# Patient Record
Sex: MALE | Race: WHITE | NOT HISPANIC OR LATINO | Employment: OTHER | URBAN - METROPOLITAN AREA
[De-identification: names, ages, dates, MRNs, and addresses within clinical notes are randomized per-mention and may not be internally consistent; named-entity substitution may affect disease eponyms.]

---

## 2017-01-06 ENCOUNTER — OFFICE VISIT (OUTPATIENT)
Dept: FAMILY MEDICINE | Facility: CLINIC | Age: 52
End: 2017-01-06
Payer: MEDICARE

## 2017-01-06 VITALS
HEART RATE: 66 BPM | HEIGHT: 69 IN | BODY MASS INDEX: 31.47 KG/M2 | RESPIRATION RATE: 18 BRPM | DIASTOLIC BLOOD PRESSURE: 80 MMHG | OXYGEN SATURATION: 97 % | WEIGHT: 212.5 LBS | SYSTOLIC BLOOD PRESSURE: 122 MMHG

## 2017-01-06 DIAGNOSIS — E11.42 TYPE 2 DIABETES MELLITUS WITH DIABETIC POLYNEUROPATHY, WITHOUT LONG-TERM CURRENT USE OF INSULIN: ICD-10-CM

## 2017-01-06 DIAGNOSIS — Z12.11 COLON CANCER SCREENING: ICD-10-CM

## 2017-01-06 DIAGNOSIS — M51.36 DEGENERATION OF LUMBAR INTERVERTEBRAL DISC: Primary | ICD-10-CM

## 2017-01-06 DIAGNOSIS — F11.20 OPIOID DEPENDENCE, UNCOMPLICATED: ICD-10-CM

## 2017-01-06 DIAGNOSIS — F31.9 BIPOLAR I DISORDER, MOST RECENT EPISODE (OR CURRENT) UNSPECIFIED: ICD-10-CM

## 2017-01-06 DIAGNOSIS — Z29.9 PREVENTIVE MEASURE: ICD-10-CM

## 2017-01-06 PROCEDURE — 3079F DIAST BP 80-89 MM HG: CPT | Mod: S$GLB,,,

## 2017-01-06 PROCEDURE — 99499 UNLISTED E&M SERVICE: CPT | Mod: S$GLB,,,

## 2017-01-06 PROCEDURE — 99214 OFFICE O/P EST MOD 30 MIN: CPT | Mod: S$GLB,,,

## 2017-01-06 PROCEDURE — 1159F MED LIST DOCD IN RCRD: CPT | Mod: S$GLB,,,

## 2017-01-06 PROCEDURE — 99999 PR PBB SHADOW E&M-EST. PATIENT-LVL V: CPT | Mod: PBBFAC,,,

## 2017-01-06 PROCEDURE — 3074F SYST BP LT 130 MM HG: CPT | Mod: S$GLB,,,

## 2017-01-06 PROCEDURE — 2022F DILAT RTA XM EVC RTNOPTHY: CPT | Mod: S$GLB,,,

## 2017-01-06 PROCEDURE — 4010F ACE/ARB THERAPY RXD/TAKEN: CPT | Mod: S$GLB,,,

## 2017-01-06 PROCEDURE — 3044F HG A1C LEVEL LT 7.0%: CPT | Mod: S$GLB,,,

## 2017-01-06 RX ORDER — DIVALPROEX SODIUM 500 MG/1
TABLET, DELAYED RELEASE ORAL
COMMUNITY
Start: 2017-01-02 | End: 2017-03-26 | Stop reason: SDUPTHER

## 2017-01-06 RX ORDER — HYDROCODONE BITARTRATE AND ACETAMINOPHEN 10; 325 MG/1; MG/1
1 TABLET ORAL EVERY 6 HOURS PRN
Qty: 120 TABLET | Refills: 0 | Status: SHIPPED | OUTPATIENT
Start: 2017-01-06 | End: 2017-02-05

## 2017-01-06 RX ORDER — HYDROCODONE BITARTRATE AND ACETAMINOPHEN 10; 325 MG/1; MG/1
1 TABLET ORAL EVERY 6 HOURS PRN
Qty: 120 TABLET | Refills: 0 | Status: SHIPPED | OUTPATIENT
Start: 2017-03-06 | End: 2017-03-10

## 2017-01-06 RX ORDER — HYDROCODONE BITARTRATE AND ACETAMINOPHEN 10; 325 MG/1; MG/1
1 TABLET ORAL EVERY 6 HOURS PRN
Qty: 120 TABLET | Refills: 0 | Status: SHIPPED | OUTPATIENT
Start: 2017-02-06 | End: 2017-03-08

## 2017-01-06 NOTE — PROGRESS NOTES
Subjective:       Patient ID: Mike Barcenas Jr. is a 51 y.o. male.    Chief Complaint: 3 month check up    HPI The patient presents for medical management of chronic pain. He has chronic pain in his back. He has been treated with opioid pain medications for years because he failed to improve with more conservative treatments such as OTC medications, Non-opioid pain medications, therapy and other modalities. He is compliant with treatment. I reviewed his profile on the [facility] Prescription Monitoring Program Website and he appears to be compliant. His last refill of hydrocodone-APAP  # 120 was on December 12, 2016. He has severe daily pain. Pain medications allow him to perform activities of daily living that he would otherwise not be able to perform. He denies any ongoing substance abuse. He has signed a pain management agreement and understands the risk, benefits and alternatives of treatment with chronic opioid pain medications including the risk of tolerance and dependency.   According to the recommendations from the [employer] for Disease control it is no longer considered to be safe to take benzodiazepines with opioid pain medications. He is weaning diazepam.     Review of Systems   Constitutional: Negative.    Respiratory: Negative.  Negative for shortness of breath.    Cardiovascular: Negative for chest pain.   Psychiatric/Behavioral: Negative.    All other systems reviewed and are negative.      Objective:      Vitals:    01/06/17 1302   BP: 122/80   Pulse: 66   Resp: 18     Physical Exam   Constitutional: He is oriented to person, place, and time. He appears well-developed and well-nourished. He is cooperative. No distress.   HENT:   Head: Normocephalic and atraumatic.   Right Ear: Hearing, tympanic membrane, external ear and ear canal normal.   Left Ear: Hearing, external ear and ear canal normal.   Nose: Nose normal.   Mouth/Throat: Oropharynx is clear and moist.   Eyes: Conjunctivae are  normal. Pupils are equal, round, and reactive to light.   Neck: Normal range of motion. Neck supple. No thyromegaly present.   Cardiovascular: Normal rate, regular rhythm, normal heart sounds and intact distal pulses.    Pulmonary/Chest: Effort normal and breath sounds normal. No respiratory distress.   Musculoskeletal: Normal range of motion. He exhibits no edema or tenderness.   Lymphadenopathy:     He has no cervical adenopathy.   Neurological: He is alert and oriented to person, place, and time. He has normal strength. Coordination and gait normal.   Skin: Skin is warm, dry and intact. No cyanosis. Nails show no clubbing.   Psychiatric: He has a normal mood and affect. His speech is normal and behavior is normal. Judgment and thought content normal. Cognition and memory are normal.   Vitals reviewed.      Assessment:       1. Degeneration of lumbar intervertebral disc    2. Type 2 diabetes mellitus with diabetic polyneuropathy, without long-term current use of insulin    3. Opioid dependence, uncomplicated    4. Bipolar I disorder, most recent episode (or current) unspecified    5. Colon cancer screening    6. Preventive measure        Plan:       Degeneration of lumbar intervertebral disc  -     hydrocodone-acetaminophen 10-325mg (NORCO)  mg Tab; Take 1 tablet by mouth every 6 (six) hours as needed.  Dispense: 120 tablet; Refill: 0  -     hydrocodone-acetaminophen 10-325mg (NORCO)  mg Tab; Take 1 tablet by mouth every 6 (six) hours as needed.  Dispense: 120 tablet; Refill: 0  -     hydrocodone-acetaminophen 10-325mg (NORCO)  mg Tab; Take 1 tablet by mouth every 6 (six) hours as needed.  Dispense: 120 tablet; Refill: 0    Type 2 diabetes mellitus with diabetic polyneuropathy, without long-term current use of insulin    Opioid dependence, uncomplicated    Bipolar I disorder, most recent episode (or current) unspecified    Colon cancer screening  -     Case request GI: COLONOSCOPY    Preventive  measure  -     Pneumococcal Polysaccharide Vaccine (23 Valent) (SQ/IM)      Return in about 3 months (around 4/6/2017).

## 2017-01-06 NOTE — MR AVS SNAPSHOT
[facility]  [address] [place] [address]  Phone: [phone]  Fax: [phone]                  Mike HETAL Barcenas Jr.   2017 1:00 PM   Office Visit    Description:  Male : 1965   Provider:  Kiet Parker Jr., MD   Department:  [facility]           Reason for Visit     3 month check up           Diagnoses this Visit        Comments    Degeneration of lumbar intervertebral disc    -  Primary     Type 2 diabetes mellitus with diabetic polyneuropathy, without long-term current use of insulin         Opioid dependence, uncomplicated         Bipolar I disorder, most recent episode (or current) unspecified         Colon cancer screening         Preventive measure                To Do List           Goals (5 Years of Data)     None       These Medications        Disp Refills Start End    hydrocodone-acetaminophen 10-325mg (NORCO)  mg Tab 120 tablet 0 2017    Take 1 tablet by mouth every 6 (six) hours as needed. - Oral    Pharmacy: [facility] - [place] - [id number] Ph #: [phone]       hydrocodone-acetaminophen 10-325mg (NORCO)  mg Tab 120 tablet 0 2017 3/8/2017    Take 1 tablet by mouth every 6 (six) hours as needed. - Oral    Pharmacy: [facility] - [place] - [id number] Ph #: [phone]       hydrocodone-acetaminophen 10-325mg (NORCO)  mg Tab 120 tablet 0 3/6/2017 2017    Take 1 tablet by mouth every 6 (six) hours as needed. - Oral    Pharmacy: [facility] - [place] - [address] Ph #: [phone]         [facility]     [facility] On [facility] Nurse Care Line -  Assistance  Registered nurses in the [facility] provide clinical advisement, health education, appointment booking, and other advisory services.  Call for this free service at [phone].             Medications           Message regarding Medications     Verify the changes and/or  additions to your medication regime listed below are the same as discussed with your clinician today.  If any of these changes or additions are incorrect, please notify your healthcare provider.        START taking these NEW medications        Refills    hydrocodone-acetaminophen 10-325mg (NORCO)  mg Tab 0    Sig: Take 1 tablet by mouth every 6 (six) hours as needed.    Class: Print    Route: Oral    hydrocodone-acetaminophen 10-325mg (NORCO)  mg Tab 0    Starting on: 2/6/2017    Sig: Take 1 tablet by mouth every 6 (six) hours as needed.    Class: Print    Route: Oral    hydrocodone-acetaminophen 10-325mg (NORCO)  mg Tab 0    Starting on: 3/6/2017    Sig: Take 1 tablet by mouth every 6 (six) hours as needed.    Class: Print    Route: Oral           Verify that the below list of medications is an accurate representation of the medications you are currently taking.  If none reported, the list may be blank. If incorrect, please contact your healthcare provider. Carry this list with you in case of emergency.           Current Medications     amlodipine (NORVASC) 10 MG tablet Take 1 tablet (10 mg total) by mouth once daily.    ascorbic acid (VITAMIN C) 500 MG tablet Take 500 mg by mouth once daily.    atorvastatin (LIPITOR) 40 MG tablet Take 1 tablet (40 mg total) by mouth once daily.    calcium carbonate-vitamin D3 500mg (1,250mg) -600 unit Tab     diazePAM (VALIUM) 5 MG tablet Take 1 tablet (5 mg total) by mouth 3 (three) times daily as needed.    divalproex (DEPAKOTE) 500 MG Tb24 2 tablets by mouth every morning and 3 tablets by mouth at bedtime    divalproex (DEPAKOTE) 500 MG TbEC     fenofibrate 160 MG Tab Take 1 tablet (160 mg total) by mouth once daily.    fluoxetine (PROZAC) 20 MG capsule Take 1 capsule (20 mg total) by mouth every morning.    folic acid (FOLVITE) 800 MCG Tab     isosorbide mononitrate (IMDUR) 30 MG 24 hr tablet Take 2 tablets (60 mg total) by mouth once daily.    lisinopril 10  "MG tablet TAKE 1/2 TABLET BY MOUTH DAILY FOR BLOOD PRESSURE.    metformin (GLUCOPHAGE) 850 MG tablet Take 1 tablet (850 mg total) by mouth 2 (two) times daily with meals.    methocarbamol (ROBAXIN) 750 MG Tab Take 1 tablet (750 mg total) by mouth once daily.    multivitamin (THERAGRAN) per tablet Take 1 tablet by mouth once daily.    multivitamin with minerals tablet     quetiapine (SEROQUEL) 400 MG tablet Take 1 tablet (400 mg total) by mouth every evening.    VITAMIN B-12 50 mcg Lozg     vitamin E 400 UNIT capsule     hydrocodone-acetaminophen 10-325mg (NORCO)  mg Tab Take 1 tablet by mouth every 6 (six) hours as needed.    hydrocodone-acetaminophen 10-325mg (NORCO)  mg Tab Starting on Feb 06, 2017. Take 1 tablet by mouth every 6 (six) hours as needed.    hydrocodone-acetaminophen 10-325mg (NORCO)  mg Tab Starting on Mar 06, 2017. Take 1 tablet by mouth every 6 (six) hours as needed.           Clinical Reference Information           Vital Signs - Last Recorded  Most recent update: 1/6/2017  1:04 PM by Star Sarah LPN    BP Pulse Resp Ht Wt SpO2    122/80 (BP Location: Right arm, Patient Position: Sitting, BP Method: Manual) 66 18 5' 9" (1.753 m) 96.4 kg (212 lb 8.4 oz) 97%    BMI                31.38 kg/m2          Blood Pressure          Most Recent Value    BP  122/80      Allergies as of 1/6/2017     Ciprofloxacin      Immunizations Administered on Date of Encounter - 1/6/2017     Name Date Dose VIS Date Route    Pneumococcal Polysaccharide - 23 Valent  Incomplete 0.5 mL 4/24/2015 Intramuscular      Orders Placed During Today's Visit      Normal Orders This Visit    Case request GI: COLONOSCOPY     Pneumococcal Polysaccharide Vaccine (23 Valent) (SQ/IM)       Instructions    You are being prescribed opioid pain medications for chronic pain.     Opioid pain medications can cause drug dependence. This means that withdrawal symptoms may occur if you stop using the medicine too quickly. " "    Opioid pain medications can only be prescribed in compliance with chronic opioid pain medication regulations of the [facility] Board of Medical Examiners, the [facility] board and the Federal Drug Enforcement Agency (VIK[employer]).     It is important that you are compliant in taking Opioid pain medications strictly as prescribed. You have signed an agreement and are aware of the risk of physical dependency, tolerance or addiction to Opioid pain medications. You have been informed of the side effects associated with this medication including constipation. You are aware that withdrawal symptoms including yawning, sweating, watery eyes, runny nose, anxiety, tremors, aching muscles, hot and cold flashes, goose bumps, abdominal pain, diarrhea and depression can occur if you suddenly discontinue or reduce the dose you are prescribed.     You have been informed that this medication should not be taken during pregnancy and you will inform me if you do plan to become or become pregnant during treatment.     You have agreed not to obtain this or other controlled medications from other physicians while taking Opioid pain medications. You understand that I will review a history of all controlled medications prescribed to you on the [facility] Prescription Monitoring Program Database and that you must report any other controlled medication use to your physician. If you have an emergency or acute pain crisis you will inform me as soon as possible.     You will come for your regularly scheduled appointments which must be at least every 90 days. You may schedule an appointment early and I can prescribe your medication in advance with a "do not fill until" date. If you have not been evaluated in the past 90 days your prescription will not be renewed. You should schedule an appointment for your return visit before you leave the office today because if I am out of the office for vacation or other reasons there " is no other physician that can prescribe this medication in my absence. If you schedule an appointment and I need to cancel that visit my office will contact you to make alternative arrangements.     You should not call for refills of this medication at night, weekends or Holidays without exception.     You have agreed not to use illegal drugs while taking Opioid pain medications including marijuana. You understand that I can request a random drug test at any time and that you have agreed to provide this sample without advanced notice. Failure to comply with this request could result in termination of your treatment with Opioid pain medications.     You understand that lost, stolen, misplaced, spilled medications will not be replaced. It is your responsibility to safeguard your medication. You understand that your medication cannot be filled early for any reason including work schedule conflicts, family emergencies, travel and vacations. You also have agreed not to give or sell Opioid pain medications to anyone.            Smoking Cessation     If you would like to quit smoking:   You may be eligible for free services if you are a [place] resident and started smoking cigarettes before September 1, 1988.  Call the Smoking Cessation Trust (SCT) toll free at (666) 105-4038 or (476) [phone].   Call 3-048-QUIT-NOW if you do not meet the above criteria.

## 2017-01-06 NOTE — PATIENT INSTRUCTIONS
"You are being prescribed opioid pain medications for chronic pain.     Opioid pain medications can cause drug dependence. This means that withdrawal symptoms may occur if you stop using the medicine too quickly.     Opioid pain medications can only be prescribed in compliance with chronic opioid pain medication regulations of the [facility] Board of Medical Examiners, the [facility] board and the Federal Drug Enforcement Agency (VIK[employer]).     It is important that you are compliant in taking Opioid pain medications strictly as prescribed. You have signed an agreement and are aware of the risk of physical dependency, tolerance or addiction to Opioid pain medications. You have been informed of the side effects associated with this medication including constipation. You are aware that withdrawal symptoms including yawning, sweating, watery eyes, runny nose, anxiety, tremors, aching muscles, hot and cold flashes, goose bumps, abdominal pain, diarrhea and depression can occur if you suddenly discontinue or reduce the dose you are prescribed.     You have been informed that this medication should not be taken during pregnancy and you will inform me if you do plan to become or become pregnant during treatment.     You have agreed not to obtain this or other controlled medications from other physicians while taking Opioid pain medications. You understand that I will review a history of all controlled medications prescribed to you on the [facility] Prescription Monitoring Program Database and that you must report any other controlled medication use to your physician. If you have an emergency or acute pain crisis you will inform me as soon as possible.     You will come for your regularly scheduled appointments which must be at least every 90 days. You may schedule an appointment early and I can prescribe your medication in advance with a "do not fill until" date. If you have not been evaluated in the " past 90 days your prescription will not be renewed. You should schedule an appointment for your return visit before you leave the office today because if I am out of the office for vacation or other reasons there is no other physician that can prescribe this medication in my absence. If you schedule an appointment and I need to cancel that visit my office will contact you to make alternative arrangements.     You should not call for refills of this medication at night, weekends or Holidays without exception.     You have agreed not to use illegal drugs while taking Opioid pain medications including marijuana. You understand that I can request a random drug test at any time and that you have agreed to provide this sample without advanced notice. Failure to comply with this request could result in termination of your treatment with Opioid pain medications.     You understand that lost, stolen, misplaced, spilled medications will not be replaced. It is your responsibility to safeguard your medication. You understand that your medication cannot be filled early for any reason including work schedule conflicts, family emergencies, travel and vacations. You also have agreed not to give or sell Opioid pain medications to anyone.

## 2017-01-13 ENCOUNTER — OFFICE VISIT (OUTPATIENT)
Dept: PSYCHIATRY | Facility: CLINIC | Age: 52
End: 2017-01-13
Payer: MEDICARE

## 2017-01-13 DIAGNOSIS — F31.31 BIPOLAR 1 DISORDER, DEPRESSED, MILD: Primary | ICD-10-CM

## 2017-01-13 PROCEDURE — 90834 PSYTX W PT 45 MINUTES: CPT | Mod: S$GLB,,, | Performed by: SOCIAL WORKER

## 2017-01-13 NOTE — PROGRESS NOTES
Individual Psychotherapy (PhD/LCSW)    1/13/2017    Site:  [facility]         Therapeutic Intervention: Met with patient.  Outpatient  - Insight oriented psychotherapy 45-50  min - [id number]    Chief complaint/reason for encounter: bipolar     Interval history and content of current session:   Salient event he lost his cat of 19 years two days ago.  He was properly tearful as he discussed the events.  He is doing well.  Has multiple projects.  Barely drinks alcohol.  He likes it living at father's house.       Treatment plan:  · Target symptoms: mood stability  · Why chosen therapy is appropriate versus another modality: relevant to diagnosis  · Outcome monitoring methods: self-report, observation  · Therapeutic intervention type: behavior modifying psychotherapy, supportive psychotherapy    Risk parameters:  Patient reports no suicidal ideation  Patient reports no homicidal ideation  Patient reports no self-injurious behavior  Patient reports no violent behavior    Verbal deficits: None    Patient's response to intervention:  The patient's response to intervention is accepting.    Progress toward goals and other mental status changes:  The patient's progress toward goals is fair .    Diagnosis: bipolar disorder    Plan:  individual psychotherapy    Return to clinic: 3 months.  Pt request 3 mo.

## 2017-01-31 RX ORDER — DIAZEPAM 5 MG/1
5 TABLET ORAL 3 TIMES DAILY PRN
Qty: 90 TABLET | Refills: 3 | Status: SHIPPED | OUTPATIENT
Start: 2017-01-31 | End: 2017-05-01 | Stop reason: SDUPTHER

## 2017-02-03 DIAGNOSIS — E11.9 TYPE 2 DIABETES MELLITUS WITHOUT COMPLICATION: ICD-10-CM

## 2017-02-07 ENCOUNTER — TELEPHONE (OUTPATIENT)
Dept: SURGERY | Facility: HOSPITAL | Age: 52
End: 2017-02-07

## 2017-02-07 NOTE — TELEPHONE ENCOUNTER
Mr Barcenas would like Ling to give him a call he has some questions about procedure Thanks Krysta

## 2017-02-20 ENCOUNTER — ANESTHESIA EVENT (OUTPATIENT)
Dept: ENDOSCOPY | Facility: HOSPITAL | Age: 52
End: 2017-02-20
Payer: MEDICARE

## 2017-02-21 ENCOUNTER — HOSPITAL ENCOUNTER (OUTPATIENT)
Facility: HOSPITAL | Age: 52
Discharge: HOME | End: 2017-02-21
Attending: INTERNAL MEDICINE | Admitting: INTERNAL MEDICINE
Payer: MEDICARE

## 2017-02-21 ENCOUNTER — SURGERY (OUTPATIENT)
Age: 52
End: 2017-02-21

## 2017-02-21 ENCOUNTER — ANESTHESIA (OUTPATIENT)
Dept: ENDOSCOPY | Facility: HOSPITAL | Age: 52
End: 2017-02-21
Payer: MEDICARE

## 2017-02-21 VITALS
HEIGHT: 69 IN | WEIGHT: 198 LBS | HEART RATE: 62 BPM | BODY MASS INDEX: 29.33 KG/M2 | DIASTOLIC BLOOD PRESSURE: 78 MMHG | TEMPERATURE: 98 F | OXYGEN SATURATION: 98 % | SYSTOLIC BLOOD PRESSURE: 120 MMHG | RESPIRATION RATE: 18 BRPM

## 2017-02-21 VITALS — RESPIRATION RATE: 22 BRPM

## 2017-02-21 DIAGNOSIS — Z12.11 COLON CANCER SCREENING: ICD-10-CM

## 2017-02-21 LAB — GLUCOSE SERPL-MCNC: 118 MG/DL (ref 70–110)

## 2017-02-21 PROCEDURE — 25000003 PHARM REV CODE 250: Mod: PO | Performed by: NURSE ANESTHETIST, CERTIFIED REGISTERED

## 2017-02-21 PROCEDURE — G0121 COLON CA SCRN NOT HI RSK IND: HCPCS | Mod: PO | Performed by: INTERNAL MEDICINE

## 2017-02-21 PROCEDURE — D9220A PRA ANESTHESIA: Mod: 33,ANES,, | Performed by: ANESTHESIOLOGY

## 2017-02-21 PROCEDURE — 82962 GLUCOSE BLOOD TEST: CPT | Mod: PO | Performed by: INTERNAL MEDICINE

## 2017-02-21 PROCEDURE — 37000009 HC ANESTHESIA EA ADD 15 MINS: Mod: PO | Performed by: INTERNAL MEDICINE

## 2017-02-21 PROCEDURE — 25000003 PHARM REV CODE 250: Mod: PO | Performed by: INTERNAL MEDICINE

## 2017-02-21 PROCEDURE — G0105 COLORECTAL SCRN; HI RISK IND: HCPCS | Mod: ,,, | Performed by: INTERNAL MEDICINE

## 2017-02-21 PROCEDURE — D9220A PRA ANESTHESIA: Mod: 33,CRNA,, | Performed by: NURSE ANESTHETIST, CERTIFIED REGISTERED

## 2017-02-21 PROCEDURE — 37000008 HC ANESTHESIA 1ST 15 MINUTES: Mod: PO | Performed by: INTERNAL MEDICINE

## 2017-02-21 PROCEDURE — 63600175 PHARM REV CODE 636 W HCPCS: Mod: PO | Performed by: NURSE ANESTHETIST, CERTIFIED REGISTERED

## 2017-02-21 RX ORDER — LIDOCAINE HCL/PF 100 MG/5ML
SYRINGE (ML) INTRAVENOUS
Status: DISCONTINUED | OUTPATIENT
Start: 2017-02-21 | End: 2017-02-21

## 2017-02-21 RX ORDER — SODIUM CHLORIDE, SODIUM LACTATE, POTASSIUM CHLORIDE, CALCIUM CHLORIDE 600; 310; 30; 20 MG/100ML; MG/100ML; MG/100ML; MG/100ML
INJECTION, SOLUTION INTRAVENOUS CONTINUOUS
Status: DISCONTINUED | OUTPATIENT
Start: 2017-02-21 | End: 2017-02-21 | Stop reason: HOSPADM

## 2017-02-21 RX ORDER — PROPOFOL 10 MG/ML
VIAL (ML) INTRAVENOUS
Status: DISCONTINUED | OUTPATIENT
Start: 2017-02-21 | End: 2017-02-21

## 2017-02-21 RX ADMIN — PROPOFOL 30 MG: 10 INJECTION, EMULSION INTRAVENOUS at 10:02

## 2017-02-21 RX ADMIN — LIDOCAINE HYDROCHLORIDE 100 MG: 20 INJECTION, SOLUTION INTRAVENOUS at 10:02

## 2017-02-21 RX ADMIN — SODIUM CHLORIDE, SODIUM LACTATE, POTASSIUM CHLORIDE, AND CALCIUM CHLORIDE: .6; .31; .03; .02 INJECTION, SOLUTION INTRAVENOUS at 10:02

## 2017-02-21 NOTE — ANESTHESIA POSTPROCEDURE EVALUATION
"Anesthesia Post Evaluation    Patient: Mike Barcenas Jr.    Procedure(s) Performed: Procedure(s) (LRB):  COLONOSCOPY (N/A)    Final Anesthesia Type: general  Patient location during evaluation: PACU  Patient participation: Yes- Able to Participate  Level of consciousness: awake and alert and oriented  Post-procedure vital signs: reviewed and stable  Pain management: adequate  Airway patency: patent  PONV status at discharge: No PONV  Anesthetic complications: no      Cardiovascular status: hemodynamically stable  Respiratory status: unassisted, spontaneous ventilation and room air  Hydration status: euvolemic  Follow-up not needed.        Visit Vitals    BP (!) 99/55 (BP Location: Left arm, Patient Position: Lying, BP Method: Automatic)    Pulse 64    Temp 36.6 °C (97.8 °F) (Skin)    Resp 18    Ht 5' 9" (1.753 m)    Wt 89.8 kg (198 lb)    SpO2 98%    BMI 29.24 kg/m2       Pain/Jose L Score: Pain Assessment Performed: Yes (2/21/2017 10:59 AM)  Presence of Pain: non-verbal indicators absent (2/21/2017 10:59 AM)  Jose L Score: 6 (2/21/2017 10:59 AM)      "

## 2017-02-21 NOTE — BRIEF OP NOTE
Discharge Note  Short Stay      SUMMARY     Admit Date: 2/21/2017    Attending Physician: All Blankenship Jr., MD     Discharge Physician: All Blankenship Jr., MD    Discharge Date: 2/21/2017 11:13 AM    Final Diagnosis: Colon cancer screening [Z12.11]  Impression:          - Internal hemorrhoids.                       - Mild colonic spasm consistent with irritable bowel                        syndrome.                       - The examination was otherwise normal.                       - The examined portion of the ileum was normal.                       - No specimens collected.  Recommendation:      - Discharge patient to home.                       - High fiber diet.                       - Repeat colonoscopy in 8 years for screening                        purposes.                       - Continue present medications.  Disposition: HOME OR SELF CARE    Patient Instructions:   Current Discharge Medication List      CONTINUE these medications which have NOT CHANGED    Details   amlodipine (NORVASC) 10 MG tablet Take 1 tablet (10 mg total) by mouth once daily.  Qty: 90 tablet, Refills: 3    Associated Diagnoses: Essential hypertension      ascorbic acid (VITAMIN C) 500 MG tablet Take 500 mg by mouth once daily.      atorvastatin (LIPITOR) 40 MG tablet Take 1 tablet (40 mg total) by mouth once daily.  Qty: 90 tablet, Refills: 3      calcium carbonate-vitamin D3 500mg (1,250mg) -600 unit Tab       diazePAM (VALIUM) 5 MG tablet Take 1 tablet (5 mg total) by mouth 3 (three) times daily as needed.  Qty: 90 tablet, Refills: 3      divalproex (DEPAKOTE) 500 MG Tb24 2 tablets by mouth every morning and 3 tablets by mouth at bedtime  Qty: 450 tablet, Refills: 0    Associated Diagnoses: Moderate depressed bipolar I disorder; Anxiety state      fenofibrate 160 MG Tab Take 1 tablet (160 mg total) by mouth once daily.  Qty: 90 tablet, Refills: 3      fluoxetine (PROZAC) 20 MG capsule Take 1 capsule (20 mg total) by mouth  every morning.  Qty: 90 capsule, Refills: 0      folic acid (FOLVITE) 800 MCG Tab       !! hydrocodone-acetaminophen 10-325mg (NORCO)  mg Tab Take 1 tablet by mouth every 6 (six) hours as needed.  Qty: 120 tablet, Refills: 0    Associated Diagnoses: Degeneration of lumbar intervertebral disc      isosorbide mononitrate (IMDUR) 30 MG 24 hr tablet Take 2 tablets (60 mg total) by mouth once daily.  Qty: 180 tablet, Refills: 3      lisinopril 10 MG tablet TAKE 1/2 TABLET BY MOUTH DAILY FOR BLOOD PRESSURE.  Qty: 45 tablet, Refills: 3      methocarbamol (ROBAXIN) 750 MG Tab Take 1 tablet (750 mg total) by mouth once daily.  Qty: 90 tablet, Refills: 3      multivitamin (THERAGRAN) per tablet Take 1 tablet by mouth once daily.      VITAMIN B-12 50 mcg Lozg       divalproex (DEPAKOTE) 500 MG TbEC       !! hydrocodone-acetaminophen 10-325mg (NORCO)  mg Tab Take 1 tablet by mouth every 6 (six) hours as needed.  Qty: 120 tablet, Refills: 0    Associated Diagnoses: Degeneration of lumbar intervertebral disc      metformin (GLUCOPHAGE) 850 MG tablet Take 1 tablet (850 mg total) by mouth 2 (two) times daily with meals.  Qty: 180 tablet, Refills: 3      multivitamin with minerals tablet       quetiapine (SEROQUEL) 400 MG tablet Take 1 tablet (400 mg total) by mouth every evening.  Qty: 90 tablet, Refills: 0      vitamin E 400 UNIT capsule        !! - Potential duplicate medications found. Please discuss with provider.          Discharge Procedure Orders (must include Diet, Follow-up, Activity)    Follow Up:  Follow up with PCP as per your routine.  Please follow a high fiber diet.  Activity as tolerated.    No driving day of procedure.

## 2017-02-21 NOTE — DISCHARGE INSTRUCTIONS
Procedural Sedation (Adult)  You have been given medicine by vein to make you sleep during your surgery. This may have included both a pain medicine and sleeping medicine. Most of the effects have worn off. But you may still have some drowsiness for the next 6 to 8 hours.  Home care  Follow these guidelines when you get home:  · For the next 8 hours, you should be watched by a responsible adult. This person should make sure your condition is not getting worse.  · Don't take any medicine by mouth for pain or for sleep during the next 4 hours. These might react with the medicines you were given in the hospital. This could cause a much stronger response than usual.  · Don't drink any alcohol for the next 24 hours.  · Don't drive, operate dangerous machinery, or make important business or personal decisions during the next 24 hours.  Follow-up care  Follow up with your healthcare provider if you are not alert and back to your usual level of activity within 12 hours.  When to seek medical advice  Call your healthcare provider right away if any of these occur:  · Drowsiness gets worse  · Weakness or dizziness gets worse  · Repeated vomiting  · You cannot be awakened   Date Last Reviewed: 10/18/2016  © 9342-9643 [employer]. [address] [place] [address]. All rights reserved. This information is not intended as a substitute for professional medical care. Always follow your healthcare professional's instructions.        High-Fiber Diet  Fiber is in fruits, vegetables, cereals, and grains. Fiber passes through your body undigested. A high-fiber diet helps food move through your intestinal tract. The added bulk is helpful in preventing constipation. In people with diverticulosis, fiber helps clean out the pouches along the colon wall. It also prevents new pouches from forming. A high-fiber diet reduces the risk of colon cancer. It also lowers blood cholesterol and prevents high blood sugar in  people with diabetes.    The fiber-rich foods listed below should be part of your diet. If you are not used to high-fiber foods, start with 1 or 2 foods from this list. Every 3 to 4 days add a new one to your diet. Do this until you are eating 4 high-fiber foods per day. This should give you 20 to 35 grams of fiber a day. It is also important to drink a lot of water when you are on this diet. You should have 6 to 8 glasses of water a day. Water makes the fiber swell and increases the benefit.  Foods high in dietary fiber  The following foods are high in dietary fiber:  · Breads. Breads made with 100% whole-wheat flour; carolyn, wheat, or rye crackers; whole-grain tortillas, bran muffins.  · Cereals. Whole-grain and bran cereals with bran (shredded wheat, wheat flakes, raisin bran, corn bran); oatmeal, rolled oats, granola, and brown rice.  · Fruits. Fresh fruits and their edible skins (pears, prunes, raisins, berries, apples, and apricots); bananas, citrus fruit, mangoes, pineapple; and prune juice.  · Nuts. Any nuts and seeds.  · Vegetables. Best served raw or lightly cooked. All types, especially: green peas, celery, eggplant, potatoes, spinach, broccoli, Russell sprouts, winter squash, carrots, cauliflower, soybeans, lentils, and fresh and dried beans of all kinds.  · Other. Popcorn, any spices.  Date Last Reviewed: 8/1/2016  © 9439-9863 [employer]. [address] [place] [address], [place] [address]. All rights reserved. This information is not intended as a substitute for professional medical care. Always follow your healthcare professional's instructions.

## 2017-02-21 NOTE — H&P
History & Physical - Short Stay  Gastroenterology      SUBJECTIVE:     Procedure: Colonoscopy    Chief Complaint/Indication for Procedure: Screening    History of Present Illness:  Asymptomatic  Office Visit     1/6/2017  [facility]       Kiet Parker Jr., MD   Family Medicine    Degeneration of lumbar intervertebral disc +5 more   Dx    3 month check up   Reason for visit        Progress Notes   Kiet Parker Jr., MD (Physician)   Family Medicine      Subjective:        Patient ID: Mike Barcenas Jr. is a 51 y.o. male.     Chief Complaint: 3 month check up     HPI The patient presents for medical management of chronic pain. He has chronic pain in his back. He has been treated with opioid pain medications for years because he failed to improve with more conservative treatments such as OTC medications, Non-opioid pain medications, therapy and other modalities. He is compliant with treatment. I reviewed his profile on the [facility] Prescription Monitoring Program Website and he appears to be compliant. His last refill of hydrocodone-APAP  # 120 was on December 12, 2016. He has severe daily pain. Pain medications allow him to perform activities of daily living that he would otherwise not be able to perform. He denies any ongoing substance abuse. He has signed a pain management agreement and understands the risk, benefits and alternatives of treatment with chronic opioid pain medications including the risk of tolerance and dependency.   According to the recommendations from the Center for Disease control it is no longer considered to be safe to take benzodiazepines with opioid pain medications. He is weaning diazepam.      Assessment:       1. Degeneration of lumbar intervertebral disc    2. Type 2 diabetes mellitus with diabetic polyneuropathy, without long-term current use of insulin    3. Opioid dependence, uncomplicated    4. Bipolar I disorder, most recent episode (or  current) unspecified    5. Colon cancer screening    6. Preventive measure        Plan:       Degeneration of lumbar intervertebral disc  - hydrocodone-acetaminophen 10-325mg (NORCO)  mg Tab; Take 1 tablet by mouth every 6 (six) hours as needed. Dispense: 120 tablet; Refill: 0  - hydrocodone-acetaminophen 10-325mg (NORCO)  mg Tab; Take 1 tablet by mouth every 6 (six) hours as needed. Dispense: 120 tablet; Refill: 0  - hydrocodone-acetaminophen 10-325mg (NORCO)  mg Tab; Take 1 tablet by mouth every 6 (six) hours as needed. Dispense: 120 tablet; Refill: 0     Type 2 diabetes mellitus with diabetic polyneuropathy, without long-term current use of insulin     Opioid dependence, uncomplicated     Bipolar I disorder, most recent episode (or current) unspecified     Colon cancer screening  - Case request GI: COLONOSCOPY     Preventive measure  - Pneumococcal Polysaccharide Vaccine (23 Valent) (SQ/IM)        Return in about 3 months (around 4/6/2017).                PTA Medications   Medication Sig    amlodipine (NORVASC) 10 MG tablet Take 1 tablet (10 mg total) by mouth once daily.    ascorbic acid (VITAMIN C) 500 MG tablet Take 500 mg by mouth once daily.    atorvastatin (LIPITOR) 40 MG tablet Take 1 tablet (40 mg total) by mouth once daily.    calcium carbonate-vitamin D3 500mg (1,250mg) -600 unit Tab     diazePAM (VALIUM) 5 MG tablet Take 1 tablet (5 mg total) by mouth 3 (three) times daily as needed.    divalproex (DEPAKOTE) 500 MG Tb24 2 tablets by mouth every morning and 3 tablets by mouth at bedtime    fenofibrate 160 MG Tab Take 1 tablet (160 mg total) by mouth once daily.    fluoxetine (PROZAC) 20 MG capsule Take 1 capsule (20 mg total) by mouth every morning.    folic acid (FOLVITE) 800 MCG Tab     hydrocodone-acetaminophen 10-325mg (NORCO)  mg Tab Take 1 tablet by mouth every 6 (six) hours as needed.    isosorbide mononitrate (IMDUR) 30 MG 24 hr tablet Take 2 tablets (60 mg  total) by mouth once daily.    lisinopril 10 MG tablet TAKE 1/2 TABLET BY MOUTH DAILY FOR BLOOD PRESSURE.    methocarbamol (ROBAXIN) 750 MG Tab Take 1 tablet (750 mg total) by mouth once daily.    multivitamin (THERAGRAN) per tablet Take 1 tablet by mouth once daily.    VITAMIN B-12 50 mcg Lozg     divalproex (DEPAKOTE) 500 MG TbEC     [START ON 3/6/2017] hydrocodone-acetaminophen 10-325mg (NORCO)  mg Tab Take 1 tablet by mouth every 6 (six) hours as needed.    metformin (GLUCOPHAGE) 850 MG tablet Take 1 tablet (850 mg total) by mouth 2 (two) times daily with meals.    multivitamin with minerals tablet     quetiapine (SEROQUEL) 400 MG tablet Take 1 tablet (400 mg total) by mouth every evening.    vitamin E 400 UNIT capsule        Review of patient's allergies indicates:   Allergen Reactions    Ciprofloxacin      Other reaction(s): Unable to obtain        Past Medical History   Diagnosis Date    Bipolar 1 disorder     Brain aneurysm 2015    CAD (coronary artery disease)      Patient denies    Diabetes mellitus     History of psychiatric hospitalization      [facility]    Hyperlipidemia     Hypertension     Lumbar disc disease     Sleep apnea      doesn't use CPAP     Past Surgical History   Procedure Laterality Date    Knee surgery Right     Analplasty      Tonsillectomy      Hemorrhoid surgery       Family History   Problem Relation Age of Onset    Cancer Sister      lymphoma     Bipolar disorder Maternal Uncle     Anxiety disorder Maternal Grandmother     Heart disease Neg Hx      Social History   Substance Use Topics    Smoking status: Current Every Day Smoker     Packs/day: 1.00     Types: Cigarettes    Smokeless tobacco: Never Used    Alcohol use Yes      Comment: 3-4 beers every other week         OBJECTIVE:     Vital Signs (Most Recent)  Temp: 98.1 °F (36.7 °C) (02/21/17 1009)  Pulse: (!) 58 (02/21/17 1009)  Resp: 14 (02/21/17 1009)  BP: (!) 148/78  "(02/21/17 1009)  SpO2: 100 % (02/21/17 1009)    Physical Exam:  : Ht 5' 9" (1.753 m)    Wt 96.4 kg (212 lb 8.4 oz)    BMI 31.38 kg/m2                                                        GENERAL:  Comfortable, in no acute distress.                                 HEENT EXAM:  Nonicteric.  No adenopathy.  Oropharynx is clear.               NECK:  Supple.                                                               LUNGS:  Clear.                                                               CARDIAC:  Regular rate and rhythm.  S1, S2.  No murmur.                      ABDOMEN:  Soft, positive bowel sounds, nontender.  No hepatosplenomegaly or masses.  No rebound or guarding.      EXTREMITIES:  No edema.     MENTAL STATUS:  Alert and oriented.    ASSESSMENT/PLAN:     Assessment: Colorectal cancer screening    Plan: Colonoscopy    Anesthesia Plan:   MAC / General Anaesthesia    ASA Grade: ASA 2 - Patient with mild systemic disease with no functional limitations    MALLAMPATI SCORE: II (hard and soft palate, upper portion of tonsils anduvula visible)    "

## 2017-02-21 NOTE — IP AVS SNAPSHOT
[facility]  [address] [facility]  [place] [address]  Phone: [phone]           Patient Discharge Instructions     Our goal is to set you up for success. This packet includes information on your condition, medications, and your home care. It will help you to care for yourself so you don't get sicker and need to go back to the hospital.     Please ask your nurse if you have any questions.        There are many details to remember when preparing to leave the hospital. Here is what you will need to do:    1. Take your medicine. If you are prescribed medications, review your Medication List in the following pages. You may have new medications to  at the pharmacy and others that you'll need to stop taking. Review the instructions for how and when to take your medications. Talk with your doctor or nurses if you are unsure of what to do.     2. Go to your follow-up appointments. Specific follow-up information is listed in the following pages. Your may be contacted by a transition nurse or clinical provider about future appointments. Be sure we have all of the phone numbers to reach you, if needed. Please contact your provider's office if you are unable to make an appointment.     3. Watch for warning signs. Your doctor or nurse will give you detailed warning signs to watch for and when to call for assistance. These instructions may also include educational information about your condition. If you experience any of warning signs to your health, call your doctor.               [facility]  Unless otherwise directed by your provider, please contact [facility], our nurse care line that is available for 24/7 assistance.     [phone] (toll-free)    Registered nurses in the [facility] provide clinical advisement, health education, appointment booking, and other advisory services.                    ** Verify the list of medication(s) below is accurate and up  to date. Carry this with you in case of emergency. If your medications have changed, please notify your healthcare provider.             Medication List      CONTINUE taking these medications        Additional Info                      amlodipine 10 MG tablet   Commonly known as:  NORVASC   Quantity:  90 tablet   Refills:  3   Dose:  10 mg    Instructions:  Take 1 tablet (10 mg total) by mouth once daily.     Begin Date    AM    Noon    PM    Bedtime       ascorbic acid (vitamin C) 500 MG tablet   Commonly known as:  VITAMIN C   Refills:  0   Dose:  500 mg    Instructions:  Take 500 mg by mouth once daily.     Begin Date    AM    Noon    PM    Bedtime       atorvastatin 40 MG tablet   Commonly known as:  LIPITOR   Quantity:  90 tablet   Refills:  3   Dose:  40 mg    Instructions:  Take 1 tablet (40 mg total) by mouth once daily.     Begin Date    AM    Noon    PM    Bedtime       calcium carbonate-vitamin D3 500mg (1,250mg) -600 unit Tab   Refills:  0      Begin Date    AM    Noon    PM    Bedtime       diazePAM 5 MG tablet   Commonly known as:  VALIUM   Quantity:  90 tablet   Refills:  3   Dose:  5 mg    Instructions:  Take 1 tablet (5 mg total) by mouth 3 (three) times daily as needed.     Begin Date    AM    Noon    PM    Bedtime       * divalproex 500 MG Tb24   Commonly known as:  DEPAKOTE   Quantity:  450 tablet   Refills:  0    Instructions:  2 tablets by mouth every morning and 3 tablets by mouth at bedtime     Begin Date    AM    Noon    PM    Bedtime       * divalproex 500 MG Tbec   Commonly known as:  DEPAKOTE   Refills:  0      Begin Date    AM    Noon    PM    Bedtime       fenofibrate 160 MG Tab   Quantity:  90 tablet   Refills:  3   Dose:  160 mg    Instructions:  Take 1 tablet (160 mg total) by mouth once daily.     Begin Date    AM    Noon    PM    Bedtime       fluoxetine 20 MG capsule   Commonly known as:  PROZAC   Quantity:  90 capsule   Refills:  0   Dose:  20 mg    Instructions:  Take 1 capsule  (20 mg total) by mouth every morning.     Begin Date    AM    Noon    PM    Bedtime       folic acid 800 MCG Tab   Commonly known as:  FOLVITE   Refills:  0      Begin Date    AM    Noon    PM    Bedtime       * hydrocodone-acetaminophen 10-325mg  mg Tab   Commonly known as:  NORCO   Quantity:  120 tablet   Refills:  0   Dose:  1 tablet    Instructions:  Take 1 tablet by mouth every 6 (six) hours as needed.     Begin Date    AM    Noon    PM    Bedtime       * hydrocodone-acetaminophen 10-325mg  mg Tab   Commonly known as:  NORCO   Quantity:  120 tablet   Refills:  0   Dose:  1 tablet    Start Date:  3/6/2017   Instructions:  Take 1 tablet by mouth every 6 (six) hours as needed.     Begin Date    AM    Noon    PM    Bedtime       isosorbide mononitrate 30 MG 24 hr tablet   Commonly known as:  IMDUR   Quantity:  180 tablet   Refills:  3   Dose:  60 mg    Instructions:  Take 2 tablets (60 mg total) by mouth once daily.     Begin Date    AM    Noon    PM    Bedtime       lisinopril 10 MG tablet   Quantity:  45 tablet   Refills:  3    Instructions:  TAKE 1/2 TABLET BY MOUTH DAILY FOR BLOOD PRESSURE.     Begin Date    AM    Noon    PM    Bedtime       metformin 850 MG tablet   Commonly known as:  GLUCOPHAGE   Quantity:  180 tablet   Refills:  3   Dose:  850 mg    Instructions:  Take 1 tablet (850 mg total) by mouth 2 (two) times daily with meals.     Begin Date    AM    Noon    PM    Bedtime       methocarbamol 750 MG Tab   Commonly known as:  ROBAXIN   Quantity:  90 tablet   Refills:  3   Dose:  750 mg    Instructions:  Take 1 tablet (750 mg total) by mouth once daily.     Begin Date    AM    Noon    PM    Bedtime       multivitamin per tablet   Commonly known as:  THERAGRAN   Refills:  0   Dose:  1 tablet    Instructions:  Take 1 tablet by mouth once daily.     Begin Date    AM    Noon    PM    Bedtime       multivitamin with minerals tablet   Refills:  0      Begin Date    AM    Noon    PM    Bedtime        quetiapine 400 MG tablet   Commonly known as:  SEROQUEL   Quantity:  90 tablet   Refills:  0   Dose:  400 mg    Instructions:  Take 1 tablet (400 mg total) by mouth every evening.     Begin Date    AM    Noon    PM    Bedtime       VITAMIN B-12 50 mcg Lozg   Refills:  0   Generic drug:  cyanocobalamin (vitamin B-12)      Begin Date    AM    Noon    PM    Bedtime       vitamin E 400 UNIT capsule   Refills:  0      Begin Date    AM    Noon    PM    Bedtime       * Notice:  This list has 4 medication(s) that are the same as other medications prescribed for you. Read the directions carefully, and ask your doctor or other care provider to review them with you.               Please bring to all follow up appointments:    1. A copy of your discharge instructions.  2. All medicines you are currently taking in their original bottles.  3. Identification and insurance card.    Please arrive 15 minutes ahead of scheduled appointment time.    Please call 24 hours in advance if you must reschedule your appointment and/or time.        Your Scheduled Appointments     Mar 10, 2017  1:00 PM CST   Annual Checkup/Physical with MD Fior Marie Jr. [facility]    [address] [place] [address] [place] [address] [phone]                Discharge Instructions     Future Orders    Activity as tolerated     Comments:    No driving for 24 hours.        Discharge Instructions         Procedural Sedation (Adult)  You have been given medicine by vein to make you sleep during your surgery. This may have included both a pain medicine and sleeping medicine. Most of the effects have worn off. But you may still have some drowsiness for the next 6 to 8 hours.  Home care  Follow these guidelines when you get home:  · For the next 8 hours, you should be watched by a responsible adult. This person should make sure your condition is not getting worse.  · Don't take any medicine by mouth for pain or for sleep during the next  4 hours. These might react with the medicines you were given in the hospital. This could cause a much stronger response than usual.  · Don't drink any alcohol for the next 24 hours.  · Don't drive, operate dangerous machinery, or make important business or personal decisions during the next 24 hours.  Follow-up care  Follow up with your healthcare provider if you are not alert and back to your usual level of activity within 12 hours.  When to seek medical advice  Call your healthcare provider right away if any of these occur:  · Drowsiness gets worse  · Weakness or dizziness gets worse  · Repeated vomiting  · You cannot be awakened   Date Last Reviewed: 10/18/2016  © 1463-6128 [employer]. [address] [place] [address], [place] [address]. All rights reserved. This information is not intended as a substitute for professional medical care. Always follow your healthcare professional's instructions.        High-Fiber Diet  Fiber is in fruits, vegetables, cereals, and grains. Fiber passes through your body undigested. A high-fiber diet helps food move through your intestinal tract. The added bulk is helpful in preventing constipation. In people with diverticulosis, fiber helps clean out the pouches along the colon wall. It also prevents new pouches from forming. A high-fiber diet reduces the risk of colon cancer. It also lowers blood cholesterol and prevents high blood sugar in people with diabetes.    The fiber-rich foods listed below should be part of your diet. If you are not used to high-fiber foods, start with 1 or 2 foods from this list. Every 3 to 4 days add a new one to your diet. Do this until you are eating 4 high-fiber foods per day. This should give you 20 to 35 grams of fiber a day. It is also important to drink a lot of water when you are on this diet. You should have 6 to 8 glasses of water a day. Water makes the fiber swell and increases the benefit.  Foods high in dietary fiber  The following  "foods are high in dietary fiber:  · Breads. Breads made with 100% whole-wheat flour; carolyn, wheat, or rye crackers; whole-grain tortillas, bran muffins.  · Cereals. Whole-grain and bran cereals with bran (shredded wheat, wheat flakes, raisin bran, corn bran); oatmeal, rolled oats, granola, and brown rice.  · Fruits. Fresh fruits and their edible skins (pears, prunes, raisins, berries, apples, and apricots); bananas, citrus fruit, mangoes, pineapple; and prune juice.  · Nuts. Any nuts and seeds.  · Vegetables. Best served raw or lightly cooked. All types, especially: green peas, celery, eggplant, potatoes, spinach, broccoli, [place] sprouts, winter squash, carrots, cauliflower, soybeans, lentils, and fresh and dried beans of all kinds.  · Other. Popcorn, any spices.  Date Last Reviewed: 8/1/2016  © 6373-8062 [employer]. [address] [place] [address]. All rights reserved. This information is not intended as a substitute for professional medical care. Always follow your healthcare professional's instructions.            Primary Diagnosis     Your primary diagnosis was:  Screen For Colon Cancer      Admission Information     Date & Time Provider Department CSN    2/21/2017  8:54 AM All Blankenship Jr., MD [facility] [id number]      Care Providers     Provider Role Specialty Primary office phone    All Blankenship Jr., MD Attending Provider Gastroenterology [phone]    All Blankenship Jr., MD Surgeon  Gastroenterology [phone]      Your Vitals Were     BP Pulse Temp Resp Height Weight    99/55 (BP Location: Left arm, Patient Position: Lying, BP Method: Automatic) 64 97.8 °F (36.6 °C) (Skin) 18 5' 9" (1.753 m) 89.8 kg (198 lb)    SpO2 BMI             98% 29.24 kg/m2         Recent Lab Values        5/28/2014 7/18/2016                        5:18 AM 11:44 AM          A1C 6.0 5.2          Comment for A1C at 11:44 AM on 7/18/2016:  According to ADA guidelines, " hemoglobin A1C <7.0% represents  optimal control in non-pregnant diabetic patients.  Different  metrics may apply to specific populations.   Standards of Medical Care in Diabetes - 2016.  For the purpose of screening for the presence of diabetes:  <5.7%     Consistent with the absence of diabetes  5.7-6.4%  Consistent with increasing risk for diabetes   (prediabetes)  >or=6.5%  Consistent with diabetes  Currently no consensus exists for use of hemoglobin A1C  for diagnosis of diabetes for children.        Allergies as of 2/21/2017        Reactions    Ciprofloxacin     Unknown, happened when a child      Advance Directives     An advance directive is a document which, in the event you are no longer able to make decisions for yourself, tells your healthcare team what kind of treatment you do or do not want to receive, or who you would like to make those decisions for you.  If you do not currently have an advance directive, [facility] encourages you to create one.  For more information call:  [phone] ([phone]), [phone] ([phone]),  or log on to [email or web address].        Smoking Cessation     If you would like to quit smoking:   You may be eligible for free services if you are a [place] resident and started smoking cigarettes before September 1, 1988.  Call the Smoking Cessation Trust (SCT) toll free at (288) 921-4364 or (441) 602-9232.   Call 2-948-QUIT-NOW if you do not meet the above criteria.            Language Assistance Services     ATTENTION: Language assistance services are available, free of charge. Please call [phone].      ATENCIÓN: Si habla español, tiene a roque disposición servicios gratuitos de asistencia lingüística. Llame al [phone].     [facility] Ý: N?u b?n nói Ti?ng Vi?t, có các d?ch v? h? tr? ngôn ng? mi?n phí dành cho b?n. G?i s? [phone].         [facility] complies with applicable Federal civil rights laws and does not discriminate on the  basis of race, color, national origin, age, disability, or sex.

## 2017-02-21 NOTE — TRANSFER OF CARE
"Anesthesia Transfer of Care Note    Patient: Mike Barcenas Jr.    Procedure(s) Performed: Procedure(s) (LRB):  COLONOSCOPY (N/A)    Patient location: PACU    Anesthesia Type: general    Transport from OR: Transported from OR on room air with adequate spontaneous ventilation    Post pain: adequate analgesia    Post assessment: no apparent anesthetic complications and tolerated procedure well    Post vital signs: stable    Level of consciousness: awake and alert    Nausea/Vomiting: no nausea/vomiting    Complications: none          Last vitals:   Visit Vitals    BP (!) 148/78 (BP Location: Right arm, Patient Position: Lying, BP Method: Automatic)    Pulse (!) 58    Temp 36.7 °C (98.1 °F) (Oral)    Resp 14    Ht 5' 9" (1.753 m)    Wt 89.8 kg (198 lb)    SpO2 100%    BMI 29.24 kg/m2     "

## 2017-02-21 NOTE — ANESTHESIA PREPROCEDURE EVALUATION
02/21/2017  Mike Barcenas Jr. is a 51 y.o., male.    [facility] Anesthesia Evaluation    I have reviewed the Patient Summary Reports.    I have reviewed the Nursing Notes.   I have reviewed the Medications.     Review of Systems  Anesthesia Hx:  No problems with previous Anesthesia    Social:  Smoker, Alcohol Use    Cardiovascular:   Hypertension CAD   Denies CABG/stent.  hyperlipidemia    Pulmonary:   Sleep Apnea    Musculoskeletal:   Arthritis   Spine Disorders: lumbar Degenerative disease    Neurological:   Chronic Pain Syndrome  Peripheral Neuropathy    Endocrine:   Diabetes    Psych:   anxiety depression Bipolar d/o         Physical Exam  General:  Obesity    Airway/Jaw/Neck:  Airway Findings: Mouth Opening: Normal Mallampati: II       Chest/Lungs:  Chest/Lungs Findings: Clear to auscultation, Normal Respiratory Rate     Heart/Vascular:  Heart Findings: Rate: Normal  Rhythm: Regular Rhythm  Sounds: Normal        Mental Status:  Mental Status Findings:  Cooperative, Alert and Oriented         Anesthesia Plan  Type of Anesthesia, risks & benefits discussed:  Anesthesia Type:  general  Patient's Preference:   Intra-op Monitoring Plan:   Intra-op Monitoring Plan Comments:   Post Op Pain Control Plan:   Post Op Pain Control Plan Comments:   Induction:   IV  Beta Blocker:  Patient is not currently on a Beta-Blocker (No further documentation required).       Informed Consent: Patient understands risks and agrees with Anesthesia plan.  Questions answered. Anesthesia consent signed with patient.  ASA Score: 3     Day of Surgery Review of History & Physical: I have interviewed and examined the patient. I have reviewed the patient's H&P dated:  There are no significant changes.          Ready For Surgery From Anesthesia Perspective.

## 2017-02-22 ENCOUNTER — PATIENT MESSAGE (OUTPATIENT)
Dept: FAMILY MEDICINE | Facility: CLINIC | Age: 52
End: 2017-02-22

## 2017-03-10 ENCOUNTER — OFFICE VISIT (OUTPATIENT)
Dept: FAMILY MEDICINE | Facility: CLINIC | Age: 52
End: 2017-03-10
Payer: MEDICARE

## 2017-03-10 VITALS
SYSTOLIC BLOOD PRESSURE: 120 MMHG | HEART RATE: 70 BPM | WEIGHT: 205.5 LBS | DIASTOLIC BLOOD PRESSURE: 60 MMHG | OXYGEN SATURATION: 98 % | BODY MASS INDEX: 30.44 KG/M2 | HEIGHT: 69 IN

## 2017-03-10 DIAGNOSIS — I10 ESSENTIAL HYPERTENSION: ICD-10-CM

## 2017-03-10 DIAGNOSIS — E11.42 TYPE 2 DIABETES MELLITUS WITH DIABETIC POLYNEUROPATHY, WITHOUT LONG-TERM CURRENT USE OF INSULIN: ICD-10-CM

## 2017-03-10 DIAGNOSIS — M51.36 DEGENERATION OF LUMBAR INTERVERTEBRAL DISC: Primary | ICD-10-CM

## 2017-03-10 PROCEDURE — 99499 UNLISTED E&M SERVICE: CPT | Mod: S$GLB,,,

## 2017-03-10 PROCEDURE — 99214 OFFICE O/P EST MOD 30 MIN: CPT | Mod: S$GLB,,,

## 2017-03-10 PROCEDURE — 3044F HG A1C LEVEL LT 7.0%: CPT | Mod: S$GLB,,,

## 2017-03-10 PROCEDURE — 3074F SYST BP LT 130 MM HG: CPT | Mod: S$GLB,,,

## 2017-03-10 PROCEDURE — 4010F ACE/ARB THERAPY RXD/TAKEN: CPT | Mod: S$GLB,,,

## 2017-03-10 PROCEDURE — 1160F RVW MEDS BY RX/DR IN RCRD: CPT | Mod: S$GLB,,,

## 2017-03-10 PROCEDURE — 99999 PR PBB SHADOW E&M-EST. PATIENT-LVL III: CPT | Mod: PBBFAC,,,

## 2017-03-10 PROCEDURE — 3078F DIAST BP <80 MM HG: CPT | Mod: S$GLB,,,

## 2017-03-10 PROCEDURE — 2022F DILAT RTA XM EVC RTNOPTHY: CPT | Mod: S$GLB,,,

## 2017-03-10 RX ORDER — HYDROCODONE BITARTRATE AND ACETAMINOPHEN 10; 325 MG/1; MG/1
1 TABLET ORAL EVERY 6 HOURS PRN
Qty: 120 TABLET | Refills: 0 | Status: SHIPPED | OUTPATIENT
Start: 2017-05-10 | End: 2017-06-08

## 2017-03-10 RX ORDER — HYDROCODONE BITARTRATE AND ACETAMINOPHEN 10; 325 MG/1; MG/1
1 TABLET ORAL EVERY 6 HOURS PRN
Qty: 120 TABLET | Refills: 0 | Status: SHIPPED | OUTPATIENT
Start: 2017-04-10 | End: 2017-05-10

## 2017-03-10 RX ORDER — HYDROCODONE BITARTRATE AND ACETAMINOPHEN 10; 325 MG/1; MG/1
1 TABLET ORAL EVERY 6 HOURS PRN
Qty: 120 TABLET | Refills: 0 | Status: SHIPPED | OUTPATIENT
Start: 2017-03-10 | End: 2017-04-09

## 2017-03-10 NOTE — PATIENT INSTRUCTIONS
"You are being prescribed opioid pain medications for chronic pain.     Opioid pain medications can cause drug dependence. This means that withdrawal symptoms may occur if you stop using the medicine too quickly.     Opioid pain medications can only be prescribed in compliance with chronic opioid pain medication regulations of the [facility] Board of Medical Examiners, the [facility] board and the Federal Drug Enforcement Agency (VIKAS).     It is important that you are compliant in taking Opioid pain medications strictly as prescribed. You have signed an agreement and are aware of the risk of physical dependency, tolerance or addiction to Opioid pain medications. You have been informed of the side effects associated with this medication including constipation. You are aware that withdrawal symptoms including yawning, sweating, watery eyes, runny nose, anxiety, tremors, aching muscles, hot and cold flashes, goose bumps, abdominal pain, diarrhea and depression can occur if you suddenly discontinue or reduce the dose you are prescribed.     You have been informed that this medication should not be taken during pregnancy and you will inform me if you do plan to become or become pregnant during treatment.     You have agreed not to obtain this or other controlled medications from other physicians while taking Opioid pain medications. You understand that I will review a history of all controlled medications prescribed to you on the [facility] Prescription Monitoring Program Database and that you must report any other controlled medication use to your physician. If you have an emergency or acute pain crisis you will inform me as soon as possible.     You will come for your regularly scheduled appointments which must be at least every 90 days. You may schedule an appointment early and I can prescribe your medication in advance with a "do not fill until" date. If you have not been evaluated in the " past 90 days your prescription will not be renewed. You should schedule an appointment for your return visit before you leave the office today because if I am out of the office for vacation or other reasons there is no other physician that can prescribe this medication in my absence. If you schedule an appointment and I need to cancel that visit my office will contact you to make alternative arrangements.     You should not call for refills of this medication at night, weekends or Holidays without exception.     You have agreed not to use illegal drugs while taking Opioid pain medications including marijuana. You understand that I can request a random drug test at any time and that you have agreed to provide this sample without advanced notice. Failure to comply with this request could result in termination of your treatment with Opioid pain medications.     You understand that lost, stolen, misplaced, spilled medications will not be replaced. It is your responsibility to safeguard your medication. You understand that your medication cannot be filled early for any reason including work schedule conflicts, family emergencies, travel and vacations. You also have agreed not to give or sell Opioid pain medications to anyone.

## 2017-03-10 NOTE — PROGRESS NOTES
Subjective:       Patient ID: Mike Barcenas Jr. is a 51 y.o. male.    Chief Complaint: Back Pain    HPI The patient presents for medical management of chronic pain. He has chronic pain in his back. He has been treated with opioid pain medications for years because he failed to improve with more conservative treatments such as OTC medications, Non-opioid pain medications, therapy and other modalities. He is compliant with treatment. I reviewed his profile on the [facility] Prescription Monitoring Program Website and he appears to be compliant. His last refill of hydrocodone-APAP  # 120 was on February 13, 2017. He has severe daily pain. Pain medications allow him to perform activities of daily living that he would otherwise not be able to perform. He denies any ongoing substance abuse. He has signed a pain management agreement and understands the risk, benefits and alternatives of treatment with chronic opioid pain medications including the risk of tolerance and dependency. According to the recommendations from the Center for Disease control it is no longer considered to be safe to take benzodiazepines with opioid pain medications. He is being prescribed diazepam 5 mg tid under the supervision of his psychiatrist. He is reducing diazepam.       Review of Systems   Constitutional: Negative.    Respiratory: Negative.  Negative for shortness of breath.    Cardiovascular: Negative for chest pain.   Psychiatric/Behavioral: Negative.    All other systems reviewed and are negative.      Objective:      Vitals:    03/10/17 1302   BP: 120/60   Pulse: 70     Physical Exam   Constitutional: He is oriented to person, place, and time. He appears well-developed and well-nourished. He is cooperative. No distress.   HENT:   Head: Normocephalic and atraumatic.   Right Ear: Hearing, tympanic membrane, external ear and ear canal normal.   Left Ear: Hearing, external ear and ear canal normal.   Nose: Nose normal.    Mouth/Throat: Oropharynx is clear and moist.   Eyes: Conjunctivae are normal. Pupils are equal, round, and reactive to light.   Neck: Normal range of motion. Neck supple. No thyromegaly present.   Cardiovascular: Normal rate, regular rhythm, normal heart sounds and intact distal pulses.    Pulmonary/Chest: Effort normal and breath sounds normal. No respiratory distress.   Musculoskeletal: Normal range of motion. He exhibits no edema or tenderness.   Lymphadenopathy:     He has no cervical adenopathy.   Neurological: He is alert and oriented to person, place, and time. He has normal strength. Coordination and gait normal.   Skin: Skin is warm, dry and intact. No cyanosis. Nails show no clubbing.   Psychiatric: He has a normal mood and affect. His speech is normal and behavior is normal. Judgment and thought content normal. Cognition and memory are normal.   Vitals reviewed.      Assessment:       1. Degeneration of lumbar intervertebral disc    2. Type 2 diabetes mellitus with diabetic polyneuropathy, without long-term current use of insulin    3. Essential hypertension        Plan:       Degeneration of lumbar intervertebral disc  -     hydrocodone-acetaminophen 10-325mg (NORCO)  mg Tab; Take 1 tablet by mouth every 6 (six) hours as needed.  Dispense: 120 tablet; Refill: 0  -     hydrocodone-acetaminophen 10-325mg (NORCO)  mg Tab; Take 1 tablet by mouth every 6 (six) hours as needed.  Dispense: 120 tablet; Refill: 0  -     hydrocodone-acetaminophen 10-325mg (NORCO)  mg Tab; Take 1 tablet by mouth every 6 (six) hours as needed.  Dispense: 120 tablet; Refill: 0    Type 2 diabetes mellitus with diabetic polyneuropathy, without long-term current use of insulin  -     Hemoglobin A1c; Future; Expected date: 3/10/17    Essential hypertension      Return in about 3 months (around 6/10/2017).

## 2017-03-15 RX ORDER — FLUOXETINE HYDROCHLORIDE 20 MG/1
CAPSULE ORAL
Qty: 90 CAPSULE | Refills: 0 | Status: SHIPPED | OUTPATIENT
Start: 2017-03-15 | End: 2017-05-08 | Stop reason: SDUPTHER

## 2017-03-27 RX ORDER — METHOCARBAMOL 750 MG/1
TABLET, FILM COATED ORAL
Qty: 90 TABLET | Refills: 3 | Status: SHIPPED | OUTPATIENT
Start: 2017-03-27 | End: 2018-05-31

## 2017-03-27 RX ORDER — QUETIAPINE FUMARATE 400 MG/1
TABLET, FILM COATED ORAL
Qty: 90 TABLET | Refills: 0 | Status: SHIPPED | OUTPATIENT
Start: 2017-03-27 | End: 2017-05-08 | Stop reason: SDUPTHER

## 2017-03-27 RX ORDER — DIVALPROEX SODIUM 500 MG/1
TABLET, DELAYED RELEASE ORAL
Qty: 450 TABLET | Refills: 0 | Status: SHIPPED | OUTPATIENT
Start: 2017-03-27 | End: 2017-05-08 | Stop reason: SDUPTHER

## 2017-05-01 RX ORDER — DIAZEPAM 5 MG/1
TABLET ORAL
Qty: 90 TABLET | Refills: 0 | Status: SHIPPED | OUTPATIENT
Start: 2017-05-01 | End: 2017-05-08 | Stop reason: SDUPTHER

## 2017-05-08 ENCOUNTER — OFFICE VISIT (OUTPATIENT)
Dept: PSYCHIATRY | Facility: CLINIC | Age: 52
End: 2017-05-08
Payer: MEDICARE

## 2017-05-08 VITALS
DIASTOLIC BLOOD PRESSURE: 66 MMHG | BODY MASS INDEX: 29.92 KG/M2 | HEIGHT: 69 IN | SYSTOLIC BLOOD PRESSURE: 144 MMHG | HEART RATE: 77 BPM | WEIGHT: 202 LBS

## 2017-05-08 DIAGNOSIS — F41.1 ANXIETY STATE: ICD-10-CM

## 2017-05-08 DIAGNOSIS — F31.31 BIPOLAR 1 DISORDER, DEPRESSED, MILD: Primary | ICD-10-CM

## 2017-05-08 DIAGNOSIS — Z79.899 ENCOUNTER FOR LONG-TERM (CURRENT) USE OF MEDICATIONS: ICD-10-CM

## 2017-05-08 PROCEDURE — 3078F DIAST BP <80 MM HG: CPT | Mod: S$GLB,,, | Performed by: PSYCHIATRY & NEUROLOGY

## 2017-05-08 PROCEDURE — 90833 PSYTX W PT W E/M 30 MIN: CPT | Mod: S$GLB,,, | Performed by: PSYCHIATRY & NEUROLOGY

## 2017-05-08 PROCEDURE — 99999 PR PBB SHADOW E&M-EST. PATIENT-LVL II: CPT | Mod: PBBFAC,,, | Performed by: PSYCHIATRY & NEUROLOGY

## 2017-05-08 PROCEDURE — 99499 UNLISTED E&M SERVICE: CPT | Mod: S$GLB,,, | Performed by: PSYCHIATRY & NEUROLOGY

## 2017-05-08 PROCEDURE — 99214 OFFICE O/P EST MOD 30 MIN: CPT | Mod: S$GLB,,, | Performed by: PSYCHIATRY & NEUROLOGY

## 2017-05-08 PROCEDURE — 3077F SYST BP >= 140 MM HG: CPT | Mod: S$GLB,,, | Performed by: PSYCHIATRY & NEUROLOGY

## 2017-05-08 RX ORDER — FLUOXETINE HYDROCHLORIDE 20 MG/1
20 CAPSULE ORAL EVERY MORNING
Qty: 90 CAPSULE | Refills: 0 | Status: SHIPPED | OUTPATIENT
Start: 2017-05-08 | End: 2017-08-28 | Stop reason: SDUPTHER

## 2017-05-08 RX ORDER — QUETIAPINE FUMARATE 400 MG/1
400 TABLET, FILM COATED ORAL NIGHTLY
Qty: 90 TABLET | Refills: 0 | Status: SHIPPED | OUTPATIENT
Start: 2017-05-08 | End: 2017-08-28 | Stop reason: SDUPTHER

## 2017-05-08 RX ORDER — DIVALPROEX SODIUM 500 MG/1
TABLET, DELAYED RELEASE ORAL
Qty: 450 TABLET | Refills: 0 | Status: SHIPPED | OUTPATIENT
Start: 2017-05-08 | End: 2017-08-28 | Stop reason: SDUPTHER

## 2017-05-08 RX ORDER — DIAZEPAM 5 MG/1
5 TABLET ORAL 2 TIMES DAILY PRN
Qty: 60 TABLET | Refills: 3 | Status: SHIPPED | OUTPATIENT
Start: 2017-05-08 | End: 2017-08-28 | Stop reason: SDUPTHER

## 2017-05-08 NOTE — PROGRESS NOTES
"Outpatient Psychiatry Follow-Up Visit (MD/NP)    5/8/2017    Clinical Status of Patient:  Outpatient (Ambulatory)    Chief Complaint:  Mike Barcenas Jr. is a 51 y.o. male who presents today for follow-up of bipolar disorder and anxiety  Met with patient.      Interval History and Content of Current Session:  Interim Events/Subjective Report/Content of Current Session:  51 y.o. male previously treated by Dr. Jj.  Years of treatment for depression and comorbid substance issues with a lot of irritability and behavioral problems.  Switched to bipolar regime in 2009 and did 2 weeks at [facility].  Since then much more stable.  No behavioral issues or substance issues.      Pt reports he has been well.  He reports his mood has been "pretty good".  He requests to decrease the dose of valium.  He reports he is now living in a relatively stressless situation.  He sleep and eats well.  He denies crying spells.  He denies problems with anger.      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      He drinks on special occasions, up to 7 beers.  Last drank for [employer].      Reviewed labs by Dr. Parker.      Psychotherapy:    · Target symptoms: anxiety , mood swings  · Why chosen therapy is appropriate versus another modality: relevant to diagnosis  · Outcome monitoring methods: self-report, observation  · Therapeutic intervention type: supportive psychotherapy  · Topics discussed/themes: dedication to personal growth, building skills sets for symptom management, symptom recognition, financial stressors, life stage transitional issues,   · The patient's response to the intervention is accepting. The patient's progress toward treatment goals is good.   · Duration of intervention: 18 minutes    Review of Systems   · PSYCHIATRIC: Pertinant items are noted in the narrative.    Past Medical, Family and Social History: The patient's past medical, family and social history have been reviewed and updated as " "appropriate within the electronic medical record - see encounter notes.    seroquel 400mg qhs   Valium 10mg tid   Prozac 20mg daily  depakote 1000mg qam and 1500mg qhs       Wt Readings from Last 3 Encounters:   05/08/17 91.6 kg (202 lb)   03/10/17 93.2 kg (205 lb 7.5 oz)   02/21/17 89.8 kg (198 lb)     Lab Results   Component Value Date    VALPROATE 101.0 (H) 07/18/2016     Lab Results   Component Value Date     (L) 07/18/2016     Lab Results   Component Value Date    ALT 20 07/18/2016    AST 28 07/18/2016    ALKPHOS 59 07/18/2016    BILITOT 0.4 07/18/2016     Compliance: yes  Side effects: None     Risk Parameters:  Patient reports no suicidal ideation  Patient reports no homicidal ideation  Patient reports no self-injurious behavior  Patient reports no violent behavior    Exam (detailed: at least 9 elements; comprehensive: all 15 elements)   Constitutional  Vitals:  Most recent vital signs, dated less than 90 days prior to this appointment, were reviewed.    Vitals:    05/08/17 1057   BP: (!) 144/66   Pulse: 77   Weight: 91.6 kg (202 lb)   Height: 5' 9" (1.753 m)        General:  age appropriate, casually dressed, neatly groomed     Musculoskeletal  Muscle Strength/Tone:  no dyskinesia, no tremor   Gait & Station:  non-ataxic   AIMS completed  Psychiatric  Speech:  not pressured or loud but clearly audible   Mood:  Affect:  euthymic  appropriate, bright, midline   Thought Process:  goal-directed, logical   Associations:  intact   Thought Content:  normal, no suicidality, no homicidality, delusions, or paranoia   Insight:  has awareness of illness   Judgement: behavior is adequate to circumstances   Orientation:  grossly intact   Memory: intact for content of interview   Language: grossly intact   Attention Span & Concentration:  able to focus   Fund of Knowledge:  intact and appropriate to age and level of education     Assessment and Diagnosis   Status/Progress: Based on the examination today, the " patient's problem(s) is/are adequately but not ideally controlled.  New problems have not been presented today .   Comorbidities are not complicating management of the primary condition.  There are no active rule-out diagnoses for this patient at this time.    General Impression:  51 y.o. with bipolar disorder and anxiety currently reasonably well controlled on a regimen that includes daily benzodiazepine use.             Diagnosis:  Bipolar I disorder MRE depressed mild  Anxiety D/O NOS      Intervention/Counseling/Treatment Plan   · Continue seroquel 400mg, fluoxetine 20mg, depakote 1000am and 1500pm  · Reduce diazepam to 5mg bid with plan to continue to taper.  Pt was given instructions to cut tabs in half and not to decrease to more than 7.5mg daily by next appt in August  · Valproic acid level  · Continue psychotherapy with Priyanka Rosariojayesh      Return to Clinic: as scheduled

## 2017-06-05 ENCOUNTER — LAB VISIT (OUTPATIENT)
Dept: LAB | Facility: HOSPITAL | Age: 52
End: 2017-06-05
Attending: PSYCHIATRY & NEUROLOGY
Payer: MEDICARE

## 2017-06-05 DIAGNOSIS — Z79.899 ENCOUNTER FOR LONG-TERM (CURRENT) USE OF MEDICATIONS: ICD-10-CM

## 2017-06-05 LAB — VALPROATE SERPL-MCNC: 96.9 UG/ML

## 2017-06-05 PROCEDURE — 36415 COLL VENOUS BLD VENIPUNCTURE: CPT | Mod: PO

## 2017-06-05 PROCEDURE — 80164 ASSAY DIPROPYLACETIC ACD TOT: CPT

## 2017-06-08 ENCOUNTER — OFFICE VISIT (OUTPATIENT)
Dept: FAMILY MEDICINE | Facility: CLINIC | Age: 52
End: 2017-06-08
Payer: MEDICARE

## 2017-06-08 VITALS
SYSTOLIC BLOOD PRESSURE: 120 MMHG | OXYGEN SATURATION: 97 % | HEART RATE: 73 BPM | DIASTOLIC BLOOD PRESSURE: 60 MMHG | HEIGHT: 69 IN | WEIGHT: 197.06 LBS | BODY MASS INDEX: 29.19 KG/M2

## 2017-06-08 DIAGNOSIS — M51.36 DEGENERATION OF LUMBAR INTERVERTEBRAL DISC: Primary | ICD-10-CM

## 2017-06-08 DIAGNOSIS — F11.20 OPIOID DEPENDENCE, UNCOMPLICATED: ICD-10-CM

## 2017-06-08 PROCEDURE — 99214 OFFICE O/P EST MOD 30 MIN: CPT | Mod: S$GLB,,,

## 2017-06-08 PROCEDURE — 99999 PR PBB SHADOW E&M-EST. PATIENT-LVL III: CPT | Mod: PBBFAC,,,

## 2017-06-08 PROCEDURE — 99499 UNLISTED E&M SERVICE: CPT | Mod: S$GLB,,,

## 2017-06-08 RX ORDER — HYDROCODONE BITARTRATE AND ACETAMINOPHEN 10; 325 MG/1; MG/1
1 TABLET ORAL EVERY 6 HOURS PRN
Qty: 120 TABLET | Refills: 0 | Status: SHIPPED | OUTPATIENT
Start: 2017-07-08 | End: 2017-08-07

## 2017-06-08 RX ORDER — HYDROCODONE BITARTRATE AND ACETAMINOPHEN 10; 325 MG/1; MG/1
1 TABLET ORAL EVERY 6 HOURS PRN
Qty: 120 TABLET | Refills: 0 | Status: SHIPPED | OUTPATIENT
Start: 2017-06-08 | End: 2017-07-08

## 2017-06-08 RX ORDER — HYDROCODONE BITARTRATE AND ACETAMINOPHEN 10; 325 MG/1; MG/1
1 TABLET ORAL EVERY 6 HOURS PRN
Qty: 120 TABLET | Refills: 0 | Status: SHIPPED | OUTPATIENT
Start: 2017-08-08 | End: 2017-09-06

## 2017-06-08 NOTE — PROGRESS NOTES
Subjective:       Patient ID: Mike Barcenas Jr. is a 51 y.o. male.    Chief Complaint: Back Pain    HPI The patient presents for medical management of chronic pain. He has chronic pain in his back. He has been treated with opioid pain medications for years because he failed to improve with more conservative treatments such as OTC medications, Non-opioid pain medications, therapy and other modalities. He is compliant with treatment. I reviewed his profile on the [facility] Prescription Monitoring Program Website and he appears to be compliant. His last refill of hydrocodone-APAP  # 120 was on May 12, 2017. He has severe daily pain. Pain medications allow him to perform activities of daily living that he would otherwise not be able to perform. He denies any ongoing substance abuse. He has signed a pain management agreement and understands the risk, benefits and alternatives of treatment with chronic opioid pain medications including the risk of tolerance and dependency. He is taking benzo under the care of a psychiatrist. They are weaning diazepam.       Review of Systems   Constitutional: Negative.    Respiratory: Negative.  Negative for shortness of breath.    Cardiovascular: Negative for chest pain.   Psychiatric/Behavioral: Negative.    All other systems reviewed and are negative.      Objective:      Vitals:    06/08/17 1103   BP: 120/60   Pulse: 73     Physical Exam   Constitutional: He is oriented to person, place, and time. He appears well-developed and well-nourished. He is cooperative. No distress.   HENT:   Head: Normocephalic and atraumatic.   Right Ear: Hearing, tympanic membrane, external ear and ear canal normal.   Left Ear: Hearing, external ear and ear canal normal.   Nose: Nose normal.   Mouth/Throat: Oropharynx is clear and moist.   Eyes: Conjunctivae are normal. Pupils are equal, round, and reactive to light.   Neck: Normal range of motion. Neck supple. No thyromegaly present.    Cardiovascular: Normal rate, regular rhythm, normal heart sounds and intact distal pulses.    Pulmonary/Chest: Effort normal and breath sounds normal. No respiratory distress.   Musculoskeletal: Normal range of motion. He exhibits no edema or tenderness.   Lymphadenopathy:     He has no cervical adenopathy.   Neurological: He is alert and oriented to person, place, and time. He has normal strength. Coordination and gait normal.   Skin: Skin is warm, dry and intact. No cyanosis. Nails show no clubbing.   Psychiatric: He has a normal mood and affect. His speech is normal and behavior is normal. Judgment and thought content normal. Cognition and memory are normal.   Vitals reviewed.      Assessment:       1. Degeneration of lumbar intervertebral disc    2. Opioid dependence, uncomplicated        Plan:       Degeneration of lumbar intervertebral disc    Opioid dependence, uncomplicated    Other orders  -     hydrocodone-acetaminophen 10-325mg (NORCO)  mg Tab; Take 1 tablet by mouth every 6 (six) hours as needed.  Dispense: 120 tablet; Refill: 0  -     hydrocodone-acetaminophen 10-325mg (NORCO)  mg Tab; Take 1 tablet by mouth every 6 (six) hours as needed.  Dispense: 120 tablet; Refill: 0  -     hydrocodone-acetaminophen 10-325mg (NORCO)  mg Tab; Take 1 tablet by mouth every 6 (six) hours as needed.  Dispense: 120 tablet; Refill: 0      Return in about 3 months (around 9/8/2017).

## 2017-06-19 DIAGNOSIS — I10 ESSENTIAL HYPERTENSION: ICD-10-CM

## 2017-06-19 RX ORDER — LISINOPRIL 10 MG/1
TABLET ORAL
Qty: 45 TABLET | Refills: 3 | Status: SHIPPED | OUTPATIENT
Start: 2017-06-19 | End: 2018-06-07 | Stop reason: SDUPTHER

## 2017-06-19 RX ORDER — AMLODIPINE BESYLATE 10 MG/1
TABLET ORAL
Qty: 90 TABLET | Refills: 3 | Status: SHIPPED | OUTPATIENT
Start: 2017-06-19 | End: 2018-07-19 | Stop reason: SDUPTHER

## 2017-07-17 RX ORDER — FENOFIBRATE 160 MG/1
TABLET ORAL
Qty: 90 TABLET | Refills: 3 | Status: SHIPPED | OUTPATIENT
Start: 2017-07-17 | End: 2018-05-31

## 2017-07-24 ENCOUNTER — PATIENT MESSAGE (OUTPATIENT)
Dept: FAMILY MEDICINE | Facility: CLINIC | Age: 52
End: 2017-07-24

## 2017-07-24 ENCOUNTER — OFFICE VISIT (OUTPATIENT)
Dept: PSYCHIATRY | Facility: CLINIC | Age: 52
End: 2017-07-24
Payer: MEDICARE

## 2017-07-24 DIAGNOSIS — E78.00 HYPERCHOLESTEROLEMIA: Primary | ICD-10-CM

## 2017-07-24 DIAGNOSIS — F31.31 BIPOLAR 1 DISORDER, DEPRESSED, MILD: Primary | ICD-10-CM

## 2017-07-24 PROCEDURE — 99499 UNLISTED E&M SERVICE: CPT | Mod: S$GLB,,, | Performed by: SOCIAL WORKER

## 2017-07-24 PROCEDURE — 90834 PSYTX W PT 45 MINUTES: CPT | Mod: S$GLB,,, | Performed by: SOCIAL WORKER

## 2017-07-24 NOTE — PROGRESS NOTES
Individual Psychotherapy (PhD/LCSW)    7/24/2017    Site:  [facility]         Therapeutic Intervention: Met with patient.  Outpatient  - Insight oriented psychotherapy 45-50  min - [id number]    Chief complaint/reason for encounter: bipolar     Interval history and content of current session:   Pt has shaved. He is joyful.  He has made a very good male friend and is more connected with the community.  He is selling and harvesting produce and eggs. He comes across as more relaxed, secured, and happy.  There was no hint of distrust.   Supportive therapy.        Treatment plan:  · Target symptoms: mood stability  · Why chosen therapy is appropriate versus another modality: relevant to diagnosis  · Outcome monitoring methods: self-report, observation  · Therapeutic intervention type: behavior modifying psychotherapy, supportive psychotherapy    Risk parameters:  Patient reports no suicidal ideation  Patient reports no homicidal ideation  Patient reports no self-injurious behavior  Patient reports no violent behavior    Verbal deficits: None    Patient's response to intervention:  The patient's response to intervention is accepting.    Progress toward goals and other mental status changes:  The patient's progress toward goals is good.    Diagnosis: bipolar disorder    Plan:  individual psychotherapy    Return to clinic: 3 months.  Pt request 3 mo.                                                                                     Individual Psychotherapy (PhD/LCSW)    7/24/2017    Site:  [facility]         Therapeutic Intervention: Met with patient.  Outpatient  - Insight oriented psychotherapy 45-50  min - [id number]    Chief complaint/reason for encounter: bipolar     Interval history and content of current session:   Salient event he lost his cat of 19 years two days ago.  He was properly tearful as he discussed the events.  He is doing well.  Has multiple projects.  Barely drinks alcohol.  He likes it living at  father's house.       Treatment plan:  · Target symptoms: mood stability  · Why chosen therapy is appropriate versus another modality: relevant to diagnosis  · Outcome monitoring methods: self-report, observation  · Therapeutic intervention type: behavior modifying psychotherapy, supportive psychotherapy    Risk parameters:  Patient reports no suicidal ideation  Patient reports no homicidal ideation  Patient reports no self-injurious behavior  Patient reports no violent behavior    Verbal deficits: None    Patient's response to intervention:  The patient's response to intervention is accepting.    Progress toward goals and other mental status changes:  The patient's progress toward goals is fair .    Diagnosis: bipolar disorder    Plan:  individual psychotherapy    Return to clinic: 3 months.  Pt request 3 mo.

## 2017-08-04 ENCOUNTER — PATIENT MESSAGE (OUTPATIENT)
Dept: FAMILY MEDICINE | Facility: CLINIC | Age: 52
End: 2017-08-04

## 2017-08-28 ENCOUNTER — OFFICE VISIT (OUTPATIENT)
Dept: PSYCHIATRY | Facility: CLINIC | Age: 52
End: 2017-08-28
Payer: MEDICARE

## 2017-08-28 VITALS
HEART RATE: 68 BPM | WEIGHT: 206 LBS | HEIGHT: 69 IN | DIASTOLIC BLOOD PRESSURE: 72 MMHG | SYSTOLIC BLOOD PRESSURE: 124 MMHG | BODY MASS INDEX: 30.51 KG/M2

## 2017-08-28 DIAGNOSIS — F31.31 BIPOLAR 1 DISORDER, DEPRESSED, MILD: Primary | ICD-10-CM

## 2017-08-28 DIAGNOSIS — Z79.899 ENCOUNTER FOR LONG-TERM (CURRENT) USE OF MEDICATIONS: ICD-10-CM

## 2017-08-28 PROCEDURE — 99999 PR PBB SHADOW E&M-EST. PATIENT-LVL II: CPT | Mod: PBBFAC,,, | Performed by: PSYCHIATRY & NEUROLOGY

## 2017-08-28 PROCEDURE — 99499 UNLISTED E&M SERVICE: CPT | Mod: S$GLB,,, | Performed by: PSYCHIATRY & NEUROLOGY

## 2017-08-28 PROCEDURE — 90833 PSYTX W PT W E/M 30 MIN: CPT | Mod: ,,, | Performed by: PSYCHIATRY & NEUROLOGY

## 2017-08-28 PROCEDURE — 99214 OFFICE O/P EST MOD 30 MIN: CPT | Mod: S$GLB,,, | Performed by: PSYCHIATRY & NEUROLOGY

## 2017-08-28 PROCEDURE — 3008F BODY MASS INDEX DOCD: CPT | Mod: S$GLB,,, | Performed by: PSYCHIATRY & NEUROLOGY

## 2017-08-28 PROCEDURE — 3078F DIAST BP <80 MM HG: CPT | Mod: S$GLB,,, | Performed by: PSYCHIATRY & NEUROLOGY

## 2017-08-28 PROCEDURE — 3074F SYST BP LT 130 MM HG: CPT | Mod: S$GLB,,, | Performed by: PSYCHIATRY & NEUROLOGY

## 2017-08-28 RX ORDER — DIAZEPAM 5 MG/1
5 TABLET ORAL 2 TIMES DAILY PRN
Qty: 60 TABLET | Refills: 3 | Status: SHIPPED | OUTPATIENT
Start: 2017-08-28 | End: 2017-12-04 | Stop reason: SDUPTHER

## 2017-08-28 RX ORDER — FLUOXETINE HYDROCHLORIDE 20 MG/1
20 CAPSULE ORAL EVERY MORNING
Qty: 90 CAPSULE | Refills: 0 | Status: SHIPPED | OUTPATIENT
Start: 2017-08-28 | End: 2017-11-26 | Stop reason: SDUPTHER

## 2017-08-28 RX ORDER — QUETIAPINE FUMARATE 300 MG/1
300 TABLET, FILM COATED ORAL NIGHTLY
Qty: 90 TABLET | Refills: 0 | Status: SHIPPED | OUTPATIENT
Start: 2017-08-28 | End: 2017-12-04 | Stop reason: SDUPTHER

## 2017-08-28 RX ORDER — DIVALPROEX SODIUM 500 MG/1
TABLET, DELAYED RELEASE ORAL
Qty: 450 TABLET | Refills: 0 | Status: SHIPPED | OUTPATIENT
Start: 2017-08-28 | End: 2017-12-04 | Stop reason: SDUPTHER

## 2017-08-28 NOTE — PROGRESS NOTES
Outpatient Psychiatry Follow-Up Visit (MD/NP)    8/28/2017    Clinical Status of Patient:  Outpatient (Ambulatory)    Chief Complaint:  Mike Barcenas Jr. is a 52 y.o. male who presents today for follow-up of bipolar disorder and anxiety  Met with patient.      Interval History and Content of Current Session:  Interim Events/Subjective Report/Content of Current Session:  52 y.o. male previously treated by Dr. Jj.  Years of treatment for depression and comorbid substance issues with a lot of irritability and behavioral problems.  Switched to bipolar regime in 2009 and did 2 weeks at [facility].  Since then much more stable.  No behavioral issues or substance issues.      Pt reports he has been well.  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  He enjoys this a great deal.  He enjoys spending time with a friend and his father.  He is an honorary member of the [employer] [place].  He is the co-manager to the farmers market as well.     He feels he could go down more on the valium.  He also asks about decreasing the seroquel.  He sleeps about 10-11 hrs nightly.  He denies sadness.  He eats well.             Psychotherapy:    · Target symptoms: anxiety , mood swings  · Why chosen therapy is appropriate versus another modality: relevant to diagnosis  · Outcome monitoring methods: self-report, observation  · Therapeutic intervention type: supportive psychotherapy  · Topics discussed/themes: dedication to personal growth, building skills sets for symptom management, symptom recognition, financial stressors, life stage transitional issues,   · The patient's response to the intervention is accepting. The patient's progress toward treatment goals is good.   · Duration of intervention: 18 minutes    Review of Systems   · PSYCHIATRIC: Pertinant items are noted in the narrative.    Past Medical, Family and Social History: The patient's past medical, family and social history have been reviewed and updated as appropriate within  "the electronic medical record - see encounter notes.    seroquel 400mg qhs   Valium 10mg tid   Prozac 20mg daily  depakote 1000mg qam and 1500mg qhs       Wt Readings from Last 3 Encounters:   08/28/17 93.4 kg (206 lb)   06/08/17 89.4 kg (197 lb 1.5 oz)   05/08/17 91.6 kg (202 lb)     Lab Results   Component Value Date    VALPROATE 96.9 06/05/2017     Lab Results   Component Value Date     (L) 07/18/2016     Lab Results   Component Value Date    ALT 20 07/18/2016    AST 28 07/18/2016    ALKPHOS 59 07/18/2016    BILITOT 0.4 07/18/2016     Compliance: yes  Side effects: None     Risk Parameters:  Patient reports no suicidal ideation  Patient reports no homicidal ideation  Patient reports no self-injurious behavior  Patient reports no violent behavior    Exam (detailed: at least 9 elements; comprehensive: all 15 elements)   Constitutional  Vitals:  Most recent vital signs, dated less than 90 days prior to this appointment, were reviewed.    Vitals:    08/28/17 1110   BP: 124/72   Pulse: 68   Weight: 93.4 kg (206 lb)   Height: 5' 9" (1.753 m)        General:  age appropriate, casually dressed, neatly groomed     Musculoskeletal  Muscle Strength/Tone:  no dyskinesia, no tremor   Gait & Station:  non-ataxic   AIMS completed  Psychiatric  Speech:  not pressured or loud but clearly audible   Mood:  Affect:  euthymic  appropriate, bright, midline   Thought Process:  goal-directed, logical   Associations:  intact   Thought Content:  normal, no suicidality, no homicidality, delusions, or paranoia   Insight:  has awareness of illness   Judgement: behavior is adequate to circumstances   Orientation:  grossly intact   Memory: intact for content of interview   Language: grossly intact   Attention Span & Concentration:  able to focus   Fund of Knowledge:  intact and appropriate to age and level of education     Assessment and Diagnosis   Status/Progress: Based on the examination today, the patient's problem(s) is/are " adequately but not ideally controlled.  New problems have not been presented today .   Comorbidities are not complicating management of the primary condition.  There are no active rule-out diagnoses for this patient at this time.    General Impression:  52 y.o. with bipolar disorder and anxiety currently reasonably well controlled on a regimen that includes daily benzodiazepine use.             Diagnosis:  Bipolar I disorder MRE depressed mild  Anxiety D/O NOS      Intervention/Counseling/Treatment Plan   · Reduce seroquel to 300mg nightly.  · Continue fluoxetine 20mg, depakote 1000am and 1500pm  · Continue valium 5mg bid for now with plan to taper further  · Valproic acid level, CMP, CBC  · Continue psychotherapy with Mr. Tolbert      Return to Clinic: as scheduled in Dec

## 2017-08-30 ENCOUNTER — LAB VISIT (OUTPATIENT)
Dept: LAB | Facility: HOSPITAL | Age: 52
End: 2017-08-30
Payer: MEDICARE

## 2017-08-30 DIAGNOSIS — E78.00 HYPERCHOLESTEROLEMIA: ICD-10-CM

## 2017-08-30 DIAGNOSIS — E11.9 TYPE 2 DIABETES MELLITUS WITHOUT COMPLICATION: ICD-10-CM

## 2017-08-30 LAB
CHOLEST SERPL-MCNC: 125 MG/DL
CHOLEST/HDLC SERPL: 5 {RATIO}
HDLC SERPL-MCNC: 25 MG/DL
HDLC SERPL: 20 %
LDLC SERPL CALC-MCNC: 77.8 MG/DL
NONHDLC SERPL-MCNC: 100 MG/DL
TRIGL SERPL-MCNC: 111 MG/DL

## 2017-08-30 PROCEDURE — 80061 LIPID PANEL: CPT

## 2017-08-30 PROCEDURE — 36415 COLL VENOUS BLD VENIPUNCTURE: CPT | Mod: PO

## 2017-08-30 PROCEDURE — 83036 HEMOGLOBIN GLYCOSYLATED A1C: CPT

## 2017-08-31 LAB
ESTIMATED AVG GLUCOSE: 94 MG/DL
HBA1C MFR BLD HPLC: 4.9 %

## 2017-09-06 ENCOUNTER — OFFICE VISIT (OUTPATIENT)
Dept: FAMILY MEDICINE | Facility: CLINIC | Age: 52
End: 2017-09-06
Payer: MEDICARE

## 2017-09-06 VITALS
OXYGEN SATURATION: 98 % | HEIGHT: 69 IN | HEART RATE: 71 BPM | DIASTOLIC BLOOD PRESSURE: 70 MMHG | SYSTOLIC BLOOD PRESSURE: 100 MMHG | WEIGHT: 208.75 LBS | BODY MASS INDEX: 30.92 KG/M2

## 2017-09-06 DIAGNOSIS — M51.36 DEGENERATION OF LUMBAR INTERVERTEBRAL DISC: Primary | ICD-10-CM

## 2017-09-06 DIAGNOSIS — I10 ESSENTIAL HYPERTENSION: ICD-10-CM

## 2017-09-06 DIAGNOSIS — E11.42 TYPE 2 DIABETES MELLITUS WITH DIABETIC POLYNEUROPATHY, WITHOUT LONG-TERM CURRENT USE OF INSULIN: ICD-10-CM

## 2017-09-06 DIAGNOSIS — F11.20 OPIOID DEPENDENCE, UNCOMPLICATED: ICD-10-CM

## 2017-09-06 PROBLEM — Z12.11 COLON CANCER SCREENING: Status: RESOLVED | Noted: 2017-02-21 | Resolved: 2017-09-06

## 2017-09-06 PROCEDURE — 3074F SYST BP LT 130 MM HG: CPT | Mod: S$GLB,,,

## 2017-09-06 PROCEDURE — 4010F ACE/ARB THERAPY RXD/TAKEN: CPT | Mod: S$GLB,,,

## 2017-09-06 PROCEDURE — 3008F BODY MASS INDEX DOCD: CPT | Mod: S$GLB,,,

## 2017-09-06 PROCEDURE — 3044F HG A1C LEVEL LT 7.0%: CPT | Mod: S$GLB,,,

## 2017-09-06 PROCEDURE — 99214 OFFICE O/P EST MOD 30 MIN: CPT | Mod: S$GLB,,,

## 2017-09-06 PROCEDURE — 3078F DIAST BP <80 MM HG: CPT | Mod: S$GLB,,,

## 2017-09-06 PROCEDURE — 99999 PR PBB SHADOW E&M-EST. PATIENT-LVL III: CPT | Mod: PBBFAC,,,

## 2017-09-06 PROCEDURE — 99499 UNLISTED E&M SERVICE: CPT | Mod: S$GLB,,,

## 2017-09-06 RX ORDER — HYDROCODONE BITARTRATE AND ACETAMINOPHEN 10; 325 MG/1; MG/1
1 TABLET ORAL EVERY 6 HOURS PRN
Qty: 120 TABLET | Refills: 0 | Status: SHIPPED | OUTPATIENT
Start: 2017-11-06 | End: 2017-11-30

## 2017-09-06 RX ORDER — METFORMIN HYDROCHLORIDE 850 MG/1
850 TABLET ORAL
Qty: 90 TABLET | Refills: 3 | Status: SHIPPED | OUTPATIENT
Start: 2017-09-06 | End: 2018-03-01

## 2017-09-06 RX ORDER — HYDROCODONE BITARTRATE AND ACETAMINOPHEN 10; 325 MG/1; MG/1
1 TABLET ORAL EVERY 6 HOURS PRN
Qty: 120 TABLET | Refills: 0 | Status: SHIPPED | OUTPATIENT
Start: 2017-10-06 | End: 2017-11-05

## 2017-09-06 RX ORDER — HYDROCODONE BITARTRATE AND ACETAMINOPHEN 10; 325 MG/1; MG/1
1 TABLET ORAL EVERY 6 HOURS PRN
Qty: 120 TABLET | Refills: 0 | Status: SHIPPED | OUTPATIENT
Start: 2017-09-06 | End: 2017-10-06

## 2017-09-06 NOTE — PROGRESS NOTES
Subjective:       Patient ID: Mike Barcenas Jr. is a 52 y.o. male.    Chief Complaint: Back Pain    HPI The patient presents for medical management of chronic pain. He has chronic pain in his back. He has been treated with opioid pain medications for years because he failed to improve with more conservative treatments such as OTC medications, Non-opioid pain medications, therapy and other modalities. He is compliant with treatment. I reviewed his profile on the [facility] Prescription Monitoring Program Website and he appears to be compliant. His last refill of hydrocodone-APAP  # 120 was on August 8, 2017. He has severe daily pain. Pain medications allow him to perform activities of daily living that he would otherwise not be able to perform. He denies any ongoing substance abuse. He has signed a pain management agreement and understands the risk, benefits and alternatives of treatment with chronic opioid pain medications including the risk of tolerance and dependency. According to the recommendations from the Center for Disease control it is no longer considered to be safe to take benzodiazepines with opioid pain medications. He is working on reducing his dose with his psychiatrist.       Review of Systems   Constitutional: Negative.  Negative for activity change.   Eyes: Negative for discharge.   Respiratory: Negative.  Negative for shortness of breath and wheezing.    Cardiovascular: Negative for chest pain and palpitations.   Gastrointestinal: Negative for constipation, diarrhea and vomiting.   Genitourinary: Negative for difficulty urinating and hematuria.   Neurological: Negative for headaches.   Psychiatric/Behavioral: Negative.  Negative for dysphoric mood.   All other systems reviewed and are negative.      Objective:      Vitals:    09/06/17 1018   BP: 100/70   Pulse: 71     Physical Exam   Constitutional: He is oriented to person, place, and time. He appears well-developed and  well-nourished. He is cooperative. No distress.   HENT:   Head: Normocephalic and atraumatic.   Right Ear: Hearing, tympanic membrane, external ear and ear canal normal.   Left Ear: Hearing, external ear and ear canal normal.   Nose: Nose normal.   Mouth/Throat: Oropharynx is clear and moist.   Eyes: Conjunctivae are normal. Pupils are equal, round, and reactive to light.   Neck: Normal range of motion. Neck supple. No thyromegaly present.   Cardiovascular: Normal rate, regular rhythm, normal heart sounds and intact distal pulses.    Pulmonary/Chest: Effort normal and breath sounds normal. No respiratory distress.   Musculoskeletal: Normal range of motion. He exhibits no edema or tenderness.   Lymphadenopathy:     He has no cervical adenopathy.   Neurological: He is alert and oriented to person, place, and time. He has normal strength. Coordination and gait normal.   Skin: Skin is warm, dry and intact. No cyanosis. Nails show no clubbing.   Psychiatric: He has a normal mood and affect. His speech is normal and behavior is normal. Judgment and thought content normal. Cognition and memory are normal.   Vitals reviewed.      Assessment:       1. Degeneration of lumbar intervertebral disc    2. Essential hypertension    3. Type 2 diabetes mellitus with diabetic polyneuropathy, without long-term current use of insulin    4. Opioid dependence, uncomplicated        Plan:       Degeneration of lumbar intervertebral disc    Essential hypertension    Type 2 diabetes mellitus with diabetic polyneuropathy, without long-term current use of insulin    Opioid dependence, uncomplicated    Other orders  -     metformin (GLUCOPHAGE) 850 MG tablet; Take 1 tablet (850 mg total) by mouth daily with breakfast.  Dispense: 90 tablet; Refill: 3  -     hydrocodone-acetaminophen 10-325mg (NORCO)  mg Tab; Take 1 tablet by mouth every 6 (six) hours as needed.  Dispense: 120 tablet; Refill: 0  -     hydrocodone-acetaminophen 10-325mg  (NORCO)  mg Tab; Take 1 tablet by mouth every 6 (six) hours as needed.  Dispense: 120 tablet; Refill: 0  -     hydrocodone-acetaminophen 10-325mg (NORCO)  mg Tab; Take 1 tablet by mouth every 6 (six) hours as needed.  Dispense: 120 tablet; Refill: 0      Return in about 3 months (around 12/6/2017).

## 2017-09-06 NOTE — PATIENT INSTRUCTIONS
"You are being prescribed opioid pain medications for chronic pain.     Opioid pain medications can cause drug dependence. This means that withdrawal symptoms may occur if you stop using the medicine too quickly.     Opioid pain medications can only be prescribed in compliance with chronic opioid pain medication regulations of the [facility] Board of Medical Examiners, the [facility] board and the Federal Drug Enforcement Agency (VIKAS).     It is important that you are compliant in taking Opioid pain medications strictly as prescribed. You have signed an agreement and are aware of the risk of physical dependency, tolerance or addiction to Opioid pain medications. You have been informed of the side effects associated with this medication including constipation. You are aware that withdrawal symptoms including yawning, sweating, watery eyes, runny nose, anxiety, tremors, aching muscles, hot and cold flashes, goose bumps, abdominal pain, diarrhea and depression can occur if you suddenly discontinue or reduce the dose you are prescribed.     You have been informed that this medication should not be taken during pregnancy and you will inform me if you do plan to become or become pregnant during treatment.     You have agreed not to obtain this or other controlled medications from other physicians while taking Opioid pain medications. You understand that I will review a history of all controlled medications prescribed to you on the [facility] Prescription Monitoring Program Database and that you must report any other controlled medication use to your physician. If you have an emergency or acute pain crisis you will inform me as soon as possible.     You will come for your regularly scheduled appointments which must be at least every 90 days. You may schedule an appointment early and I can prescribe your medication in advance with a "do not fill until" date. If you have not been evaluated in the " past 90 days your prescription will not be renewed. You should schedule an appointment for your return visit before you leave the office today because if I am out of the office for vacation or other reasons there is no other physician that can prescribe this medication in my absence. If you schedule an appointment and I need to cancel that visit my office will contact you to make alternative arrangements.     You should not call for refills of this medication at night, weekends or Holidays without exception.     You have agreed not to use illegal drugs while taking Opioid pain medications including marijuana. You understand that I can request a random drug test at any time and that you have agreed to provide this sample without advanced notice. Failure to comply with this request could result in termination of your treatment with Opioid pain medications.     You understand that lost, stolen, misplaced, spilled medications will not be replaced. It is your responsibility to safeguard your medication. You understand that your medication cannot be filled early for any reason including work schedule conflicts, family emergencies, travel and vacations. You also have agreed not to give or sell Opioid pain medications to anyone.

## 2017-09-07 ENCOUNTER — PATIENT MESSAGE (OUTPATIENT)
Dept: FAMILY MEDICINE | Facility: CLINIC | Age: 52
End: 2017-09-07

## 2017-09-07 RX ORDER — SCOLOPAMINE TRANSDERMAL SYSTEM 1 MG/1
1 PATCH, EXTENDED RELEASE TRANSDERMAL
Qty: 4 PATCH | Refills: 0 | Status: SHIPPED | OUTPATIENT
Start: 2017-09-07 | End: 2017-09-07 | Stop reason: SDUPTHER

## 2017-09-07 RX ORDER — SCOLOPAMINE TRANSDERMAL SYSTEM 1 MG/1
1 PATCH, EXTENDED RELEASE TRANSDERMAL
Qty: 4 PATCH | Refills: 0 | Status: SHIPPED | OUTPATIENT
Start: 2017-09-07 | End: 2018-09-24

## 2017-10-16 ENCOUNTER — PATIENT MESSAGE (OUTPATIENT)
Dept: FAMILY MEDICINE | Facility: CLINIC | Age: 52
End: 2017-10-16

## 2017-10-17 ENCOUNTER — PATIENT MESSAGE (OUTPATIENT)
Dept: FAMILY MEDICINE | Facility: CLINIC | Age: 52
End: 2017-10-17

## 2017-11-27 RX ORDER — FLUOXETINE HYDROCHLORIDE 20 MG/1
CAPSULE ORAL
Qty: 90 CAPSULE | Refills: 0 | Status: SHIPPED | OUTPATIENT
Start: 2017-11-27 | End: 2017-12-04 | Stop reason: SDUPTHER

## 2017-11-30 ENCOUNTER — OFFICE VISIT (OUTPATIENT)
Dept: FAMILY MEDICINE | Facility: CLINIC | Age: 52
End: 2017-11-30
Payer: MEDICARE

## 2017-11-30 VITALS
SYSTOLIC BLOOD PRESSURE: 110 MMHG | HEIGHT: 69 IN | BODY MASS INDEX: 31.79 KG/M2 | DIASTOLIC BLOOD PRESSURE: 80 MMHG | WEIGHT: 214.63 LBS | HEART RATE: 64 BPM | OXYGEN SATURATION: 64 %

## 2017-11-30 DIAGNOSIS — M51.36 DEGENERATION OF LUMBAR INTERVERTEBRAL DISC: Primary | ICD-10-CM

## 2017-11-30 DIAGNOSIS — E11.42 TYPE 2 DIABETES MELLITUS WITH DIABETIC POLYNEUROPATHY, WITHOUT LONG-TERM CURRENT USE OF INSULIN: ICD-10-CM

## 2017-11-30 DIAGNOSIS — F11.20 OPIOID DEPENDENCE, UNCOMPLICATED: ICD-10-CM

## 2017-11-30 PROCEDURE — 99499 UNLISTED E&M SERVICE: CPT | Mod: S$GLB,,,

## 2017-11-30 PROCEDURE — 80307 DRUG TEST PRSMV CHEM ANLYZR: CPT

## 2017-11-30 PROCEDURE — 99214 OFFICE O/P EST MOD 30 MIN: CPT | Mod: S$GLB,,,

## 2017-11-30 PROCEDURE — 99999 PR PBB SHADOW E&M-EST. PATIENT-LVL III: CPT | Mod: PBBFAC,,,

## 2017-11-30 RX ORDER — HYDROCODONE BITARTRATE AND ACETAMINOPHEN 10; 325 MG/1; MG/1
1 TABLET ORAL EVERY 6 HOURS PRN
Qty: 120 TABLET | Refills: 0 | Status: SHIPPED | OUTPATIENT
Start: 2017-12-05 | End: 2018-01-04

## 2017-11-30 RX ORDER — HYDROCODONE BITARTRATE AND ACETAMINOPHEN 10; 325 MG/1; MG/1
1 TABLET ORAL EVERY 6 HOURS PRN
Qty: 120 TABLET | Refills: 0 | Status: SHIPPED | OUTPATIENT
Start: 2018-01-05 | End: 2018-02-04

## 2017-11-30 RX ORDER — HYDROCODONE BITARTRATE AND ACETAMINOPHEN 10; 325 MG/1; MG/1
1 TABLET ORAL EVERY 6 HOURS PRN
Qty: 120 TABLET | Refills: 0 | Status: SHIPPED | OUTPATIENT
Start: 2018-02-05 | End: 2018-03-01

## 2017-11-30 NOTE — PROGRESS NOTES
Subjective:       Patient ID: Mike Barcenas Jr. is a 52 y.o. male.    Chief Complaint: Medication Refill    HPI The patient presents for medical management of chronic pain. He has chronic pain in his back. He has been treated with opioid pain medications for years because he failed to improve with more conservative treatments such as OTC medications, Non-opioid pain medications, therapy and other modalities. He is compliant with treatment. I reviewed his profile on the [facility] Prescription Monitoring Program Website and he appears to be compliant. His last refill of hydrocodone-APAP  # 120 was on November 6, 2017. He has severe daily pain. Pain medications allow him to perform activities of daily living that he would otherwise not be able to perform. He denies any ongoing substance abuse. He has signed a pain management agreement and understands the risk, benefits and alternatives of treatment with chronic opioid pain medications including the risk of tolerance and dependency. 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      Review of Systems   Constitutional: Negative.  Negative for activity change and unexpected weight change.   HENT: Negative for hearing loss, rhinorrhea and trouble swallowing.    Eyes: Negative for discharge and visual disturbance.   Respiratory: Negative.  Negative for chest tightness, shortness of breath and wheezing.    Cardiovascular: Negative for chest pain and palpitations.   Gastrointestinal: Negative for blood in stool, constipation, diarrhea and vomiting.   Endocrine: Negative for polydipsia and polyuria.   Genitourinary: Negative for difficulty urinating, hematuria and urgency.   Musculoskeletal: Negative for arthralgias and joint swelling.   Neurological: Negative for weakness and headaches.   Psychiatric/Behavioral: Negative.  Negative for confusion and dysphoric mood.   All other systems reviewed and are negative.       Objective:      Vitals:    11/30/17 1044   BP: 110/80   Pulse: 64     Physical Exam   Constitutional: He is oriented to person, place, and time. He appears well-developed and well-nourished. He is cooperative. No distress.   HENT:   Head: Normocephalic and atraumatic.   Right Ear: Hearing, tympanic membrane, external ear and ear canal normal.   Left Ear: Hearing, external ear and ear canal normal.   Nose: Nose normal.   Mouth/Throat: Oropharynx is clear and moist.   Eyes: Conjunctivae are normal. Pupils are equal, round, and reactive to light.   Neck: Normal range of motion. Neck supple. No thyromegaly present.   Cardiovascular: Normal rate, regular rhythm, normal heart sounds and intact distal pulses.    Pulmonary/Chest: Effort normal and breath sounds normal. No respiratory distress.   Musculoskeletal: Normal range of motion. He exhibits no edema or tenderness.   Lymphadenopathy:     He has no cervical adenopathy.   Neurological: He is alert and oriented to person, place, and time. He has normal strength. Coordination and gait normal.   Skin: Skin is warm, dry and intact. No cyanosis. Nails show no clubbing.   Psychiatric: He has a normal mood and affect. His speech is normal and behavior is normal. Judgment and thought content normal. Cognition and memory are normal.   Vitals reviewed.      Assessment:       1. Degeneration of lumbar intervertebral disc    2. Type 2 diabetes mellitus with diabetic polyneuropathy, without long-term current use of insulin    3. Opioid dependence, uncomplicated        Plan:       Degeneration of lumbar intervertebral disc  -     Toxicology screen, urine    Type 2 diabetes mellitus with diabetic polyneuropathy, without long-term current use of insulin    Opioid dependence, uncomplicated  -     Toxicology screen, urine    Other orders  -     hydrocodone-acetaminophen 10-325mg (NORCO)  mg Tab; Take 1 tablet by mouth every 6 (six) hours as needed.  Dispense: 120 tablet; Refill:  0  -     hydrocodone-acetaminophen 10-325mg (NORCO)  mg Tab; Take 1 tablet by mouth every 6 (six) hours as needed.  Dispense: 120 tablet; Refill: 0  -     hydrocodone-acetaminophen 10-325mg (NORCO)  mg Tab; Take 1 tablet by mouth every 6 (six) hours as needed.  Dispense: 120 tablet; Refill: 0      Return in about 3 months (around 2/28/2018).

## 2017-11-30 NOTE — PATIENT INSTRUCTIONS
"You are being prescribed opioid pain medications for chronic pain.     Opioid pain medications can cause drug dependence. This means that withdrawal symptoms may occur if you stop using the medicine too quickly.     Opioid pain medications can only be prescribed in compliance with chronic opioid pain medication regulations of the [facility] Board of Medical Examiners, the [facility] board and the Federal Drug Enforcement Agency ([employer]).     It is important that you are compliant in taking Opioid pain medications strictly as prescribed. You have signed an agreement and are aware of the risk of physical dependency, tolerance or addiction to Opioid pain medications. You have been informed of the side effects associated with this medication including constipation. You are aware that withdrawal symptoms including yawning, sweating, watery eyes, runny nose, anxiety, tremors, aching muscles, hot and cold flashes, goose bumps, abdominal pain, diarrhea and depression can occur if you suddenly discontinue or reduce the dose you are prescribed.     You have been informed that this medication should not be taken during pregnancy and you will inform me if you do plan to become or become pregnant during treatment.     You have agreed not to obtain this or other controlled medications from other physicians while taking Opioid pain medications. You understand that I will review a history of all controlled medications prescribed to you on the [facility] Prescription Monitoring Program Database and that you must report any other controlled medication use to your physician. If you have an emergency or acute pain crisis you will inform me as soon as possible.     You will come for your regularly scheduled appointments which must be at least every 90 days. You may schedule an appointment early and I can prescribe your medication in advance with a "do not fill until" date. If you have not been evaluated in the " past 90 days your prescription will not be renewed. You should schedule an appointment for your return visit before you leave the office today because if I am out of the office for vacation or other reasons there is no other physician that can prescribe this medication in my absence. If you schedule an appointment and I need to cancel that visit my office will contact you to make alternative arrangements.     You should not call for refills of this medication at night, weekends or Holidays without exception.     You have agreed not to use illegal drugs while taking Opioid pain medications including marijuana. You understand that I can request a random drug test at any time and that you have agreed to provide this sample without advanced notice. Failure to comply with this request could result in termination of your treatment with Opioid pain medications.     You understand that lost, stolen, misplaced, spilled medications will not be replaced. It is your responsibility to safeguard your medication. You understand that your medication cannot be filled early for any reason including work schedule conflicts, family emergencies, travel and vacations. You also have agreed not to give or sell Opioid pain medications to anyone.

## 2017-12-01 LAB
AMPHET+METHAMPHET UR QL: NEGATIVE
BARBITURATES UR QL SCN>200 NG/ML: NEGATIVE
BENZODIAZ UR QL SCN>200 NG/ML: NORMAL
BZE UR QL SCN: NEGATIVE
CANNABINOIDS UR QL SCN: NEGATIVE
CREAT UR-MCNC: 28 MG/DL
ETHANOL UR-MCNC: <10 MG/DL
METHADONE UR QL SCN>300 NG/ML: NEGATIVE
OPIATES UR QL SCN: NORMAL
PCP UR QL SCN>25 NG/ML: NEGATIVE
TOXICOLOGY INFORMATION: NORMAL

## 2017-12-04 ENCOUNTER — OFFICE VISIT (OUTPATIENT)
Dept: PSYCHIATRY | Facility: CLINIC | Age: 52
End: 2017-12-04
Payer: MEDICARE

## 2017-12-04 VITALS
DIASTOLIC BLOOD PRESSURE: 69 MMHG | SYSTOLIC BLOOD PRESSURE: 140 MMHG | HEART RATE: 67 BPM | WEIGHT: 214.38 LBS | HEIGHT: 69 IN | BODY MASS INDEX: 31.75 KG/M2

## 2017-12-04 DIAGNOSIS — F31.31 BIPOLAR 1 DISORDER, DEPRESSED, MILD: ICD-10-CM

## 2017-12-04 DIAGNOSIS — F41.1 ANXIETY STATE: Primary | ICD-10-CM

## 2017-12-04 PROCEDURE — 99499 UNLISTED E&M SERVICE: CPT | Mod: S$GLB,,, | Performed by: PSYCHIATRY & NEUROLOGY

## 2017-12-04 PROCEDURE — 99999 PR PBB SHADOW E&M-EST. PATIENT-LVL II: CPT | Mod: PBBFAC,,, | Performed by: PSYCHIATRY & NEUROLOGY

## 2017-12-04 PROCEDURE — 99213 OFFICE O/P EST LOW 20 MIN: CPT | Mod: S$GLB,,, | Performed by: PSYCHIATRY & NEUROLOGY

## 2017-12-04 PROCEDURE — 90833 PSYTX W PT W E/M 30 MIN: CPT | Mod: S$GLB,,, | Performed by: PSYCHIATRY & NEUROLOGY

## 2017-12-04 RX ORDER — DIVALPROEX SODIUM 500 MG/1
TABLET, DELAYED RELEASE ORAL
Qty: 450 TABLET | Refills: 0 | Status: SHIPPED | OUTPATIENT
Start: 2017-12-04 | End: 2018-03-26 | Stop reason: SDUPTHER

## 2017-12-04 RX ORDER — DIAZEPAM 5 MG/1
5 TABLET ORAL 2 TIMES DAILY PRN
Qty: 60 TABLET | Refills: 3 | Status: SHIPPED | OUTPATIENT
Start: 2017-12-04 | End: 2018-04-05 | Stop reason: SDUPTHER

## 2017-12-04 RX ORDER — FLUOXETINE HYDROCHLORIDE 20 MG/1
20 CAPSULE ORAL EVERY MORNING
Qty: 90 CAPSULE | Refills: 0 | Status: SHIPPED | OUTPATIENT
Start: 2017-12-04 | End: 2018-05-28 | Stop reason: SDUPTHER

## 2017-12-04 RX ORDER — QUETIAPINE FUMARATE 300 MG/1
300 TABLET, FILM COATED ORAL NIGHTLY
Qty: 90 TABLET | Refills: 0 | Status: SHIPPED | OUTPATIENT
Start: 2017-12-04 | End: 2018-04-09 | Stop reason: SDUPTHER

## 2017-12-04 NOTE — PROGRESS NOTES
Outpatient Psychiatry Follow-Up Visit (MD/NP)    12/4/2017    Clinical Status of Patient:  Outpatient (Ambulatory)    Chief Complaint:  Mike Barcenas Jr. is a 52 y.o. male who presents today for follow-up of bipolar disorder and anxiety  Met with patient.      Interval History and Content of Current Session:  Interim Events/Subjective Report/Content of Current Session:  52 y.o. male with a history of several years of treatment for depression and comorbid substance issues with a lot of irritability and behavioral problems.  Switched to bipolar regime in 2009 [place] [facility] [place]  Since then much more stable.  No behavioral issues or substance issues.      Pt reports he has been doing well.  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  He is making salsa and pickled eggs.  Does not feel sad.  Denies crying spells.  Denies desire for death or desire to harm others.      Going down on seroquel worked well.  He sleeps well.  He does not feel tired during the day.  He takes valium 1/2 tab qid and desires to decrease the dose.     Still living with his father for a year now and this is going well.  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          Psychotherapy:    · Target symptoms: anxiety , mood swings  · Why chosen therapy is appropriate versus another modality: relevant to diagnosis  · Outcome monitoring methods: self-report, observation  · Therapeutic intervention type: supportive psychotherapy  · Topics discussed/themes: building skills sets for symptom management, symptom recognition, financial stressors, life stage transitional issues,   · The patient's response to the intervention is accepting. The patient's progress toward treatment goals is good.   · Duration of intervention: 18 minutes    Review of Systems   · PSYCHIATRIC: Pertinant items are noted in the narrative.  · CONSTITUTIONAL: Positive for weight gain.   · RESPIRATORY: No shortness of  "breath.  · CARDIOVASCULAR: No tachycardia or chest pain.  · GASTROINTESTINAL: No nausea, vomiting, pain, constipation or diarrhea.    Past Medical, Family and Social History: The patient's past medical, family and social history have been reviewed and updated as appropriate within the electronic medical record - see encounter notes.    seroquel 400mg qhs   Valium 10mg tid   Prozac 20mg daily  depakote 1000mg qam and 1500mg qhs       Wt Readings from Last 3 Encounters:   11/30/17 97.3 kg (214 lb 9.6 oz)   09/06/17 94.7 kg (208 lb 12.4 oz)   08/28/17 93.4 kg (206 lb)     Lab Results   Component Value Date    VALPROATE 96.9 06/05/2017     Lab Results   Component Value Date     (L) 07/18/2016     Lab Results   Component Value Date    ALT 20 07/18/2016    AST 28 07/18/2016    ALKPHOS 59 07/18/2016    BILITOT 0.4 07/18/2016     Compliance: yes  Side effects: None     Risk Parameters:  Patient reports no suicidal ideation  Patient reports no homicidal ideation  Patient reports no self-injurious behavior  Patient reports no violent behavior    Exam (detailed: at least 9 elements; comprehensive: all 15 elements)   Constitutional  Vitals:  Most recent vital signs, dated less than 90 days prior to this appointment, were reviewed.    Vitals:    12/04/17 1007   BP: (!) 140/69   Pulse: 67   Weight: 97.3 kg (214 lb 6.4 oz)   Height: 5' 9" (1.753 m)        General:  age appropriate, casually dressed, neatly groomed     Musculoskeletal  Muscle Strength/Tone:  no dyskinesia, no tremor   Gait & Station:  non-ataxic   AIMS completed  Psychiatric  Speech:  not pressured or loud but clearly audible   Mood:  Affect:  euthymic  appropriate, bright, midline   Thought Process:  goal-directed, logical   Associations:  intact   Thought Content:  normal, no suicidality, no homicidality, delusions, or paranoia   Insight:  has awareness of illness   Judgement: behavior is adequate to circumstances   Orientation:  grossly intact   Memory: " intact for content of interview   Language: grossly intact   Attention Span & Concentration:  able to focus   Fund of Knowledge:  intact and appropriate to age and level of education     Assessment and Diagnosis   Status/Progress: Based on the examination today, the patient's problem(s) is/are adequately but not ideally controlled.  New problems have not been presented today .   Comorbidities are not complicating management of the primary condition.  There are no active rule-out diagnoses for this patient at this time.    General Impression:  52 y.o. with bipolar disorder and anxiety currently reasonably well controlled on a regimen that includes daily benzodiazepine use.             Diagnosis:  Bipolar I disorder MRE depressed mild  Anxiety D/O NOS      Intervention/Counseling/Treatment Plan   · Continue Seroquel 300 mg nightly.  · Reduce valium by elimnating one of the four 2.5 mg doses he takes daily.  May decrease another by  2.5 mg after 2 weeks.    · Continue fluoxetine 20mg, depakote 1000am and 1500pm  · Valproic acid level, CMP, CBC, which was not done with Dr. Parker's labs   · Continue psychotherapy with Priyanka Tomasz      Return to Clinic: 3 months

## 2018-01-28 ENCOUNTER — PATIENT MESSAGE (OUTPATIENT)
Dept: FAMILY MEDICINE | Facility: CLINIC | Age: 53
End: 2018-01-28

## 2018-01-28 DIAGNOSIS — E11.42 TYPE 2 DIABETES MELLITUS WITH DIABETIC POLYNEUROPATHY, WITHOUT LONG-TERM CURRENT USE OF INSULIN: Primary | ICD-10-CM

## 2018-01-28 DIAGNOSIS — I10 ESSENTIAL HYPERTENSION: ICD-10-CM

## 2018-01-29 ENCOUNTER — PATIENT MESSAGE (OUTPATIENT)
Dept: FAMILY MEDICINE | Facility: CLINIC | Age: 53
End: 2018-01-29

## 2018-02-14 ENCOUNTER — LAB VISIT (OUTPATIENT)
Dept: LAB | Facility: HOSPITAL | Age: 53
End: 2018-02-14
Attending: PSYCHIATRY & NEUROLOGY
Payer: MEDICARE

## 2018-02-14 DIAGNOSIS — Z79.899 ENCOUNTER FOR LONG-TERM (CURRENT) USE OF MEDICATIONS: ICD-10-CM

## 2018-02-14 LAB
ALBUMIN SERPL BCP-MCNC: 3.8 G/DL
ALP SERPL-CCNC: 60 U/L
ALT SERPL W/O P-5'-P-CCNC: 9 U/L
ANION GAP SERPL CALC-SCNC: 6 MMOL/L
AST SERPL-CCNC: 28 U/L
BASOPHILS # BLD AUTO: 0.06 K/UL
BASOPHILS NFR BLD: 1 %
BILIRUB SERPL-MCNC: 0.4 MG/DL
BUN SERPL-MCNC: 25 MG/DL
CALCIUM SERPL-MCNC: 9.9 MG/DL
CHLORIDE SERPL-SCNC: 109 MMOL/L
CO2 SERPL-SCNC: 27 MMOL/L
CREAT SERPL-MCNC: 0.8 MG/DL
DIFFERENTIAL METHOD: ABNORMAL
EOSINOPHIL # BLD AUTO: 0.1 K/UL
EOSINOPHIL NFR BLD: 1.2 %
ERYTHROCYTE [DISTWIDTH] IN BLOOD BY AUTOMATED COUNT: 13.5 %
EST. GFR  (AFRICAN AMERICAN): >60 ML/MIN/1.73 M^2
EST. GFR  (NON AFRICAN AMERICAN): >60 ML/MIN/1.73 M^2
GLUCOSE SERPL-MCNC: 123 MG/DL
HCT VFR BLD AUTO: 41.8 %
HGB BLD-MCNC: 14.2 G/DL
IMM GRANULOCYTES # BLD AUTO: 0.02 K/UL
IMM GRANULOCYTES NFR BLD AUTO: 0.3 %
LYMPHOCYTES # BLD AUTO: 2.9 K/UL
LYMPHOCYTES NFR BLD: 47.1 %
MCH RBC QN AUTO: 32.9 PG
MCHC RBC AUTO-ENTMCNC: 34 G/DL
MCV RBC AUTO: 97 FL
MONOCYTES # BLD AUTO: 0.3 K/UL
MONOCYTES NFR BLD: 5.3 %
NEUTROPHILS # BLD AUTO: 2.7 K/UL
NEUTROPHILS NFR BLD: 45.1 %
NRBC BLD-RTO: 0 /100 WBC
PLATELET # BLD AUTO: 120 K/UL
PMV BLD AUTO: 13.2 FL
POTASSIUM SERPL-SCNC: 5.4 MMOL/L
PROT SERPL-MCNC: 6.9 G/DL
RBC # BLD AUTO: 4.31 M/UL
SODIUM SERPL-SCNC: 142 MMOL/L
VALPROATE SERPL-MCNC: 76.2 UG/ML
WBC # BLD AUTO: 6.05 K/UL

## 2018-02-14 PROCEDURE — 80053 COMPREHEN METABOLIC PANEL: CPT | Mod: 91

## 2018-02-14 PROCEDURE — 80164 ASSAY DIPROPYLACETIC ACD TOT: CPT

## 2018-02-14 PROCEDURE — 85025 COMPLETE CBC W/AUTO DIFF WBC: CPT | Mod: 91

## 2018-02-14 PROCEDURE — 36415 COLL VENOUS BLD VENIPUNCTURE: CPT | Mod: PO

## 2018-02-23 ENCOUNTER — TELEPHONE (OUTPATIENT)
Dept: FAMILY MEDICINE | Facility: CLINIC | Age: 53
End: 2018-02-23

## 2018-02-23 NOTE — TELEPHONE ENCOUNTER
----- Message from Criss Lind sent at 2/23/2018  8:19 AM CST -----  Contact: [phone]/self  Patient called in returning your call. Please advise.

## 2018-03-01 ENCOUNTER — OFFICE VISIT (OUTPATIENT)
Dept: FAMILY MEDICINE | Facility: CLINIC | Age: 53
End: 2018-03-01
Payer: MEDICARE

## 2018-03-01 VITALS
DIASTOLIC BLOOD PRESSURE: 60 MMHG | HEIGHT: 69 IN | SYSTOLIC BLOOD PRESSURE: 114 MMHG | HEART RATE: 63 BPM | OXYGEN SATURATION: 96 % | WEIGHT: 209 LBS | BODY MASS INDEX: 30.96 KG/M2

## 2018-03-01 DIAGNOSIS — E11.42 TYPE 2 DIABETES MELLITUS WITH DIABETIC POLYNEUROPATHY, WITHOUT LONG-TERM CURRENT USE OF INSULIN: ICD-10-CM

## 2018-03-01 DIAGNOSIS — F31.31 BIPOLAR AFFECTIVE DISORDER, CURRENTLY DEPRESSED, MILD: ICD-10-CM

## 2018-03-01 DIAGNOSIS — F11.20 OPIOID DEPENDENCE, UNCOMPLICATED: ICD-10-CM

## 2018-03-01 DIAGNOSIS — L57.0 ACTINIC KERATOSIS: ICD-10-CM

## 2018-03-01 DIAGNOSIS — L72.3 SEBACEOUS CYST: ICD-10-CM

## 2018-03-01 DIAGNOSIS — L82.1 SEBORRHEIC KERATOSES: ICD-10-CM

## 2018-03-01 DIAGNOSIS — M51.36 DEGENERATION OF LUMBAR INTERVERTEBRAL DISC: Primary | ICD-10-CM

## 2018-03-01 DIAGNOSIS — F31.9 BIPOLAR I DISORDER: ICD-10-CM

## 2018-03-01 PROCEDURE — 3078F DIAST BP <80 MM HG: CPT | Mod: S$GLB,,,

## 2018-03-01 PROCEDURE — 99214 OFFICE O/P EST MOD 30 MIN: CPT | Mod: S$GLB,,,

## 2018-03-01 PROCEDURE — 3074F SYST BP LT 130 MM HG: CPT | Mod: S$GLB,,,

## 2018-03-01 PROCEDURE — 99999 PR PBB SHADOW E&M-EST. PATIENT-LVL III: CPT | Mod: PBBFAC,,,

## 2018-03-01 PROCEDURE — 99499 UNLISTED E&M SERVICE: CPT | Mod: S$GLB,,,

## 2018-03-01 RX ORDER — VITAMIN E 268 MG
CAPSULE ORAL
COMMUNITY
Start: 2018-02-04 | End: 2018-03-01 | Stop reason: SDUPTHER

## 2018-03-01 RX ORDER — HYDROCODONE BITARTRATE AND ACETAMINOPHEN 10; 325 MG/1; MG/1
1 TABLET ORAL EVERY 6 HOURS PRN
Qty: 120 TABLET | Refills: 0 | Status: SHIPPED | OUTPATIENT
Start: 2018-04-01 | End: 2018-05-01

## 2018-03-01 RX ORDER — HYDROCODONE BITARTRATE AND ACETAMINOPHEN 10; 325 MG/1; MG/1
1 TABLET ORAL EVERY 6 HOURS PRN
Qty: 120 TABLET | Refills: 0 | Status: SHIPPED | OUTPATIENT
Start: 2018-03-01 | End: 2018-03-31

## 2018-03-01 RX ORDER — MULTIVITAMIN
TABLET ORAL
COMMUNITY
Start: 2018-02-04

## 2018-03-01 RX ORDER — HYDROCODONE BITARTRATE AND ACETAMINOPHEN 10; 325 MG/1; MG/1
1 TABLET ORAL EVERY 6 HOURS PRN
Qty: 120 TABLET | Refills: 0 | Status: SHIPPED | OUTPATIENT
Start: 2018-05-01 | End: 2018-05-31 | Stop reason: SDUPTHER

## 2018-03-01 NOTE — PATIENT INSTRUCTIONS
"You are being prescribed opioid pain medications for chronic pain.     Opioid pain medications can cause drug dependence. This means that withdrawal symptoms may occur if you stop using the medicine too quickly.     Opioid pain medications can only be prescribed in compliance with chronic opioid pain medication regulations of the [facility] Board of Medical Examiners, the [facility] board and the Federal Drug Enforcement Agency ([employer]).     It is important that you are compliant in taking Opioid pain medications strictly as prescribed. You have signed an agreement and are aware of the risk of physical dependency, tolerance or addiction to Opioid pain medications. You have been informed of the side effects associated with this medication including constipation. You are aware that withdrawal symptoms including yawning, sweating, watery eyes, runny nose, anxiety, tremors, aching muscles, hot and cold flashes, goose bumps, abdominal pain, diarrhea and depression can occur if you suddenly discontinue or reduce the dose you are prescribed.     You have been informed that this medication should not be taken during pregnancy and you will inform me if you do plan to become or become pregnant during treatment.     You have agreed not to obtain this or other controlled medications from other physicians while taking Opioid pain medications. You understand that I will review a history of all controlled medications prescribed to you on the [facility] Prescription Monitoring Program Database and that you must report any other controlled medication use to your physician. If you have an emergency or acute pain crisis you will inform me as soon as possible.     You will come for your regularly scheduled appointments which must be at least every 90 days. You may schedule an appointment early and I can prescribe your medication in advance with a "do not fill until" date. If you have not been evaluated in the " past 90 days your prescription will not be renewed. You should schedule an appointment for your return visit before you leave the office today because if I am out of the office for vacation or other reasons there is no other physician that can prescribe this medication in my absence. If you schedule an appointment and I need to cancel that visit my office will contact you to make alternative arrangements.     You should not call for refills of this medication at night, weekends or Holidays without exception.     You have agreed not to use illegal drugs while taking Opioid pain medications including marijuana. You understand that I can request a random drug test at any time and that you have agreed to provide this sample without advanced notice. Failure to comply with this request could result in termination of your treatment with Opioid pain medications.     You understand that lost, stolen, misplaced, spilled medications will not be replaced. It is your responsibility to safeguard your medication. You understand that your medication cannot be filled early for any reason including work schedule conflicts, family emergencies, travel and vacations. You also have agreed not to give or sell Opioid pain medications to anyone.

## 2018-03-01 NOTE — PROGRESS NOTES
Subjective:       Patient ID: Mike Barcenas Jr. is a 52 y.o. male.    Chief Complaint: Medication Refill and Cyst    HPI The patient presents for medical management of chronic pain. He has chronic pain in his back. He has been treated with opioid pain medications for years because he failed to improve with more conservative treatments such as OTC medications, Non-opioid pain medications, therapy and other modalities. He is compliant with treatment. I reviewed his profile on the [facility] Prescription Monitoring Program Website and he appears to be compliant. His last refill of hydrocodone-APAP  # 120 was on February 5, 2018. He has severe daily pain. Pain medications allow him to perform activities of daily living that he would otherwise not be able to perform. He denies any ongoing substance abuse. He has signed a pain management agreement and understands the risk, benefits and alternatives of treatment with chronic opioid pain medications including the risk of tolerance and dependency. He is weaning diazepam under the supervision of Dr. Peralta. He is on a weight watcher diet and has lost weight.       His Hgb A1C has been normal for over 3 years. He is only taking one metformin a day.   Diabetes Management Status    Statin: Not taking  ACE/ARB: Taking    Screening or Prevention Patient's value Goal Complete/Controlled?   HgA1C Testing and Control   Lab Results   Component Value Date    HGBA1C 5.1 02/14/2018      Annually/Less than 8% Yes   Lipid profile : 02/14/2018 Annually Yes   LDL control Lab Results   Component Value Date    LDLCALC 62.4 (L) 02/14/2018    Annually/Less than 100 mg/dl  Yes   Nephropathy screening Lab Results   Component Value Date    LABMICR 5.0 07/18/2016     Lab Results   Component Value Date    PROTEINUA 1+ (A) 05/27/2014    Annually No   Blood pressure BP Readings from Last 1 Encounters:   03/01/18 114/60    Less than 140/90 Yes   Dilated retinal exam : 08/22/2017  Annually Yes   Foot exam   Most Recent Foot Exam Date: Not Found Annually Yes         Review of Systems   Constitutional: Negative.  Negative for activity change and unexpected weight change.   HENT: Negative for hearing loss, rhinorrhea and trouble swallowing.    Eyes: Positive for visual disturbance. Negative for discharge.   Respiratory: Negative.  Negative for chest tightness, shortness of breath and wheezing.    Cardiovascular: Negative for chest pain and palpitations.   Gastrointestinal: Negative for blood in stool, constipation, diarrhea and vomiting.   Endocrine: Negative for polydipsia and polyuria.   Genitourinary: Negative for difficulty urinating, hematuria and urgency.   Musculoskeletal: Negative for arthralgias, joint swelling and neck pain.   Skin:        He has a AK and SK on his left arm and a sebaceous cyst on his back    Neurological: Negative for weakness and headaches.   Psychiatric/Behavioral: Negative.  Negative for confusion and dysphoric mood.   All other systems reviewed and are negative.      Objective:      Vitals:    03/01/18 0942   BP: 114/60   Pulse: 63     Physical Exam   Constitutional: He is oriented to person, place, and time. He appears well-developed and well-nourished. He is cooperative. No distress.   HENT:   Head: Normocephalic and atraumatic.   Right Ear: Hearing, tympanic membrane, external ear and ear canal normal.   Left Ear: Hearing, external ear and ear canal normal.   Nose: Nose normal.   Mouth/Throat: Oropharynx is clear and moist.   Eyes: Conjunctivae are normal. Pupils are equal, round, and reactive to light.   Neck: Normal range of motion. Neck supple. No thyromegaly present.   Cardiovascular: Normal rate, regular rhythm, normal heart sounds and intact distal pulses.    Pulmonary/Chest: Effort normal and breath sounds normal. No respiratory distress.   Musculoskeletal: Normal range of motion. He exhibits no edema or tenderness.   Lymphadenopathy:     He has no  cervical adenopathy.   Neurological: He is alert and oriented to person, place, and time. He has normal strength. Coordination and gait normal.   Skin: Skin is warm, dry and intact. No cyanosis. Nails show no clubbing.   Psychiatric: He has a normal mood and affect. His speech is normal and behavior is normal. Judgment and thought content normal. Cognition and memory are normal.   Vitals reviewed.      Assessment:       1. Degeneration of lumbar intervertebral disc    2. Bipolar I disorder    3. Type 2 diabetes mellitus with diabetic polyneuropathy, without long-term current use of insulin    4. Bipolar affective disorder, currently depressed, mild    5. Opioid dependence, uncomplicated    6. Actinic keratosis    7. Seborrheic keratoses    8. Sebaceous cyst        Plan:       Degeneration of lumbar intervertebral disc    Bipolar I disorder    Type 2 diabetes mellitus with diabetic polyneuropathy, without long-term current use of insulin    Bipolar affective disorder, currently depressed, mild    Opioid dependence, uncomplicated    Actinic keratosis    Seborrheic keratoses    Sebaceous cyst    Other orders  -     hydrocodone-acetaminophen 10-325mg (NORCO)  mg Tab; Take 1 tablet by mouth every 6 (six) hours as needed.  Dispense: 120 tablet; Refill: 0  -     hydrocodone-acetaminophen 10-325mg (NORCO)  mg Tab; Take 1 tablet by mouth every 6 (six) hours as needed.  Dispense: 120 tablet; Refill: 0  -     hydrocodone-acetaminophen 10-325mg (NORCO)  mg Tab; Take 1 tablet by mouth every 6 (six) hours as needed.  Dispense: 120 tablet; Refill: 0      Follow-up in about 3 months (around 6/1/2018).

## 2018-03-06 ENCOUNTER — PES CALL (OUTPATIENT)
Dept: ADMINISTRATIVE | Facility: CLINIC | Age: 53
End: 2018-03-06

## 2018-03-14 NOTE — PROGRESS NOTES
Pt arrived too late to be seen.  Saw briefly in hallway.  Discussed low platelets.  Repeat CBC. Reducing or d/c depakote may be required.

## 2018-03-27 RX ORDER — DIVALPROEX SODIUM 500 MG/1
TABLET, DELAYED RELEASE ORAL
Qty: 450 TABLET | Refills: 0 | Status: SHIPPED | OUTPATIENT
Start: 2018-03-27 | End: 2018-05-28 | Stop reason: SDUPTHER

## 2018-04-05 ENCOUNTER — PATIENT MESSAGE (OUTPATIENT)
Dept: PSYCHIATRY | Facility: CLINIC | Age: 53
End: 2018-04-05

## 2018-04-05 RX ORDER — DIAZEPAM 5 MG/1
5 TABLET ORAL 2 TIMES DAILY PRN
Qty: 60 TABLET | Refills: 2 | Status: SHIPPED | OUTPATIENT
Start: 2018-04-05 | End: 2018-06-11 | Stop reason: SDUPTHER

## 2018-04-08 ENCOUNTER — PATIENT MESSAGE (OUTPATIENT)
Dept: PSYCHIATRY | Facility: CLINIC | Age: 53
End: 2018-04-08

## 2018-04-09 DIAGNOSIS — F31.31 BIPOLAR 1 DISORDER, DEPRESSED, MILD: ICD-10-CM

## 2018-04-09 RX ORDER — QUETIAPINE FUMARATE 300 MG/1
300 TABLET, FILM COATED ORAL NIGHTLY
Qty: 90 TABLET | Refills: 0 | Status: SHIPPED | OUTPATIENT
Start: 2018-04-09 | End: 2018-06-11 | Stop reason: SDUPTHER

## 2018-05-21 ENCOUNTER — TELEPHONE (OUTPATIENT)
Dept: FAMILY MEDICINE | Facility: CLINIC | Age: 53
End: 2018-05-21

## 2018-05-21 DIAGNOSIS — M51.36 DEGENERATION OF LUMBAR INTERVERTEBRAL DISC: Primary | ICD-10-CM

## 2018-05-21 DIAGNOSIS — G62.9 POLYNEUROPATHY: Primary | ICD-10-CM

## 2018-05-21 DIAGNOSIS — M51.36 DEGENERATION OF LUMBAR INTERVERTEBRAL DISC: ICD-10-CM

## 2018-05-21 DIAGNOSIS — G62.9 POLYNEUROPATHY: ICD-10-CM

## 2018-05-21 NOTE — TELEPHONE ENCOUNTER
----- Message from Maame Guidry sent at 5/21/2018 10:02 AM CDT -----  Contact: pt [phone]  Pt would like to have a referral to pain management to get med refills. Please call pt at [phone].

## 2018-05-21 NOTE — TELEPHONE ENCOUNTER
----- Message from Jennifer Lee sent at 5/21/2018  4:17 PM CDT -----  Contact: self / [phone]  Patient is requesting a call back regarding, his paper work he said he needs a referral faxed to the below Fax [phone]  Dr Randolph Pate

## 2018-05-21 NOTE — TELEPHONE ENCOUNTER
Chronic pain patient of Dr. Parker needs referral to Dr. Randolph Patel for pain management. He is physical medicine and rehabilitation

## 2018-05-21 NOTE — TELEPHONE ENCOUNTER
----- Message from Yenni Adams sent at 5/21/2018  3:03 PM CDT -----  Contact: [phone] or [phone]/self  Patient requesting to speak with you regarding a referral to pain management doctor. Please advise.

## 2018-05-22 ENCOUNTER — TELEPHONE (OUTPATIENT)
Dept: FAMILY MEDICINE | Facility: CLINIC | Age: 53
End: 2018-05-22

## 2018-05-22 ENCOUNTER — LAB VISIT (OUTPATIENT)
Dept: LAB | Facility: HOSPITAL | Age: 53
End: 2018-05-22
Attending: PSYCHIATRY & NEUROLOGY
Payer: MEDICARE

## 2018-05-22 DIAGNOSIS — Z79.899 ENCOUNTER FOR LONG-TERM (CURRENT) USE OF MEDICATIONS: ICD-10-CM

## 2018-05-22 LAB
BASOPHILS # BLD AUTO: 0.06 K/UL
BASOPHILS NFR BLD: 0.7 %
DIFFERENTIAL METHOD: ABNORMAL
EOSINOPHIL # BLD AUTO: 0.1 K/UL
EOSINOPHIL NFR BLD: 0.8 %
ERYTHROCYTE [DISTWIDTH] IN BLOOD BY AUTOMATED COUNT: 13.8 %
HCT VFR BLD AUTO: 42.8 %
HGB BLD-MCNC: 14.4 G/DL
IMM GRANULOCYTES # BLD AUTO: 0.04 K/UL
IMM GRANULOCYTES NFR BLD AUTO: 0.5 %
LYMPHOCYTES # BLD AUTO: 2.6 K/UL
LYMPHOCYTES NFR BLD: 30.3 %
MCH RBC QN AUTO: 32.6 PG
MCHC RBC AUTO-ENTMCNC: 33.6 G/DL
MCV RBC AUTO: 97 FL
MONOCYTES # BLD AUTO: 0.6 K/UL
MONOCYTES NFR BLD: 6.4 %
NEUTROPHILS # BLD AUTO: 5.3 K/UL
NEUTROPHILS NFR BLD: 61.3 %
NRBC BLD-RTO: 0 /100 WBC
PLATELET # BLD AUTO: 149 K/UL
PMV BLD AUTO: 12.8 FL
RBC # BLD AUTO: 4.42 M/UL
WBC # BLD AUTO: 8.7 K/UL

## 2018-05-22 PROCEDURE — 36415 COLL VENOUS BLD VENIPUNCTURE: CPT | Mod: PO

## 2018-05-22 PROCEDURE — 85025 COMPLETE CBC W/AUTO DIFF WBC: CPT

## 2018-05-22 RX ORDER — METFORMIN HYDROCHLORIDE 850 MG/1
TABLET ORAL
COMMUNITY
Start: 2018-04-06 | End: 2018-05-31

## 2018-05-22 NOTE — TELEPHONE ENCOUNTER
----- Message from Yenni Adams sent at 5/22/2018 10:53 AM CDT -----  Contact: bin Escamilla [phone]  Patient's mother is requesting to speak with you regarding an upcoming appt. Please advise.

## 2018-05-22 NOTE — TELEPHONE ENCOUNTER
----- Message from Jordin Fink sent at 5/22/2018  9:15 AM CDT -----  Contact: [phone] or [phone] self  Patient requested to speak with the nurse because Dr Pate advised he has not received the referral (Fax [phone]). Please call and advise.

## 2018-05-22 NOTE — TELEPHONE ENCOUNTER
Notified Francine do not have an involvement of care, please have pt to fill out one at next appt, she verbalized understanding.

## 2018-05-24 ENCOUNTER — TELEPHONE (OUTPATIENT)
Dept: FAMILY MEDICINE | Facility: CLINIC | Age: 53
End: 2018-05-24

## 2018-05-24 NOTE — TELEPHONE ENCOUNTER
Informed pt will need a signed consent from the new dr. Office faxed to Health Information (Medical records) to [phone]

## 2018-05-24 NOTE — TELEPHONE ENCOUNTER
----- Message from Liat Burgos sent at 5/24/2018  3:04 PM CDT -----  Contact: [phone]/self  Patient is requesting the last 3 office visit notes be sent to Dr. Randolph Pate  [phone] fax. Please call Mike to confirm it has been sent. Please advise.

## 2018-05-25 ENCOUNTER — TELEPHONE (OUTPATIENT)
Dept: INTERNAL MEDICINE | Facility: CLINIC | Age: 53
End: 2018-05-25

## 2018-05-25 NOTE — TELEPHONE ENCOUNTER
----- Message from Doris Smith sent at 5/25/2018  8:13 AM CDT -----  Contact: [phone]/ self   Patient requesting to speak with you regarding a referral that was faxed to Dr Randolph Pate. They received the referral but they need office notes from the last 3 visits as well. Please advise.

## 2018-05-28 ENCOUNTER — PATIENT MESSAGE (OUTPATIENT)
Dept: PSYCHIATRY | Facility: CLINIC | Age: 53
End: 2018-05-28

## 2018-05-28 RX ORDER — FLUOXETINE HYDROCHLORIDE 20 MG/1
20 CAPSULE ORAL EVERY MORNING
Qty: 90 CAPSULE | Refills: 0 | Status: SHIPPED | OUTPATIENT
Start: 2018-05-28 | End: 2018-06-11 | Stop reason: SDUPTHER

## 2018-05-28 RX ORDER — DIVALPROEX SODIUM 500 MG/1
TABLET, DELAYED RELEASE ORAL
Qty: 450 TABLET | Refills: 0 | Status: SHIPPED | OUTPATIENT
Start: 2018-05-28 | End: 2018-06-11 | Stop reason: SDUPTHER

## 2018-05-31 ENCOUNTER — OFFICE VISIT (OUTPATIENT)
Dept: FAMILY MEDICINE | Facility: CLINIC | Age: 53
End: 2018-05-31
Payer: MEDICARE

## 2018-05-31 VITALS
DIASTOLIC BLOOD PRESSURE: 84 MMHG | HEART RATE: 65 BPM | BODY MASS INDEX: 29.2 KG/M2 | WEIGHT: 197.19 LBS | OXYGEN SATURATION: 98 % | HEIGHT: 69 IN | SYSTOLIC BLOOD PRESSURE: 136 MMHG | TEMPERATURE: 98 F

## 2018-05-31 DIAGNOSIS — G47.33 OSA (OBSTRUCTIVE SLEEP APNEA): ICD-10-CM

## 2018-05-31 DIAGNOSIS — F31.31 BIPOLAR AFFECTIVE DISORDER, CURRENTLY DEPRESSED, MILD: ICD-10-CM

## 2018-05-31 DIAGNOSIS — E78.2 MIXED HYPERLIPIDEMIA: ICD-10-CM

## 2018-05-31 DIAGNOSIS — D69.6 THROMBOCYTOPENIA: ICD-10-CM

## 2018-05-31 DIAGNOSIS — M54.41 CHRONIC MIDLINE LOW BACK PAIN WITH RIGHT-SIDED SCIATICA: ICD-10-CM

## 2018-05-31 DIAGNOSIS — M51.36 DEGENERATION OF LUMBAR INTERVERTEBRAL DISC: ICD-10-CM

## 2018-05-31 DIAGNOSIS — E11.42 TYPE 2 DIABETES MELLITUS WITH DIABETIC POLYNEUROPATHY, WITHOUT LONG-TERM CURRENT USE OF INSULIN: ICD-10-CM

## 2018-05-31 DIAGNOSIS — I10 ESSENTIAL HYPERTENSION: Primary | ICD-10-CM

## 2018-05-31 DIAGNOSIS — G89.29 CHRONIC MIDLINE LOW BACK PAIN WITH RIGHT-SIDED SCIATICA: ICD-10-CM

## 2018-05-31 PROCEDURE — 3075F SYST BP GE 130 - 139MM HG: CPT | Mod: CPTII,S$GLB,, | Performed by: INTERNAL MEDICINE

## 2018-05-31 PROCEDURE — 3008F BODY MASS INDEX DOCD: CPT | Mod: CPTII,S$GLB,, | Performed by: INTERNAL MEDICINE

## 2018-05-31 PROCEDURE — 99999 PR PBB SHADOW E&M-EST. PATIENT-LVL IV: CPT | Mod: PBBFAC,,, | Performed by: INTERNAL MEDICINE

## 2018-05-31 PROCEDURE — 99215 OFFICE O/P EST HI 40 MIN: CPT | Mod: S$GLB,,, | Performed by: INTERNAL MEDICINE

## 2018-05-31 PROCEDURE — 3044F HG A1C LEVEL LT 7.0%: CPT | Mod: CPTII,S$GLB,, | Performed by: INTERNAL MEDICINE

## 2018-05-31 PROCEDURE — 99499 UNLISTED E&M SERVICE: CPT | Mod: S$PBB,,, | Performed by: INTERNAL MEDICINE

## 2018-05-31 PROCEDURE — 3079F DIAST BP 80-89 MM HG: CPT | Mod: CPTII,S$GLB,, | Performed by: INTERNAL MEDICINE

## 2018-05-31 RX ORDER — HYDROCODONE BITARTRATE AND ACETAMINOPHEN 10; 325 MG/1; MG/1
1 TABLET ORAL EVERY 6 HOURS PRN
Qty: 60 TABLET | Refills: 0 | Status: SHIPPED | OUTPATIENT
Start: 2018-05-31 | End: 2018-06-20 | Stop reason: SDUPTHER

## 2018-05-31 NOTE — PATIENT INSTRUCTIONS
Essential hypertension. Maintain < 2 Gm Na a day diet, and monitor BP at home; keep a log. Cont present tx.    ELIECER (obstructive sleep apnea); uses nightly.    Mixed hyperlipidemia; Maintain low fat high fiber diet, exercise regularly. Stop fenofibrate; add fish oil 1200 mg 2 a day.    Type 2 diabetes mellitus with diabetic polyneuropathy, without long-term current use of insulin. Maintain a low carb diet; monitor CBG's at home; keep a log for review.      Off metformin for 4-5 mos.     Bipolar affective disorder, currently depressed, mild; controlled at present; sees psych Dr Dang. Keep f/u's w him    Chronic midline low back pain with right-sided sciatica; to see chronic pain MD in [facility] in 2 weeks; norco filled #60 NR; to try to reduce it's use.  -     HYDROcodone-acetaminophen (NORCO)  mg per tablet; Take 1 tablet by mouth every 6 (six) hours as needed.  Dispense: 60 tablet; Refill: 0    Degeneration of lumbar intervertebral disc  -     HYDROcodone-acetaminophen (NORCO)  mg per tablet; Take 1 tablet by mouth every 6 (six) hours as needed.  Dispense: 60 tablet; Refill: 0    Thrombocytopenia; will monitor; suspect due to medications.

## 2018-05-31 NOTE — PROGRESS NOTES
Subjective:       Patient ID: Mike Barcenas Jr. is a 52 y.o. male.    Chief Complaint: Establish Care (moved from the [facility] )    HPI  Patient here today as he is changing PCP at [facility] to get established with me as his new PCP at [facility]; last family practice note by Dr. Parker in [place], [place] on 3/1/18 reviewed; Dr. Parker has sadly passed away of note..  Past medical history as well as surgical medical history are delineated in noted.  Social medical history and family medical history were also reviewed and noted.  Review systems obtained at length prior to physical exam been performed.  Old labs reviewed as well.  Patient with diabetes mellitus type 2 needs to resume home monitoring of his CBGs 3-5 times per week.  He's been off his metformin for 4-5 months now.  Overweight with BMI 29.12.  Can benefit from regular exercise as tolerated with caloric restriction and weight reduction to help his lower back pain, diabetes, and hypercholesterolemia, as well as hypertension.  Hypertension; he knows to watch his salt intake; stressed importance of monitoring his blood pressure at home and keeping a log.  Here manually BP is 136/84.  Hypercholesterolemia.  Needs to be on a low-fat high-fiber diet.  We'll add fish oil 1200 mg 2 per day in order to improve his lipid levels.  Stop fenofibrate.  Continue atorvastatin at 40 mg daily.    Patient has a psychiatrist Dr.Trent Dnag.who is apparently working with patient, trying to wean off his Valium; currently trying to get down to 5 mg per day then as needed 5 mg twice a day.  He has an appointment coming up to see him shortly.  She was chronic lower back pain and degenerative lumbar intervertebral disc.  Also with chronic opiate use Norco 10/325 every 6 hours as needed for pain.  He's been on opiate pain meds for years and this is reportedly increased his quality of life.  He has had injections in the past as well as physical therapy.  He has  appointment coming up with [facility] 6/15/18.  Patient has been advised that I will not be chronically refilling his opiates.  He's also been advised that  he'll be needing to get his Norco from a chronic pain management physician, as well as any other medications needed for pain management.     Total time 9:40 AM to 10:35 AM; greater than 50% of the time spent on discussion, counseling, and review; various diagnosis's with plan of care were discussed.  Labs were ordered for follow-up.  Medications were addressed.  Consultation pending with [facility] clinic in [place] on 6/15/18.  He's been advised to keep his follow-ups with his psychiatrist as well Dr. Bebeto Dang.  Stents of time spent reviewing old records.  Note with Dr. Parker his prior family practice physician from 3/1/18 reviewed as well from Leann Childress.    Review of Systems   Constitutional: Positive for activity change. Negative for fever and unexpected weight change.        Has lost 23 lbs since 1/15//18 on Weight watcher's.    HENT: Negative for congestion, postnasal drip and rhinorrhea.    Eyes: Negative for discharge.   Respiratory: Negative for cough, shortness of breath and wheezing.    Cardiovascular: Positive for palpitations. Negative for chest pain and leg swelling.        Hx of panic attacks; controlled w psychotherapist and counseling.   Gastrointestinal: Positive for diarrhea and vomiting. Negative for constipation.        Rare occasions. More stress w recent MD's passing.   Endocrine: Negative for polydipsia, polyphagia and polyuria.   Genitourinary: Negative for difficulty urinating and hematuria.   Musculoskeletal: Negative for myalgias.        Chr lower back pain.   Skin: Negative for rash.   Allergic/Immunologic: Negative for environmental allergies and food allergies.   Neurological: Negative for tremors, syncope and headaches.   Hematological: Negative for adenopathy. Does not bruise/bleed easily.  "  Psychiatric/Behavioral: Positive for confusion and dysphoric mood.        Bipolar w mostly depression.       Objective:      Vitals:    05/31/18 0917   BP: 136/84   BP Location: Left arm   Patient Position: Sitting   BP Method: Medium (Manual)   Pulse: 65   Temp: 97.7 °F (36.5 °C)   TempSrc: Oral   SpO2: 98%   Weight: 89.4 kg (197 lb 3.2 oz)   Height: 5' 9" (1.753 m)     Body mass index is 29.12 kg/m².    Physical Exam   Constitutional: He is oriented to person, place, and time. He appears well-developed and well-nourished.   HENT:   Head: Normocephalic and atraumatic.   Eyes: EOM are normal.   Neck: Normal range of motion. Neck supple. No thyromegaly present.   No carotid bruits heard   Cardiovascular: Normal rate, regular rhythm and normal heart sounds.  Exam reveals no gallop.    No murmur heard.  Pulmonary/Chest: Effort normal and breath sounds normal. No respiratory distress. He has no wheezes. He has no rales.   Abdominal: Soft. Bowel sounds are normal. He exhibits no distension. There is no tenderness. There is no rebound and no guarding.   Musculoskeletal: Normal range of motion. He exhibits no edema.   Mid-L sp tenderness to palp down.   Lymphadenopathy:     He has no cervical adenopathy.   Neurological: He is alert and oriented to person, place, and time.   Moves all 4 extremities fine.   Skin: No rash noted.   Psychiatric: He has a normal mood and affect. His behavior is normal. Thought content normal.   Vitals reviewed.      Assessment:       1. Essential hypertension    2. ELIECER (obstructive sleep apnea)    3. Mixed hyperlipidemia    4. Type 2 diabetes mellitus with diabetic polyneuropathy, without long-term current use of insulin    5. Bipolar affective disorder, currently depressed, mild    6. Chronic midline low back pain with right-sided sciatica    7. Degeneration of lumbar intervertebral disc    8. Thrombocytopenia        Plan:       Essential hypertension. Maintain < 2 Gm Na a day diet, and " monitor BP at home; keep a log. Cont present tx.; exercise as tolerated and weight reduction    ELIECER (obstructive sleep apnea); uses nightly.  Will help his blood pressure.    Mixed hyperlipidemia; Maintain low fat high fiber diet, exercise regularly. Stop fenofibrate; add fish oil 1200 mg 2 a day.  On atorvastatin    Type 2 diabetes mellitus with diabetic polyneuropathy, without long-term current use of insulin. Maintain a low carb diet; monitor CBG's at home; keep a log for review.      Off metformin for 4-5 mos.; labs pending    Bipolar affective disorder, currently depressed, mild; controlled at present; sees psych Dr Dang. Keep f/u's w him; on generic Seroquel, and fluoxetine, as well as Depakote and Valium    Chronic midline low back pain with right-sided sciatica; to see chronic pain MD in [facility] in 2 weeks; norco filled #60 NR; to try to reduce it's use.  Patient with chronic opiate use for years for pain management; will need to get pain management meds from chronic pain management physician.  Assessed with patient at length.  He understands that I will not be chronically refilling his narcotic.   -     HYDROcodone-acetaminophen (NORCO)  mg per tablet; Take 1 tablet by mouth every 6 (six) hours as needed.  Dispense: 60 tablet; Refill: 0    Degeneration of lumbar intervertebral disc  -     HYDROcodone-acetaminophen (NORCO)  mg per tablet; Take 1 tablet by mouth every 6 (six) hours as needed.  Dispense: 60 tablet; Refill: 0    Thrombocytopenia; will monitor; suspect due to medications.

## 2018-06-07 ENCOUNTER — PATIENT MESSAGE (OUTPATIENT)
Dept: FAMILY MEDICINE | Facility: CLINIC | Age: 53
End: 2018-06-07

## 2018-06-07 RX ORDER — LISINOPRIL 10 MG/1
TABLET ORAL
Qty: 45 TABLET | Refills: 0 | Status: SHIPPED | OUTPATIENT
Start: 2018-06-07 | End: 2018-08-03 | Stop reason: SDUPTHER

## 2018-06-11 ENCOUNTER — OFFICE VISIT (OUTPATIENT)
Dept: PSYCHIATRY | Facility: CLINIC | Age: 53
End: 2018-06-11
Payer: MEDICARE

## 2018-06-11 VITALS
WEIGHT: 200.38 LBS | SYSTOLIC BLOOD PRESSURE: 140 MMHG | HEART RATE: 64 BPM | BODY MASS INDEX: 29.59 KG/M2 | DIASTOLIC BLOOD PRESSURE: 72 MMHG

## 2018-06-11 DIAGNOSIS — F41.1 ANXIETY STATE: ICD-10-CM

## 2018-06-11 DIAGNOSIS — Z79.899 ENCOUNTER FOR LONG-TERM (CURRENT) USE OF MEDICATIONS: ICD-10-CM

## 2018-06-11 DIAGNOSIS — F31.31 BIPOLAR 1 DISORDER, DEPRESSED, MILD: Primary | ICD-10-CM

## 2018-06-11 PROCEDURE — 90833 PSYTX W PT W E/M 30 MIN: CPT | Mod: S$GLB,,, | Performed by: PSYCHIATRY & NEUROLOGY

## 2018-06-11 PROCEDURE — 99999 PR PBB SHADOW E&M-EST. PATIENT-LVL II: CPT | Mod: PBBFAC,,, | Performed by: PSYCHIATRY & NEUROLOGY

## 2018-06-11 PROCEDURE — 3008F BODY MASS INDEX DOCD: CPT | Mod: CPTII,S$GLB,, | Performed by: PSYCHIATRY & NEUROLOGY

## 2018-06-11 PROCEDURE — 99213 OFFICE O/P EST LOW 20 MIN: CPT | Mod: S$GLB,,, | Performed by: PSYCHIATRY & NEUROLOGY

## 2018-06-11 PROCEDURE — 3077F SYST BP >= 140 MM HG: CPT | Mod: CPTII,S$GLB,, | Performed by: PSYCHIATRY & NEUROLOGY

## 2018-06-11 PROCEDURE — 3078F DIAST BP <80 MM HG: CPT | Mod: CPTII,S$GLB,, | Performed by: PSYCHIATRY & NEUROLOGY

## 2018-06-11 PROCEDURE — 99499 UNLISTED E&M SERVICE: CPT | Mod: S$PBB,,, | Performed by: PSYCHIATRY & NEUROLOGY

## 2018-06-11 RX ORDER — DIAZEPAM 5 MG/1
5 TABLET ORAL 2 TIMES DAILY PRN
Qty: 180 TABLET | Refills: 0 | Status: SHIPPED | OUTPATIENT
Start: 2018-06-11 | End: 2018-09-24 | Stop reason: SDUPTHER

## 2018-06-11 RX ORDER — QUETIAPINE FUMARATE 300 MG/1
300 TABLET, FILM COATED ORAL NIGHTLY
Qty: 90 TABLET | Refills: 0 | Status: SHIPPED | OUTPATIENT
Start: 2018-06-11 | End: 2018-09-24 | Stop reason: SDUPTHER

## 2018-06-11 RX ORDER — DIVALPROEX SODIUM 500 MG/1
TABLET, DELAYED RELEASE ORAL
Qty: 450 TABLET | Refills: 0 | Status: SHIPPED | OUTPATIENT
Start: 2018-06-11 | End: 2018-09-24 | Stop reason: SDUPTHER

## 2018-06-11 RX ORDER — FLUOXETINE HYDROCHLORIDE 20 MG/1
20 CAPSULE ORAL EVERY MORNING
Qty: 90 CAPSULE | Refills: 0 | Status: SHIPPED | OUTPATIENT
Start: 2018-06-11 | End: 2018-09-24 | Stop reason: SDUPTHER

## 2018-06-12 ENCOUNTER — TELEPHONE (OUTPATIENT)
Dept: PAIN MEDICINE | Facility: CLINIC | Age: 53
End: 2018-06-12

## 2018-06-12 NOTE — PROGRESS NOTES
[facility] New Patient Evaluation    Referred by: Becky Nassar MD  Reason for referral:   M51.36 (ICD-10-CM) - Degeneration of lumbar intervertebral disc   G62.9 (ICD-10-CM) - Polyneuropathy       CC: low back pain    Last 3 PDI Scores 6/15/2018   Pain Disability Index (PDI) 54       HPI: Mike Barcenas Jr. is a 52 y.o. male referred for medical management of chronic pain. He has chronic pain in his lower back into right groin.  He is a former patient of Dr. Parker who prescribed Norco  q6 prn #120/month.  He has Bipolar DO Type 1 and sees a psychiatry and a therapist for management.  He states that he is very interested in eliminating his use of opioids but is fearful of withdrawals.    Location: low back  Severity: Currently: 7/10   Typical Range: 8/10     Exacerbation: 9/10   Onset: 10-12 years ago  Quality: Aching, Sharp and Shooting  Radiation: left groin  Axial/Extremity Percentage of Pain: 100/0  Exacerbating Factors: standing for more than 8 minutes  Mitigating Factors: medications  Assoc: denies night fever/night sweats, urinary incontinence, bowel incontinence, significant weight loss, significant motor weakness and loss of sensations    Previous Therapies:  PT: none  HEP: yes  TENS:  Injections: > 10 years ago, SIA's with no relief  Surgery:  None  Medications:   - NSAIDS:   - MSK Relaxants:   - TCAs:   - SNRIs:   - Topicals:   - Anticonvulsants:  - Opioids:     Current Pain Medications:  1. Norco 10-325mg q6h but has weaned himself to TID    Full Medication List:    Current Outpatient Prescriptions:     amlodipine (NORVASC) 10 MG tablet, TAKE 1 TABLET ONCE DAILY., Disp: 90 tablet, Rfl: 3    ascorbic acid (VITAMIN C) 500 MG tablet, Take 500 mg by mouth once daily., Disp: , Rfl:     atorvastatin (LIPITOR) 40 MG tablet, Take 1 tablet (40 mg total) by mouth once daily., Disp: 90 tablet, Rfl: 3    CALCIUM 600 WITH VITAMIN D3 600 mg(1,500mg) -200 unit Tab, , Disp: , Rfl:      calcium carbonate-vitamin D3 500mg (1,250mg) -600 unit Tab, once daily. , Disp: , Rfl:     diazePAM (VALIUM) 5 MG tablet, Take 1 tablet (5 mg total) by mouth 2 (two) times daily as needed for Anxiety., Disp: 180 tablet, Rfl: 0    divalproex (DEPAKOTE) 500 MG TbEC, TAKE 2 TABLETS EVERY MORNING AND TAKE 3 TABLETS AT BEDTIME, Disp: 450 tablet, Rfl: 0    FLUoxetine (PROZAC) 20 MG capsule, Take 1 capsule (20 mg total) by mouth every morning., Disp: 90 capsule, Rfl: 0    folic acid (FOLVITE) 800 MCG Tab, Take 800 mcg by mouth once daily. , Disp: , Rfl:     HYDROcodone-acetaminophen (NORCO)  mg per tablet, Take 1 tablet by mouth every 6 (six) hours as needed., Disp: 60 tablet, Rfl: 0    isosorbide mononitrate (IMDUR) 30 MG 24 hr tablet, Take 2 tablets (60 mg total) by mouth once daily., Disp: 180 tablet, Rfl: 3    lisinopril 10 MG tablet, TAKE 1/2 TABLET BY MOUTH DAILY FOR BLOOD PRESSURE., Disp: 45 tablet, Rfl: 0    multivitamin (THERAGRAN) per tablet, , Disp: , Rfl:     QUEtiapine (SEROQUEL) 300 MG Tab, Take 1 tablet (300 mg total) by mouth every evening., Disp: 90 tablet, Rfl: 0    VITAMIN B-12 50 mcg Lozg, Take 1 tablet by mouth once daily. , Disp: , Rfl:     vitamin E 400 UNIT capsule, Take 400 Units by mouth once daily. , Disp: , Rfl:     scopolamine (TRANSDERM-SCOP) 1.3-1.5 mg (1 mg over 3 days), Place 1 patch onto the skin every 72 hours., Disp: 4 patch, Rfl: 0     Review of Systems:  Review of Systems   Constitutional: Negative for chills and fever.   HENT: Negative for nosebleeds.    Eyes: Negative for pain.   Respiratory: Negative for hemoptysis.    Cardiovascular: Negative for chest pain.   Gastrointestinal: Negative for nausea and vomiting.   Genitourinary: Negative for dysuria.   Musculoskeletal: Positive for back pain and neck pain.   Skin: Negative for rash.   Neurological: Negative for sensory change and focal weakness.   Endo/Heme/Allergies: Does not bruise/bleed easily.    Psychiatric/Behavioral: Positive for depression. The patient is nervous/anxious and has insomnia.        Allergies:  Ciprofloxacin     Medical History:  Past Medical History:   Diagnosis Date    Bipolar 1 disorder     Brain aneurysm 2015    CAD (coronary artery disease)     Patient denies    Diabetes mellitus     History of psychiatric hospitalization     rehab at [facility]    Hyperlipidemia     Hypertension     Lumbar disc disease     Sleep apnea     doesn't use CPAP        Surgical History:  Past Surgical History:   Procedure Laterality Date    analplasty      COLONOSCOPY N/A 2/21/2017    Procedure: COLONOSCOPY;  Surgeon: All Blankenship Jr., MD;  Location: [facility];  Service: Endoscopy;  Laterality: N/A;    HEMORRHOID SURGERY      KNEE SURGERY Right     TONSILLECTOMY          Social History:  Social History     Social History    Marital status:      Spouse name: N/A    Number of children: N/A    Years of education: N/A     Occupational History    Not on file.     Social History Main Topics    Smoking status: Current Every Day Smoker     Packs/day: 1.00     Types: Cigarettes    Smokeless tobacco: Never Used    Alcohol use Yes      Comment: 3-4 beers every other week    Drug use: No    Sexual activity: Yes     Partners: Female     Other Topics Concern    Not on file     Social History Narrative    The patient lives in [place] with his father. The patient is exercising on a treadmill. He goes to [employer]. The patient is a smoker about a  pack a day. He no longer smokes in his home. Patient is no longer drinking alcohol. The patient does not use illicit drugs. He is not currently working but is staying active growing a garden. He was approved for social security disability. He completed his day program. He is not riding his motorcycle currently. He  his house and move in with his father in [place]. He is raising chickens. He is getting acclimated and active in his  community. 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        Physical Exam:  Vitals:    06/15/18 1322   BP: 110/72   Pulse: 76   Weight: 90.7 kg (200 lb)   PainSc:   7     General    Nursing note and vitals reviewed.  Constitutional: He is oriented to person, place, and time. He appears well-developed and well-nourished. No distress.   HENT:   Head: Normocephalic and atraumatic.   Nose: Nose normal.   Eyes: Conjunctivae and EOM are normal. Pupils are equal, round, and reactive to light. Right eye exhibits no discharge. Left eye exhibits no discharge. No scleral icterus.   Neck: No JVD present.   Cardiovascular: Intact distal pulses.    Pulmonary/Chest: Effort normal. No respiratory distress.   Abdominal: He exhibits no distension.   Neurological: He is alert and oriented to person, place, and time. Coordination normal.   Psychiatric: He has a normal mood and affect. His behavior is normal. Judgment and thought content normal.     General Musculoskeletal Exam   Gait: normal     Back (L-Spine & T-Spine) / Neck (C-Spine) Exam     Tenderness Right paramedian tenderness of the Lower L-Spine. Left paramedian tenderness of the Lower L-Spine.     Back (L-Spine & T-Spine) Range of Motion   Extension: abnormal   Flexion: abnormal     Spinal Sensation   Right Side Sensation  L-Spine Level: normal  Left Side Sensation  L-Spine Level: normal    Other He has no scoliosis .      Muscle Strength   Right Lower Extremity   Hip Flexion: 5/5   Hip Extensors: 5/5  Quadriceps:  5/5   Hamstrin/5   Gastrocsoleus:  5/5/5  Left Lower Extremity   Hip Flexion: 5/5   Hip Extensors: 5/5  Quadriceps:  5/5   Hamstrin/5   Gastrocsoleus:  5/5/5    Reflexes     Left Side  Quadriceps:  2+  Achilles:  2+    Right Side   Quadriceps:  2+  Achilles:  2+      Imaging:  3/8/10 - MRI - Lumbar Spine   (see media)      Labs:  BMP  Lab Results   Component Value Date     2018     2018    K 5.4 (H) 2018    K  5.0 02/14/2018     02/14/2018     02/14/2018    CO2 27 02/14/2018    CO2 24 02/14/2018    BUN 25 (H) 02/14/2018    BUN 24 (H) 02/14/2018    CREATININE 0.8 02/14/2018    CREATININE 0.7 02/14/2018    CALCIUM 9.9 02/14/2018    CALCIUM 9.7 02/14/2018    ANIONGAP 6 (L) 02/14/2018    ANIONGAP 8 02/14/2018    ESTGFRAFRICA >60.0 02/14/2018    ESTGFRAFRICA >60.0 02/14/2018    EGFRNONAA >60.0 02/14/2018    EGFRNONAA >60.0 02/14/2018     Lab Results   Component Value Date    ALT 9 (L) 02/14/2018    ALT 10 02/14/2018    AST 28 02/14/2018    AST 29 02/14/2018    ALKPHOS 60 02/14/2018    ALKPHOS 63 02/14/2018    BILITOT 0.4 02/14/2018    BILITOT 0.5 02/14/2018       Assessment:  Problem List Items Addressed This Visit     Chronic, continuous use of opioids    Chronic pain syndrome    Lumbar spondylosis    Relevant Orders    X-Ray Lumbar Spine Complete 5 View    Chronic bilateral low back pain without sciatica - Primary    Relevant Orders    X-Ray Lumbar Spine Complete 5 View          52-year-old male with chronic low back pain likely due to facet arthropathy who was referred by his new primary care physician for management of his opioid therapy and for consideration of interventional therapies.  The patient has a very significant psychiatric history which includes bipolar disorder type 1.  He does appear to have appropriate support and management of his bipolar disease at this time and I have not a covered any red flag behavior during today's encounter that would immediately impact his candidacy for opioid therapy.  I informed him my typical practice to reduce dependency on opioids through physical therapy, home exercise, optimization of mental health, use of non-opioid medicines, and interventional procedures.  He appears very motivated.  I recommend starting with bilateral L3, 4, 5 medial branch block for diagnostic purposes followed by radiofrequency ablation if appropriate.    I will not refill his opioid  medication today.  He reports having 18 tablets remaining which will last him 6 days at his current usage rate of t.i.d..  I plan to refill the medication next week (06/20/2018) once the preliminary results from his urine drug screen are available to confirm abstinence from illegal substances.  I informed him that I will refill the medication to be taken t.i.d. p.r.n. for the 1st month, followed by BID for the 2nd month, and finally once daily for the 3rd month.  At that point I will consider stopping the medicine or transitioning to Norco 5-325.    Treatment Plan:   PT/OT/HEP: I plan to refer him to PT once he completes the MBB/RFA sequence  Procedures: Bilat L3,4,5 MBB followed by RFA if appropriate  Medications:    - UDS today   - Opioid Contract today   - Plan to consider Melociam vs Celebrex in the future   - Avoid SNRIs, TCAs, or Anticonvulsants (gabapentin, lyrica) for now as they may exacerbate depression.  These medications should be closely monitored by psychiatric professionals given his Hx.   - Once preliminary UDS result is available, I will refill Norco at  #90/month   - Without fail, Norco will be weaned to #60 tablets for the second month and #30 tablets for the third month.  I will consider transitioning to Norco 5-325 at that point, then OFF.  Imaging: No additional imaging recommended at this time.  Labs: Reviewed.  Medications are appropriately dosed for current hepatorenal function.    Follow Up: RTC after injections    Eric Aguilera Jr, MD  Interventional Pain Medicine / Anesthesiology    Disclaimer: This note was partly generated using dictation software which may occasionally result in transcription errors.

## 2018-06-13 NOTE — TELEPHONE ENCOUNTER
----- Message from Samantha Siddiqi MA sent at 6/12/2018  1:48 PM CDT -----  Pt has failed other conservative measures of treatment and wants to continue narcotic medications.  How do you want to proceed?

## 2018-06-13 NOTE — TELEPHONE ENCOUNTER
"Spoke with patient regarding opioid medication and chart review done by Dr. Aguilera.  Pt said he is very ready to wean off the opioids and is very grateful that he received my phone call today.  His ultimate goal is to get off all this 'crap" and have a better quality of life.  Pt stated that I have no idea just how happy this phone call has made him today.  I advised pt to keep the Friday 6/15/18 appt at 130p for the consultation with Dr. Aguilera.  Pt verbalized understanding.   "

## 2018-06-13 NOTE — TELEPHONE ENCOUNTER
Based on chart review, he is poor candidate for chronic opioid therapy (Hx of THC abuse, poor compliance with DM medications, significant mental health diagnoses, and lack of appropriate opioid monitoring by his previous provider).  I would work to wean his medications to OFF over the next 6 months.    We will educate the patient that my typical practice to reduce and wean opioid dependence through physical therapy, non-opioid medications, interventional procedures, and optimization of mental health.  Should he decline,  we can provide him with a list of community pain providers whose practice may focus on monthly refills of medications.      Eric Aguilera Jr, MD  Interventional Pain Medicine / Anesthesiology

## 2018-06-15 ENCOUNTER — TELEPHONE (OUTPATIENT)
Dept: PAIN MEDICINE | Facility: CLINIC | Age: 53
End: 2018-06-15

## 2018-06-15 ENCOUNTER — HOSPITAL ENCOUNTER (OUTPATIENT)
Dept: RADIOLOGY | Facility: HOSPITAL | Age: 53
Discharge: HOME | End: 2018-06-15
Attending: PAIN MEDICINE
Payer: MEDICARE

## 2018-06-15 ENCOUNTER — OFFICE VISIT (OUTPATIENT)
Dept: PAIN MEDICINE | Facility: CLINIC | Age: 53
End: 2018-06-15
Payer: MEDICARE

## 2018-06-15 VITALS
DIASTOLIC BLOOD PRESSURE: 72 MMHG | BODY MASS INDEX: 29.53 KG/M2 | SYSTOLIC BLOOD PRESSURE: 110 MMHG | WEIGHT: 200 LBS | HEART RATE: 76 BPM

## 2018-06-15 DIAGNOSIS — G89.4 CHRONIC PAIN SYNDROME: ICD-10-CM

## 2018-06-15 DIAGNOSIS — G89.29 CHRONIC BILATERAL LOW BACK PAIN WITHOUT SCIATICA: ICD-10-CM

## 2018-06-15 DIAGNOSIS — F11.90 CHRONIC, CONTINUOUS USE OF OPIOIDS: ICD-10-CM

## 2018-06-15 DIAGNOSIS — Z79.891 ENCOUNTER FOR LONG-TERM USE OF OPIATE ANALGESIC: Primary | ICD-10-CM

## 2018-06-15 DIAGNOSIS — M47.816 LUMBAR SPONDYLOSIS: Primary | ICD-10-CM

## 2018-06-15 DIAGNOSIS — G89.29 CHRONIC BILATERAL LOW BACK PAIN WITHOUT SCIATICA: Primary | ICD-10-CM

## 2018-06-15 DIAGNOSIS — M47.816 LUMBAR SPONDYLOSIS: ICD-10-CM

## 2018-06-15 DIAGNOSIS — M54.50 CHRONIC BILATERAL LOW BACK PAIN WITHOUT SCIATICA: Primary | ICD-10-CM

## 2018-06-15 DIAGNOSIS — M54.50 CHRONIC BILATERAL LOW BACK PAIN WITHOUT SCIATICA: ICD-10-CM

## 2018-06-15 PROCEDURE — 72110 X-RAY EXAM L-2 SPINE 4/>VWS: CPT | Mod: 26,,, | Performed by: RADIOLOGY

## 2018-06-15 PROCEDURE — 3074F SYST BP LT 130 MM HG: CPT | Mod: CPTII,S$GLB,, | Performed by: PAIN MEDICINE

## 2018-06-15 PROCEDURE — 3078F DIAST BP <80 MM HG: CPT | Mod: CPTII,S$GLB,, | Performed by: PAIN MEDICINE

## 2018-06-15 PROCEDURE — 99499 UNLISTED E&M SERVICE: CPT | Mod: S$PBB,,, | Performed by: PAIN MEDICINE

## 2018-06-15 PROCEDURE — 3008F BODY MASS INDEX DOCD: CPT | Mod: CPTII,S$GLB,, | Performed by: PAIN MEDICINE

## 2018-06-15 PROCEDURE — 99204 OFFICE O/P NEW MOD 45 MIN: CPT | Mod: S$GLB,,, | Performed by: PAIN MEDICINE

## 2018-06-15 PROCEDURE — 99999 PR PBB SHADOW E&M-EST. PATIENT-LVL III: CPT | Mod: PBBFAC,,, | Performed by: PAIN MEDICINE

## 2018-06-15 PROCEDURE — 72110 X-RAY EXAM L-2 SPINE 4/>VWS: CPT | Mod: TC,FY

## 2018-06-15 RX ORDER — ALPRAZOLAM 0.5 MG/1
0.5 TABLET, ORALLY DISINTEGRATING ORAL ONCE AS NEEDED
Status: CANCELLED | OUTPATIENT
Start: 2018-06-15 | End: 2018-06-15

## 2018-06-15 NOTE — TELEPHONE ENCOUNTER
----- Message from Samantha Siddiqi MA sent at 6/15/2018  4:36 PM CDT -----      ----- Message -----  From: Morenita Baltazar  Sent: 6/15/2018   3:58 PM  To: [facility] Staff    Returning your call.  Please call patient at [phone].

## 2018-06-15 NOTE — LETTER
Janie 15, 2018      Becky Nassar MD  [address] [facility] [address]           [facility]  [address] [place] [address] [address]  [facility] [address]  Phone: [phone]          Patient: Mike Barcenas Jr.   MR Number: [id number]   YOB: 1965   Date of Visit: 6/15/2018       Dear Dr. Becky Nassar:    Thank you for referring Miek Barcenas to me for evaluation. Attached you will find relevant portions of my assessment and plan of care.    If you have questions, please do not hesitate to call me. I look forward to following Mike Barcenas along with you.    Sincerely,    Eric Aguilera Jr., MD    Enclosure  CC:  No Recipients    If you would like to receive this communication electronically, please contact [email or web address] or [phone] to request more information on [facility] access.    For providers and/or their staff who would like to refer a patient to [facility], please contact us through our one-stop-shop provider referral line, [facility], at [phone].    If you feel you have received this communication in error or would no longer like to receive these types of communications, please e-mail [email or web address]

## 2018-06-15 NOTE — TELEPHONE ENCOUNTER
----- Message from Samantha Siddiqi MA sent at 6/15/2018  2:03 PM CDT -----  A consent has been scanned for patient's procedure.  Please contact him to schedule on the [facility], per Dr. Aguilera.  Thanks

## 2018-06-15 NOTE — TELEPHONE ENCOUNTER
Spoke with patient. Procedure scheduled for 6/26. Pre-op instructions reviewed and sent to patient.

## 2018-06-18 ENCOUNTER — PATIENT MESSAGE (OUTPATIENT)
Dept: SURGERY | Facility: HOSPITAL | Age: 53
End: 2018-06-18

## 2018-06-19 ENCOUNTER — PATIENT MESSAGE (OUTPATIENT)
Dept: SURGERY | Facility: HOSPITAL | Age: 53
End: 2018-06-19

## 2018-06-19 ENCOUNTER — TELEPHONE (OUTPATIENT)
Dept: PAIN MEDICINE | Facility: CLINIC | Age: 53
End: 2018-06-19

## 2018-06-19 DIAGNOSIS — M51.36 DEGENERATION OF LUMBAR INTERVERTEBRAL DISC: ICD-10-CM

## 2018-06-19 DIAGNOSIS — M54.41 CHRONIC MIDLINE LOW BACK PAIN WITH RIGHT-SIDED SCIATICA: ICD-10-CM

## 2018-06-19 DIAGNOSIS — G89.29 CHRONIC MIDLINE LOW BACK PAIN WITH RIGHT-SIDED SCIATICA: ICD-10-CM

## 2018-06-19 NOTE — TELEPHONE ENCOUNTER
Spoke with patient regarding lab test. Patient stated that on the day of his visit, he had an Xray and went to have his UDS done. Patient stated that when he looked at his [facility] account it had a urinalysis and some other test. Patient was informed that the UDS was not done at that time. Patient was then informed that a message will be sent to the doctor regarding this matter. Patient verbalized understanding.

## 2018-06-19 NOTE — TELEPHONE ENCOUNTER
----- Message from Ashley Le sent at 6/19/2018 10:55 AM CDT -----  Contact: Self [phone] or [phone]  Patient is returning your call.  Please advise.

## 2018-06-19 NOTE — TELEPHONE ENCOUNTER
I see that the patient left a urine sample, however the lab used the sample for renal function studies and not the UDS as I ordered.    We will have the patient come back and give a sample within the next 24 hours.    Eric Aguilera Jr, MD  Interventional Pain Medicine / Anesthesiology

## 2018-06-20 ENCOUNTER — LAB VISIT (OUTPATIENT)
Dept: LAB | Facility: HOSPITAL | Age: 53
End: 2018-06-20
Attending: PAIN MEDICINE
Payer: MEDICARE

## 2018-06-20 DIAGNOSIS — Z79.891 ENCOUNTER FOR LONG-TERM USE OF OPIATE ANALGESIC: ICD-10-CM

## 2018-06-20 PROCEDURE — 80353 DRUG SCREENING COCAINE: CPT

## 2018-06-20 PROCEDURE — 80345 DRUG SCREENING BARBITURATES: CPT

## 2018-06-20 PROCEDURE — 80361 OPIATES 1 OR MORE: CPT

## 2018-06-20 PROCEDURE — 80349 CANNABINOIDS NATURAL: CPT

## 2018-06-20 PROCEDURE — 80359 METHYLENEDIOXYAMPHETAMINES: CPT

## 2018-06-20 PROCEDURE — 80358 DRUG SCREENING METHADONE: CPT

## 2018-06-20 PROCEDURE — 80346 BENZODIAZEPINES1-12: CPT

## 2018-06-20 PROCEDURE — 83992 ASSAY FOR PHENCYCLIDINE: CPT

## 2018-06-20 RX ORDER — HYDROCODONE BITARTRATE AND ACETAMINOPHEN 10; 325 MG/1; MG/1
1 TABLET ORAL EVERY 8 HOURS PRN
Qty: 90 TABLET | Refills: 0 | Status: SHIPPED | OUTPATIENT
Start: 2018-06-20 | End: 2018-07-20

## 2018-06-20 NOTE — TELEPHONE ENCOUNTER
Per my clinic note, I am refilling the medication at 90 tablets per month.  Subsequent refills will be at #60 and #30 over the next 2 months toward weaning OFF.

## 2018-06-21 ENCOUNTER — PATIENT MESSAGE (OUTPATIENT)
Dept: SURGERY | Facility: HOSPITAL | Age: 53
End: 2018-06-21

## 2018-06-22 LAB
AMOBARBITAL UR CFM-MCNC: NEGATIVE NG/ML
BARBITURATE CONF CHAIN OF CUSTODY: NORMAL
BUTALBITAL UR CFM-MCNC: NEGATIVE NG/ML
CODEINE UR-MCNC: 420 NG/ML
CODEINE UR-MCNC: NEGATIVE NG/ML
HYDROCODONE UR-MCNC: 1235 NG/ML
HYDROMORPHONE UR-MCNC: 65 NG/ML
MORPHINE UR-MCNC: NEGATIVE NG/ML
NALOXONE BY LS MS/MS: NEGATIVE NG/ML
NORHYDROCODONE BY LC MS/MS: 990 NG/ML
NOROXYCODONE BY LC-MS/MS: NEGATIVE NG/ML
NOROXYMORPHONE BY LC-MS/MS: NEGATIVE NG/ML
OXYCODONE UR-MCNC: NEGATIVE NG/ML
OXYCODONE UR-MCNC: NEGATIVE NG/ML
PENTOBARB UR CFM-MCNC: NEGATIVE NG/ML
PHENOBARB UR CFM-MCNC: NEGATIVE NG/ML
SECOBARBITAL UR CFM-MCNC: NEGATIVE NG/ML
THC CONFIRMATION CHAIN OF CUSTODY: NORMAL
THC UR-MCNC: 14 NG/ML
TOXICOLOGIST REVIEW: NORMAL

## 2018-06-23 LAB
BZE UR-MCNC: NEGATIVE NG/ML
COCAINE CONF CHAIN OF CUSTODY: NORMAL
COCAINE UR-MCNC: NEGATIVE NG/ML
PCP CONFIRMATION CHAIN OF CUSTODY: NORMAL
PCP UR CFM-MCNC: NEGATIVE NG/ML
TOXICOLOGIST REVIEW: NORMAL
TOXICOLOGIST REVIEW: NORMAL

## 2018-06-24 LAB
7AMINOCLONAZEPAM UR CFM-MCNC: NEGATIVE NG/ML
7AMINOFLUNITRAZEPAM UR CFM-MCNC: NEGATIVE NG/ML
A-OH ALPRAZ UR CFM-MCNC: NEGATIVE NG/ML
A-OH-TRIAZOLAM UR CFM-MCNC: NEGATIVE NG/ML
BENZODIAZEPINE CONF CHAIN OF CUSTODY: NORMAL
LORAZEPAM UR CFM-MCNC: NEGATIVE NG/ML
NORDIAZEPAM UR CFM-MCNC: 229 NG/ML
OH-ETHYLFLURAZ UR CFM-MCNC: NEGATIVE NG/ML
OXAZEPAM UR CFM-MCNC: 842 NG/ML
TEMAZEPAM UR CFM-MCNC: 438 NG/ML
TOXICOLOGIST REVIEW: NORMAL

## 2018-06-25 ENCOUNTER — PATIENT MESSAGE (OUTPATIENT)
Dept: SURGERY | Facility: HOSPITAL | Age: 53
End: 2018-06-25

## 2018-06-25 DIAGNOSIS — M51.36 DDD (DEGENERATIVE DISC DISEASE), LUMBAR: Primary | ICD-10-CM

## 2018-06-25 LAB
AMPHET UR-MCNC: NEGATIVE NG/ML
EPHEDRIN+PSEUDO UR-MCNC: NEGATIVE NG/ML
MDA UR-MCNC: NEGATIVE NG/ML
MDMA UR-MCNC: NEGATIVE NG/ML
METHAMPHET UR-MCNC: NEGATIVE NG/ML
PHENTERMINE UR-MCNC: NEGATIVE NG/ML
TOXICOLOGIST REVIEW: NORMAL

## 2018-06-26 ENCOUNTER — HOSPITAL ENCOUNTER (OUTPATIENT)
Dept: RADIOLOGY | Facility: HOSPITAL | Age: 53
Discharge: HOME | End: 2018-06-26
Attending: PAIN MEDICINE | Admitting: PAIN MEDICINE
Payer: MEDICARE

## 2018-06-26 ENCOUNTER — HOSPITAL ENCOUNTER (OUTPATIENT)
Facility: HOSPITAL | Age: 53
Discharge: HOME | End: 2018-06-26
Attending: PAIN MEDICINE | Admitting: PAIN MEDICINE
Payer: MEDICARE

## 2018-06-26 ENCOUNTER — SURGERY (OUTPATIENT)
Age: 53
End: 2018-06-26

## 2018-06-26 VITALS
RESPIRATION RATE: 16 BRPM | BODY MASS INDEX: 29.03 KG/M2 | DIASTOLIC BLOOD PRESSURE: 64 MMHG | SYSTOLIC BLOOD PRESSURE: 112 MMHG | TEMPERATURE: 98 F | WEIGHT: 196 LBS | HEIGHT: 69 IN | HEART RATE: 57 BPM | OXYGEN SATURATION: 98 %

## 2018-06-26 DIAGNOSIS — M51.36 DDD (DEGENERATIVE DISC DISEASE), LUMBAR: ICD-10-CM

## 2018-06-26 DIAGNOSIS — M47.816 LUMBAR SPONDYLOSIS: Primary | ICD-10-CM

## 2018-06-26 LAB
EDDP UR-MCNC: NEGATIVE NG/ML
METHADONE CHAIN OF CUSTODY: NORMAL
METHADONE UR-MCNC: NEGATIVE NG/ML
TOXICOLOGIST REVIEW: NORMAL

## 2018-06-26 PROCEDURE — 76000 FLUOROSCOPY <1 HR PHYS/QHP: CPT | Mod: TC,PO

## 2018-06-26 PROCEDURE — 25000003 PHARM REV CODE 250: Mod: PO | Performed by: PAIN MEDICINE

## 2018-06-26 PROCEDURE — 64494 INJ PARAVERT F JNT L/S 2 LEV: CPT | Mod: 50,,, | Performed by: PAIN MEDICINE

## 2018-06-26 PROCEDURE — 64494 INJ PARAVERT F JNT L/S 2 LEV: CPT | Mod: 50,PO | Performed by: PAIN MEDICINE

## 2018-06-26 PROCEDURE — 64493 INJ PARAVERT F JNT L/S 1 LEV: CPT | Mod: 50,PO | Performed by: PAIN MEDICINE

## 2018-06-26 PROCEDURE — 64495 INJ PARAVERT F JNT L/S 3 LEV: CPT | Mod: 50,PO | Performed by: PAIN MEDICINE

## 2018-06-26 PROCEDURE — 25500020 PHARM REV CODE 255: Mod: PO | Performed by: PAIN MEDICINE

## 2018-06-26 PROCEDURE — 64493 INJ PARAVERT F JNT L/S 1 LEV: CPT | Mod: 50,,, | Performed by: PAIN MEDICINE

## 2018-06-26 RX ORDER — BUPIVACAINE HYDROCHLORIDE 2.5 MG/ML
INJECTION, SOLUTION EPIDURAL; INFILTRATION; INTRACAUDAL
Status: DISCONTINUED | OUTPATIENT
Start: 2018-06-26 | End: 2018-06-26 | Stop reason: HOSPADM

## 2018-06-26 RX ORDER — ALPRAZOLAM 0.5 MG/1
0.5 TABLET, ORALLY DISINTEGRATING ORAL ONCE AS NEEDED
Status: COMPLETED | OUTPATIENT
Start: 2018-06-26 | End: 2018-06-26

## 2018-06-26 RX ORDER — LIDOCAINE HYDROCHLORIDE 10 MG/ML
INJECTION, SOLUTION EPIDURAL; INFILTRATION; INTRACAUDAL; PERINEURAL
Status: DISCONTINUED | OUTPATIENT
Start: 2018-06-26 | End: 2018-06-26 | Stop reason: HOSPADM

## 2018-06-26 RX ADMIN — ALPRAZOLAM 0.5 MG: 0.5 TABLET, ORALLY DISINTEGRATING ORAL at 08:06

## 2018-06-26 RX ADMIN — BUPIVACAINE HYDROCHLORIDE 6 ML: 2.5 INJECTION, SOLUTION EPIDURAL; INFILTRATION; INTRACAUDAL; PERINEURAL at 09:06

## 2018-06-26 RX ADMIN — LIDOCAINE HYDROCHLORIDE 10 ML: 10 INJECTION, SOLUTION EPIDURAL; INFILTRATION; INTRACAUDAL; PERINEURAL at 09:06

## 2018-06-26 RX ADMIN — IOHEXOL 3 ML: 300 INJECTION, SOLUTION INTRAVENOUS at 09:06

## 2018-06-26 NOTE — H&P (VIEW-ONLY)
[facility] New Patient Evaluation    Referred by: Becky Nassar MD  Reason for referral:   M51.36 (ICD-10-CM) - Degeneration of lumbar intervertebral disc   G62.9 (ICD-10-CM) - Polyneuropathy       CC: low back pain    Last 3 PDI Scores 6/15/2018   Pain Disability Index (PDI) 54       HPI: Mike Barcenas Jr. is a 52 y.o. male referred for medical management of chronic pain. He has chronic pain in his lower back into right groin.  He is a former patient of Dr. Parker who prescribed Norco  q6 prn #120/month.  He has Bipolar DO Type 1 and sees a psychiatry and a therapist for management.  He states that he is very interested in eliminating his use of opioids but is fearful of withdrawals.    Location: low back  Severity: Currently: 7/10   Typical Range: 8/10     Exacerbation: 9/10   Onset: 10-12 years ago  Quality: Aching, Sharp and Shooting  Radiation: left groin  Axial/Extremity Percentage of Pain: 100/0  Exacerbating Factors: standing for more than 8 minutes  Mitigating Factors: medications  Assoc: denies night fever/night sweats, urinary incontinence, bowel incontinence, significant weight loss, significant motor weakness and loss of sensations    Previous Therapies:  PT: none  HEP: yes  TENS:  Injections: > 10 years ago, SIA's with no relief  Surgery:  None  Medications:   - NSAIDS:   - MSK Relaxants:   - TCAs:   - SNRIs:   - Topicals:   - Anticonvulsants:  - Opioids:     Current Pain Medications:  1. Norco 10-325mg q6h but has weaned himself to TID    Full Medication List:    Current Outpatient Prescriptions:     amlodipine (NORVASC) 10 MG tablet, TAKE 1 TABLET ONCE DAILY., Disp: 90 tablet, Rfl: 3    ascorbic acid (VITAMIN C) 500 MG tablet, Take 500 mg by mouth once daily., Disp: , Rfl:     atorvastatin (LIPITOR) 40 MG tablet, Take 1 tablet (40 mg total) by mouth once daily., Disp: 90 tablet, Rfl: 3    CALCIUM 600 WITH VITAMIN D3 600 mg(1,500mg) -200 unit Tab, , Disp: , Rfl:      calcium carbonate-vitamin D3 500mg (1,250mg) -600 unit Tab, once daily. , Disp: , Rfl:     diazePAM (VALIUM) 5 MG tablet, Take 1 tablet (5 mg total) by mouth 2 (two) times daily as needed for Anxiety., Disp: 180 tablet, Rfl: 0    divalproex (DEPAKOTE) 500 MG TbEC, TAKE 2 TABLETS EVERY MORNING AND TAKE 3 TABLETS AT BEDTIME, Disp: 450 tablet, Rfl: 0    FLUoxetine (PROZAC) 20 MG capsule, Take 1 capsule (20 mg total) by mouth every morning., Disp: 90 capsule, Rfl: 0    folic acid (FOLVITE) 800 MCG Tab, Take 800 mcg by mouth once daily. , Disp: , Rfl:     HYDROcodone-acetaminophen (NORCO)  mg per tablet, Take 1 tablet by mouth every 6 (six) hours as needed., Disp: 60 tablet, Rfl: 0    isosorbide mononitrate (IMDUR) 30 MG 24 hr tablet, Take 2 tablets (60 mg total) by mouth once daily., Disp: 180 tablet, Rfl: 3    lisinopril 10 MG tablet, TAKE 1/2 TABLET BY MOUTH DAILY FOR BLOOD PRESSURE., Disp: 45 tablet, Rfl: 0    multivitamin (THERAGRAN) per tablet, , Disp: , Rfl:     QUEtiapine (SEROQUEL) 300 MG Tab, Take 1 tablet (300 mg total) by mouth every evening., Disp: 90 tablet, Rfl: 0    VITAMIN B-12 50 mcg Lozg, Take 1 tablet by mouth once daily. , Disp: , Rfl:     vitamin E 400 UNIT capsule, Take 400 Units by mouth once daily. , Disp: , Rfl:     scopolamine (TRANSDERM-SCOP) 1.3-1.5 mg (1 mg over 3 days), Place 1 patch onto the skin every 72 hours., Disp: 4 patch, Rfl: 0     Review of Systems:  Review of Systems   Constitutional: Negative for chills and fever.   HENT: Negative for nosebleeds.    Eyes: Negative for pain.   Respiratory: Negative for hemoptysis.    Cardiovascular: Negative for chest pain.   Gastrointestinal: Negative for nausea and vomiting.   Genitourinary: Negative for dysuria.   Musculoskeletal: Positive for back pain and neck pain.   Skin: Negative for rash.   Neurological: Negative for sensory change and focal weakness.   Endo/Heme/Allergies: Does not bruise/bleed easily.    Psychiatric/Behavioral: Positive for depression. The patient is nervous/anxious and has insomnia.        Allergies:  Ciprofloxacin     Medical History:  Past Medical History:   Diagnosis Date    Bipolar 1 disorder     Brain aneurysm 2015    CAD (coronary artery disease)     Patient denies    Diabetes mellitus     History of psychiatric hospitalization     [facility]    Hyperlipidemia     Hypertension     Lumbar disc disease     Sleep apnea     doesn't use CPAP        Surgical History:  Past Surgical History:   Procedure Laterality Date    analplasty      COLONOSCOPY N/A 2/21/2017    Procedure: COLONOSCOPY;  Surgeon: All Blankenship Jr., MD;  Location: [facility];  Service: Endoscopy;  Laterality: N/A;    HEMORRHOID SURGERY      KNEE SURGERY Right     TONSILLECTOMY          Social History:  Social History     Social History    Marital status:      Spouse name: N/A    Number of children: N/A    Years of education: N/A     Occupational History    Not on file.     Social History Main Topics    Smoking status: Current Every Day Smoker     Packs/day: 1.00     Types: Cigarettes    Smokeless tobacco: Never Used    Alcohol use Yes      Comment: 3-4 beers every other week    Drug use: No    Sexual activity: Yes     Partners: Female     Other Topics Concern    Not on file     Social History Narrative    The patient lives in [place] with his father. The patient is exercising on a treadmill. He goes to [employer]. The patient is a smoker about a  pack a day. He no longer smokes in his home. Patient is no longer drinking alcohol. The patient does not use illicit drugs. He is not currently working but is staying active growing a garden. He was approved for social security disability. He completed his day program. He is not riding his motorcycle currently. He  his house and move in with his father in [place]. He is raising chickens. He is getting acclimated and active in his  community. 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        Physical Exam:  Vitals:    06/15/18 1322   BP: 110/72   Pulse: 76   Weight: 90.7 kg (200 lb)   PainSc:   7     General    Nursing note and vitals reviewed.  Constitutional: He is oriented to person, place, and time. He appears well-developed and well-nourished. No distress.   HENT:   Head: Normocephalic and atraumatic.   Nose: Nose normal.   Eyes: Conjunctivae and EOM are normal. Pupils are equal, round, and reactive to light. Right eye exhibits no discharge. Left eye exhibits no discharge. No scleral icterus.   Neck: No JVD present.   Cardiovascular: Intact distal pulses.    Pulmonary/Chest: Effort normal. No respiratory distress.   Abdominal: He exhibits no distension.   Neurological: He is alert and oriented to person, place, and time. Coordination normal.   Psychiatric: He has a normal mood and affect. His behavior is normal. Judgment and thought content normal.     General Musculoskeletal Exam   Gait: normal     Back (L-Spine & T-Spine) / Neck (C-Spine) Exam     Tenderness Right paramedian tenderness of the Lower L-Spine. Left paramedian tenderness of the Lower L-Spine.     Back (L-Spine & T-Spine) Range of Motion   Extension: abnormal   Flexion: abnormal     Spinal Sensation   Right Side Sensation  L-Spine Level: normal  Left Side Sensation  L-Spine Level: normal    Other He has no scoliosis .      Muscle Strength   Right Lower Extremity   Hip Flexion: 5/5   Hip Extensors: 5/5  Quadriceps:  5/5   Hamstrin/5   Gastrocsoleus:  5/5/5  Left Lower Extremity   Hip Flexion: 5/5   Hip Extensors: 5/5  Quadriceps:  5/5   Hamstrin/5   Gastrocsoleus:  5/5/5    Reflexes     Left Side  Quadriceps:  2+  Achilles:  2+    Right Side   Quadriceps:  2+  Achilles:  2+      Imaging:  3/8/10 - MRI - Lumbar Spine   (see media)      Labs:  BMP  Lab Results   Component Value Date     2018     2018    K 5.4 (H) 2018    K  5.0 02/14/2018     02/14/2018     02/14/2018    CO2 27 02/14/2018    CO2 24 02/14/2018    BUN 25 (H) 02/14/2018    BUN 24 (H) 02/14/2018    CREATININE 0.8 02/14/2018    CREATININE 0.7 02/14/2018    CALCIUM 9.9 02/14/2018    CALCIUM 9.7 02/14/2018    ANIONGAP 6 (L) 02/14/2018    ANIONGAP 8 02/14/2018    ESTGFRAFRICA >60.0 02/14/2018    ESTGFRAFRICA >60.0 02/14/2018    EGFRNONAA >60.0 02/14/2018    EGFRNONAA >60.0 02/14/2018     Lab Results   Component Value Date    ALT 9 (L) 02/14/2018    ALT 10 02/14/2018    AST 28 02/14/2018    AST 29 02/14/2018    ALKPHOS 60 02/14/2018    ALKPHOS 63 02/14/2018    BILITOT 0.4 02/14/2018    BILITOT 0.5 02/14/2018       Assessment:  Problem List Items Addressed This Visit     Chronic, continuous use of opioids    Chronic pain syndrome    Lumbar spondylosis    Relevant Orders    X-Ray Lumbar Spine Complete 5 View    Chronic bilateral low back pain without sciatica - Primary    Relevant Orders    X-Ray Lumbar Spine Complete 5 View          52-year-old male with chronic low back pain likely due to facet arthropathy who was referred by his new primary care physician for management of his opioid therapy and for consideration of interventional therapies.  The patient has a very significant psychiatric history which includes bipolar disorder type 1.  He does appear to have appropriate support and management of his bipolar disease at this time and I have not a covered any red flag behavior during today's encounter that would immediately impact his candidacy for opioid therapy.  I informed him my typical practice to reduce dependency on opioids through physical therapy, home exercise, optimization of mental health, use of non-opioid medicines, and interventional procedures.  He appears very motivated.  I recommend starting with bilateral L3, 4, 5 medial branch block for diagnostic purposes followed by radiofrequency ablation if appropriate.    I will not refill his opioid  medication today.  He reports having 18 tablets remaining which will last him 6 days at his current usage rate of t.i.d..  I plan to refill the medication next week (06/20/2018) once the preliminary results from his urine drug screen are available to confirm abstinence from illegal substances.  I informed him that I will refill the medication to be taken t.i.d. p.r.n. for the 1st month, followed by BID for the 2nd month, and finally once daily for the 3rd month.  At that point I will consider stopping the medicine or transitioning to Norco 5-325.    Treatment Plan:   PT/OT/HEP: I plan to refer him to PT once he completes the MBB/RFA sequence  Procedures: Bilat L3,4,5 MBB followed by RFA if appropriate  Medications:    - UDS today   - Opioid Contract today   - Plan to consider Melociam vs Celebrex in the future   - Avoid SNRIs, TCAs, or Anticonvulsants (gabapentin, lyrica) for now as they may exacerbate depression.  These medications should be closely monitored by psychiatric professionals given his Hx.   - Once preliminary UDS result is available, I will refill Norco at  #90/month   - Without fail, Norco will be weaned to #60 tablets for the second month and #30 tablets for the third month.  I will consider transitioning to Norco 5-325 at that point, then OFF.  Imaging: No additional imaging recommended at this time.  Labs: Reviewed.  Medications are appropriately dosed for current hepatorenal function.    Follow Up: RTC after injections    Eric Aguilera Jr, MD  Interventional Pain Medicine / Anesthesiology    Disclaimer: This note was partly generated using dictation software which may occasionally result in transcription errors.

## 2018-06-26 NOTE — DISCHARGE INSTRUCTIONS
Diagnostic Block Pain Diary  Name:______________________________________       Directions:   At each time point listed below, please rate your pain using the scale below     Average pain before the nerve block:_____________________                      Hour after procedure                        Pain Level   1    2    3    4    8    12    16    20    24            Home care instructions  Apply ice pack to the injection site for 20 minutes periods for the first 24 hrs for soreness/discomfort at injection site DO NOT USE HEAT FOR 24 HOURS  Keep site clean and dry for 24 hours  Do not drive until tomorrow  Take care when walking after a LUMBAR LOWER BACK NERVE ROOT BLOCK BOTH SIDES  DO NORMAL ACTIVITY (BUT DON'T GET CRAZY)  TRY TO KEEP A SIMPLE LOG OF ANY PAIN RELATED ACTIVITY FOR DR. WELLER TO KNOW ABOUT  SEE LOG/RECORD PROVIDED.    Resume home medication as prescribed today  Resume Aspirin, Plavix, or Coumadin the day after the procedure unless otherwise instructed.    SEE IMMEDIATE MEDICAL HELP FOR:  Severe increase in your usual pain or appearance of new pain  Prolonged or increasing weakness or numbness in the legs or arms  Drainage, redness, active bleeding, or increased swelling at the injection site  Temperature over 100.0 degrees F.  Headache that increases when your head is upright and decreases when you lie flat    CALL 911 OR GO DIRECTLY TO EMERGENCY DEPARTMENT FOR:  Shortness of breath, chest pain, or problems breathing

## 2018-06-26 NOTE — OP NOTE
Procedure Note    Pre-operative diagnosis: Lumbar spondylosis  Post-operative diagnosis: Lumbar spondylosis  Procedure Date: 06/26/2018  Procedure: BILATERAL L3,4,5 Lumber Medial Branch Block under fluoroscopic guidance    Procedure in detail:  Informed consent obtained after explaining the procedure and potential complications including local discomfort, infection, headache, temporary or permanent weakness and/or numbness of one or both legs, temporary or permanent paraplegia, heart attack and stroke. All questions were answered.      Patient was taken to the procedure room and placed in prone position.  Time out was performed.  Patient's Lumbar area was prepped and draped in a sterile manner.  AP and oblique fluoroscopy were used to identify and maria luisa the junctions between the superior articular processes and transverse processes at the L4 and L5 vertebral levels as well as the sacral ala on the Right side.Then, under fluoroscopic guidance, a 22 G 3.5 inch spinal needle, was advanced to the targeted points corresponding with the locations of the L3,4,5 medial branch nerves.     Then, after negative aspiration for heme, 0.2 mL of contrast was administered demonstrating appropriate spread for medial branch coverage.  Next, 0.5 mL of 0.25% bupivacaine was injected at each of the above targeted points: the locations of the L3,4,5 medial branch nerves.     The procedure was repeated on the opposite side with similar findings.  Needle placement and contrast spread were consistent with successful medial branch nerve block.    Needle was removed with flush and bandaged placed over site of needle insertion.    Estimated Blood Loss: None  Specimens: None    Disposition: The patient tolerated the procedure without complaint and was transported to the recovery room in stable condition.    Follow-up: We will call the patient tomorrow to obtain pain scores.      Eric Aguilera Jr, MD  Interventional Pain Medicine /  Anesthesiology

## 2018-06-27 ENCOUNTER — TELEPHONE (OUTPATIENT)
Dept: PAIN MEDICINE | Facility: CLINIC | Age: 53
End: 2018-06-27

## 2018-06-27 DIAGNOSIS — M47.816 SPONDYLOSIS OF LUMBAR SPINE: Primary | ICD-10-CM

## 2018-06-27 RX ORDER — SODIUM CHLORIDE, SODIUM LACTATE, POTASSIUM CHLORIDE, CALCIUM CHLORIDE 600; 310; 30; 20 MG/100ML; MG/100ML; MG/100ML; MG/100ML
INJECTION, SOLUTION INTRAVENOUS CONTINUOUS
Status: CANCELLED | OUTPATIENT
Start: 2018-07-10

## 2018-06-27 NOTE — TELEPHONE ENCOUNTER
Spoke to patient about his block and he states that in the 1st hour  Following his procedure he reports 100% relief that lasted until 2:30pm. During this time he was able to stand up, walk around, and cook dinner with little to no pain. Patient reports that this morning his pain has returned to normal 8/10. He is ready to proceed with RFA at this time.

## 2018-07-03 ENCOUNTER — TELEPHONE (OUTPATIENT)
Dept: HEMATOLOGY/ONCOLOGY | Facility: CLINIC | Age: 53
End: 2018-07-03

## 2018-07-03 ENCOUNTER — TELEPHONE (OUTPATIENT)
Dept: PAIN MEDICINE | Facility: CLINIC | Age: 53
End: 2018-07-03

## 2018-07-03 NOTE — TELEPHONE ENCOUNTER
----- Message from Samantha Siddiqi MA sent at 7/3/2018  3:52 PM CDT -----  Mr. Barcenas would like his procedure to be rescheduled from 7/10/18 with Dr. Aguilera in [place].  Please contact him.  Thank you!

## 2018-07-03 NOTE — TELEPHONE ENCOUNTER
Spoke with patient regarding procedure. Patient stated that he didn't want to have his procedure on 7/10/18 and wanted to know if he can change it to 7/17/18. Patient was informed that the nurse on the [facility] will contact him. Patient verbalized understanding.

## 2018-07-03 NOTE — TELEPHONE ENCOUNTER
----- Message from Stephanie Guidry sent at 7/3/2018  3:39 PM CDT -----  Contact: [phone]/ self   Patient requesting to speak with you regarding procedure scheduled on 7/10/18. Pt stated that is the incorrect date, he won't have transportation that day. Please advise.

## 2018-07-05 ENCOUNTER — TELEPHONE (OUTPATIENT)
Dept: PAIN MEDICINE | Facility: CLINIC | Age: 53
End: 2018-07-05

## 2018-07-05 NOTE — TELEPHONE ENCOUNTER
----- Message from Alida Sanon LPN sent at 7/3/2018  3:54 PM CDT -----  Patient stated that he could not have his procedure on 7/10/18 and wanted to know if he could have it on 7/17/18. Patient would like for you to give him a call.     Thank you

## 2018-07-17 ENCOUNTER — SURGERY (OUTPATIENT)
Age: 53
End: 2018-07-17

## 2018-07-17 ENCOUNTER — HOSPITAL ENCOUNTER (OUTPATIENT)
Dept: RADIOLOGY | Facility: HOSPITAL | Age: 53
Discharge: HOME | End: 2018-07-17
Attending: PAIN MEDICINE | Admitting: PAIN MEDICINE
Payer: MEDICARE

## 2018-07-17 ENCOUNTER — HOSPITAL ENCOUNTER (OUTPATIENT)
Facility: HOSPITAL | Age: 53
Discharge: HOME | End: 2018-07-17
Attending: PAIN MEDICINE | Admitting: PAIN MEDICINE
Payer: MEDICARE

## 2018-07-17 VITALS
SYSTOLIC BLOOD PRESSURE: 108 MMHG | OXYGEN SATURATION: 96 % | DIASTOLIC BLOOD PRESSURE: 72 MMHG | TEMPERATURE: 98 F | RESPIRATION RATE: 15 BRPM | HEART RATE: 57 BPM

## 2018-07-17 DIAGNOSIS — M51.36 DDD (DEGENERATIVE DISC DISEASE), LUMBAR: ICD-10-CM

## 2018-07-17 DIAGNOSIS — M47.816 SPONDYLOSIS OF LUMBAR SPINE: ICD-10-CM

## 2018-07-17 DIAGNOSIS — M47.816 LUMBAR SPONDYLOSIS: Primary | ICD-10-CM

## 2018-07-17 PROCEDURE — 63600175 PHARM REV CODE 636 W HCPCS: Mod: PO | Performed by: PAIN MEDICINE

## 2018-07-17 PROCEDURE — 64636 DESTROY L/S FACET JNT ADDL: CPT | Mod: 50,,, | Performed by: PAIN MEDICINE

## 2018-07-17 PROCEDURE — 64635 DESTROY LUMB/SAC FACET JNT: CPT | Mod: 50,PO | Performed by: PAIN MEDICINE

## 2018-07-17 PROCEDURE — 25000003 PHARM REV CODE 250: Mod: PO | Performed by: PAIN MEDICINE

## 2018-07-17 PROCEDURE — 64635 DESTROY LUMB/SAC FACET JNT: CPT | Mod: 50,,, | Performed by: PAIN MEDICINE

## 2018-07-17 PROCEDURE — 76000 FLUOROSCOPY <1 HR PHYS/QHP: CPT | Mod: TC,PO

## 2018-07-17 PROCEDURE — 64636 DESTROY L/S FACET JNT ADDL: CPT | Mod: 50,PO | Performed by: PAIN MEDICINE

## 2018-07-17 RX ORDER — LIDOCAINE HYDROCHLORIDE 10 MG/ML
INJECTION, SOLUTION EPIDURAL; INFILTRATION; INTRACAUDAL; PERINEURAL
Status: DISCONTINUED | OUTPATIENT
Start: 2018-07-17 | End: 2018-07-17 | Stop reason: HOSPADM

## 2018-07-17 RX ORDER — BUPIVACAINE HYDROCHLORIDE 2.5 MG/ML
INJECTION, SOLUTION EPIDURAL; INFILTRATION; INTRACAUDAL
Status: DISCONTINUED | OUTPATIENT
Start: 2018-07-17 | End: 2018-07-17 | Stop reason: HOSPADM

## 2018-07-17 RX ORDER — METHYLPREDNISOLONE ACETATE 40 MG/ML
INJECTION, SUSPENSION INTRA-ARTICULAR; INTRALESIONAL; INTRAMUSCULAR; SOFT TISSUE
Status: DISCONTINUED | OUTPATIENT
Start: 2018-07-17 | End: 2018-07-17 | Stop reason: HOSPADM

## 2018-07-17 RX ORDER — SODIUM CHLORIDE, SODIUM LACTATE, POTASSIUM CHLORIDE, CALCIUM CHLORIDE 600; 310; 30; 20 MG/100ML; MG/100ML; MG/100ML; MG/100ML
INJECTION, SOLUTION INTRAVENOUS CONTINUOUS
Status: DISCONTINUED | OUTPATIENT
Start: 2018-07-17 | End: 2018-07-17 | Stop reason: HOSPADM

## 2018-07-17 RX ORDER — LIDOCAINE HYDROCHLORIDE 20 MG/ML
INJECTION, SOLUTION EPIDURAL; INFILTRATION; INTRACAUDAL; PERINEURAL
Status: DISCONTINUED | OUTPATIENT
Start: 2018-07-17 | End: 2018-07-17 | Stop reason: HOSPADM

## 2018-07-17 RX ADMIN — LIDOCAINE HYDROCHLORIDE 10 ML: 10 INJECTION, SOLUTION EPIDURAL; INFILTRATION; INTRACAUDAL; PERINEURAL at 11:07

## 2018-07-17 RX ADMIN — LIDOCAINE HYDROCHLORIDE 5 ML: 20 INJECTION, SOLUTION EPIDURAL; INFILTRATION; INTRACAUDAL; PERINEURAL at 11:07

## 2018-07-17 RX ADMIN — BUPIVACAINE HYDROCHLORIDE 2 ML: 2.5 INJECTION, SOLUTION EPIDURAL; INFILTRATION; INTRACAUDAL; PERINEURAL at 11:07

## 2018-07-17 RX ADMIN — METHYLPREDNISOLONE ACETATE 40 MG: 40 INJECTION, SUSPENSION INTRA-ARTICULAR; INTRALESIONAL; INTRAMUSCULAR; SOFT TISSUE at 11:07

## 2018-07-17 RX ADMIN — SODIUM CHLORIDE, SODIUM LACTATE, POTASSIUM CHLORIDE, AND CALCIUM CHLORIDE: .6; .31; .03; .02 INJECTION, SOLUTION INTRAVENOUS at 09:07

## 2018-07-17 NOTE — PLAN OF CARE
Vss, brittney po fluids, denies pain, ambulates easily. IV removed, catheter intact. Discharge instructions provided and states understanding. States ready to go home.  Discharged from facility with family.

## 2018-07-17 NOTE — DISCHARGE SUMMARY
[facility]  Discharge Note  Short Stay     Admit Date: 7/17/2018    Discharge Date: 7/17/2018     Attending Physician: Eric Aguilera Jr, MD    Diagnoses:  Active Hospital Problems    Diagnosis  POA    *Spondylosis of lumbar spine [M47.816]  Yes      Resolved Hospital Problems    Diagnosis Date Resolved POA   No resolved problems to display.     Discharged Condition: Good     Hospital Course: Patient was admitted for an outpatient interventional pain management procedure and tolerated the procedure well with no complications.     Final Diagnoses: Same as principal problem.     Disposition: Home or Self Care     Follow up/Patient Instructions:    Follow-up Information     Eric Aguilera Jr, MD. Go in 3 weeks.    Specialty:  Pain Medicine  Why:  Post-procedural Follow Up As Scheduled  Contact information:  [address] [facility] [place] [address]  [phone]                   Reconciled Medications:     Medication List      CONTINUE taking these medications    amLODIPine 10 MG tablet  Commonly known as:  NORVASC  TAKE 1 TABLET ONCE DAILY.     ascorbic acid (vitamin C) 500 MG tablet  Commonly known as:  VITAMIN C  Take 500 mg by mouth once daily.     atorvastatin 40 MG tablet  Commonly known as:  LIPITOR  Take 1 tablet (40 mg total) by mouth once daily.     * calcium carbonate-vitamin D3 500mg (1,250mg) -600 unit Tab  once daily.     * CALCIUM 600 WITH VITAMIN D3 600 mg(1,500mg) -200 unit Tab  Generic drug:  calcium-vitamin D3     diazePAM 5 MG tablet  Commonly known as:  VALIUM  Take 1 tablet (5 mg total) by mouth 2 (two) times daily as needed for Anxiety.     divalproex 500 MG Tbec  Commonly known as:  DEPAKOTE  TAKE 2 TABLETS EVERY MORNING AND TAKE 3 TABLETS AT BEDTIME     FLUoxetine 20 MG capsule  Commonly known as:  PROZAC  Take 1 capsule (20 mg total) by mouth every morning.     folic acid 800 MCG Tab  Commonly known as:  FOLVITE  Take 800 mcg by mouth once daily.     HYDROcodone-acetaminophen   mg per tablet  Commonly known as:  NORCO  Take 1 tablet by mouth every 8 (eight) hours as needed.     isosorbide mononitrate 30 MG 24 hr tablet  Commonly known as:  IMDUR  Take 2 tablets (60 mg total) by mouth once daily.     lisinopril 10 MG tablet  TAKE 1/2 TABLET BY MOUTH DAILY FOR BLOOD PRESSURE.     multivitamin per tablet  Commonly known as:  THERAGRAN     QUEtiapine 300 MG Tab  Commonly known as:  SEROQUEL  Take 1 tablet (300 mg total) by mouth every evening.     scopolamine 1.3-1.5 mg (1 mg over 3 days)  Commonly known as:  TRANSDERM-SCOP  Place 1 patch onto the skin every 72 hours.     VITAMIN B-12 50 mcg Lozg  Generic drug:  cyanocobalamin (vitamin B-12)  Take 1 tablet by mouth once daily.     vitamin E 400 UNIT capsule  Take 400 Units by mouth once daily.        * This list has 2 medication(s) that are the same as other medications prescribed for you. Read the directions carefully, and ask your doctor or other care provider to review them with you.                Discharge Procedure Orders (must include Diet, Follow-up, Activity)  Call MD for:  temperature >100.4     Call MD for:  severe uncontrolled pain     Call MD for:  redness, tenderness, or signs of infection (pain, swelling, redness, odor or green/yellow discharge around incision site)     Call MD for:  difficulty breathing or increased cough     Call MD for:  severe persistent headache     Call MD for:  worsening rash     Remove dressing in 24 hours

## 2018-07-17 NOTE — DISCHARGE INSTRUCTIONS
Home care instructions  Apply ice pack to the injection site for 20 minutes periods for the first 24 hrs for soreness/discomfort at injection site DO NOT USE HEAT FOR 24 HOURS  Keep site clean and dry for 24 hours, remove bandaid when desired  Do not drive until tomorrow  Take care when walking after a lumbar injection  Avoid strenuous activities for 2 days  Make take 2 weeks to feel the full effects   Resume home medication as prescribed today  Resume Aspirin, Plavix, or Coumadin the day after the procedure unless otherwise instructed.    SEE IMMEDIATE MEDICAL HELP FOR:  Severe increase in your usual pain or appearance of new pain  Prolonged or increasing weakness or numbness in the legs or arms  Drainage, redness, active bleeding, or increased swelling at the injection site  Temperature over 100.0 degrees F.  Headache that increases when your head is upright and decreases when you lie flat    CALL 911 OR GO DIRECTLY TO EMERGENCY DEPARTMENT FOR:  Shortness of breath, chest pain, or problems breathing      Recovery After Procedural Sedation (Adult)  You have been given medicine by vein to make you sleep during your surgery. This may have included both a pain medicine and sleeping medicine. Most of the effects have worn off. But you may still have some drowsiness for the next 6 to 8 hours.  Home care  Follow these guidelines when you get home:  · For the next 8 hours, you should be watched by a responsible adult. This person should make sure your condition is not getting worse.  · Don't drink any alcohol for the next 24 hours.  · Don't drive, operate dangerous machinery, or make important business or personal decisions during the next 24 hours.  Note: Your healthcare provider may tell you not to take any medicine by mouth for pain or sleep in the next 4 hours. These medicines may react with the medicines you were given in the hospital. This could cause a much stronger response than usual.  Follow-up care  Follow up  with your healthcare provider if you are not alert and back to your usual level of activity within 12 hours.  When to seek medical advice  Call your healthcare provider right away if any of these occur:  · Drowsiness gets worse  · Weakness or dizziness gets worse  · Repeated vomiting  · You can't be awakened   Date Last Reviewed: 10/18/2016  © 5534-9244 [employer]. [address] [place] [address], [place] [address]. All rights reserved. This information is not intended as a substitute for professional medical care. Always follow your healthcare professional's instructions.

## 2018-07-17 NOTE — OP NOTE
Lumbar Medial Branch Nerve Radiofrequency Ablation    Preoperative Diagnosis: Spondylosis of lumbar spine  Postoperative Diagnosis: Spondylosis of lumbar spine  Procedure Date: 07/17/2018  Procedure Title: (1) BILATERAL L3,4,5 Lumbar Medial Branch Radiofrequency Ablation and (2) Intraoperative Fluoroscopy    Anesthesia: Local    Indications: Mike Barcenas Jr. is a 52 y.o. year-old male with a diagnosis of back pain due to lumbar or lumbosacral spondylosis.  The patient had significant relief of the pain with the previous diagnostic nerve blocks.     Findings: Final needle placement consistent with technically sucsessful procedure.     Procedure in Detail: The patients history and physical exam were reviewed.  Informed consent obtained after explaining the procedure and potential complications including local discomfort, infection, headache, temporary or permanent weakness and/or numbness of one or both legs, temporary or permanent paraplegia, heart attack and stroke. The patient agreed to proceed, and written informed consent was obtained.    Patient was brought back to procedure room and placed in a prone position and head resting comfortably in head ring. Prior to the initiation of the procedure, the patient's identity, the site, and the nature of the procedure were verified.  AP and oblique fluoroscopy were used to identify and maria luisa the junctions between the superior articular processes and transverse processes at the L4 and L5 levels on the Right side.  The Right sacral ala was also identified and marked.  The skin and subcutaneous tissues in these identified areas were anesthetized with 1% lidocaine. A 18 gauge 150 mm probe radiofrequency probe was advanced towards each of these points under fluoroscopic guidance. Once bone was contacted, negative aspiration was confirmed. Sensory stimulation was performed at 50 Hz & 0.4V, generating a pressure sensation. Motor stimulation at 2 Hz & 2 V was negative for  muscle contractions in the above mentioned nerve distributions. One mL of 2% lidocaine was injected at each level prior to lesioning, which was performed for 90 seconds at 80 degrees Centigrade. Once the lesion was complete, 1 mL of a solution consisting of 5 mL 0.25% bupivacaine and 40mg depomedrol was injected through each probe. The probes were removed with a 1% lidocaine flush.      The procedure was repeated on the LEFT side at the same levels with similar stimulation findings and needle placement consistent with a successful RFA.    After the procedure was completed, the patients back was cleaned and bandages were placed at the needle insertion sites.    Estimated blood loss: None  Complications: None     Disposition:  The patient tolerated the procedure well and there were no apparent complications. Vital signs remained stable throughout the procedure. The patient was taken to the recovery area where written discharge instructions for the procedure were given.     Follow-up: RTC as scheduled    Eric Aguilera Jr, MD  Interventional Pain Medicine / Anesthesiology

## 2018-07-17 NOTE — H&P
[facility]  History & Physical - Short Stay  Pain Management           SUBJECTIVE:     Procedure: Procedure(s) (LRB):  RADIOFREQUENCY ABLATION, NERVE, MEDIAL BRANCH, LUMBAR L3, L4, L5 (Bilateral)    Chief Complaint/Reason for Admission:  Spondylosis of lumbar spine [M47.816]    PTA Medications   Medication Sig    amlodipine (NORVASC) 10 MG tablet TAKE 1 TABLET ONCE DAILY.    ascorbic acid (VITAMIN C) 500 MG tablet Take 500 mg by mouth once daily.    atorvastatin (LIPITOR) 40 MG tablet Take 1 tablet (40 mg total) by mouth once daily.    CALCIUM 600 WITH VITAMIN D3 600 mg(1,500mg) -200 unit Tab     calcium carbonate-vitamin D3 500mg (1,250mg) -600 unit Tab once daily.     diazePAM (VALIUM) 5 MG tablet Take 1 tablet (5 mg total) by mouth 2 (two) times daily as needed for Anxiety.    divalproex (DEPAKOTE) 500 MG TbEC TAKE 2 TABLETS EVERY MORNING AND TAKE 3 TABLETS AT BEDTIME    FLUoxetine (PROZAC) 20 MG capsule Take 1 capsule (20 mg total) by mouth every morning.    HYDROcodone-acetaminophen (NORCO)  mg per tablet Take 1 tablet by mouth every 8 (eight) hours as needed.    isosorbide mononitrate (IMDUR) 30 MG 24 hr tablet Take 2 tablets (60 mg total) by mouth once daily.    lisinopril 10 MG tablet TAKE 1/2 TABLET BY MOUTH DAILY FOR BLOOD PRESSURE.    multivitamin (THERAGRAN) per tablet     QUEtiapine (SEROQUEL) 300 MG Tab Take 1 tablet (300 mg total) by mouth every evening.    VITAMIN B-12 50 mcg Lozg Take 1 tablet by mouth once daily.     vitamin E 400 UNIT capsule Take 400 Units by mouth once daily.     folic acid (FOLVITE) 800 MCG Tab Take 800 mcg by mouth once daily.     scopolamine (TRANSDERM-SCOP) 1.3-1.5 mg (1 mg over 3 days) Place 1 patch onto the skin every 72 hours.       Review of patient's allergies indicates:   Allergen Reactions    Ciprofloxacin      Unknown, happened when a child       Past Medical History:   Diagnosis Date    Arthritis     Bipolar 1 disorder      Brain aneurysm 2015    CAD (coronary artery disease)     Patient denies    Diabetes mellitus     No longer takes medicine A1C at correct level    History of psychiatric hospitalization     [facility]    Hyperlipidemia     Hypertension     Lumbar disc disease     Sleep apnea     doesn't use CPAP     Past Surgical History:   Procedure Laterality Date    analplasty      COLONOSCOPY N/A 2/21/2017    Procedure: COLONOSCOPY;  Surgeon: All Blankenship Jr., MD;  Location: [facility] ENDO;  Service: Endoscopy;  Laterality: N/A;    HEMORRHOID SURGERY      INJECTION OF ANESTHETIC AGENT AROUND MEDIAL BRANCH NERVES INNERVATING LUMBAR FACET JOINT Bilateral 6/26/2018    Procedure: Block-nerve-medial branch-lumbar L3, L4, L5;  Surgeon: Eric Aguilera Jr., MD;  Location: [facility] OR;  Service: Pain Management;  Laterality: Bilateral;    KNEE SURGERY Right     TONSILLECTOMY       Family History   Problem Relation Age of Onset    Cancer Sister         lymphoma     Diabetes Sister     Bipolar disorder Maternal Uncle     Anxiety disorder Maternal Grandmother     Stroke Father     Heart disease Neg Hx      Social History   Substance Use Topics    Smoking status: Current Every Day Smoker     Packs/day: 1.00     Types: Cigarettes    Smokeless tobacco: Never Used    Alcohol use Yes      Comment: 3-4 beers every other week        Review of Systems:  Review of Systems   Constitutional: Negative for chills and fever.   HENT: Negative for nosebleeds.    Eyes: Negative for blurred vision and pain.   Respiratory: Negative for hemoptysis.    Cardiovascular: Negative for chest pain and palpitations.   Gastrointestinal: Negative for heartburn, nausea and vomiting.   Genitourinary: Negative for dysuria and hematuria.   Musculoskeletal: Positive for back pain and joint pain. Negative for myalgias.   Skin: Negative for rash.   Neurological: Negative for sensory change, focal weakness, seizures and loss of  consciousness.   Endo/Heme/Allergies: Does not bruise/bleed easily.   Psychiatric/Behavioral: Positive for depression. Negative for hallucinations. The patient is nervous/anxious.        OBJECTIVE:     Vital Signs (Most Recent):  Temp: 97.7 °F (36.5 °C) (07/17/18 0948)  Pulse: 60 (07/17/18 0948)  Resp: 18 (07/17/18 0948)  BP: 127/61 (07/17/18 0948)  SpO2: 96 % (07/17/18 0948)    Physical Exam:  General appearance - alert, well appearing, and in no distress  Mental status - AOx3  Eyes - pupils equal and reactive, extraocular eye movements intact  Heart - normal rate, regular rhythm, normal S1, S2, no murmurs, rubs, clicks or gallops  Chest - clear to auscultation, no wheezes, rales or rhonchi, symmetric air entry  Abdomen - soft, nontender, nondistended, no masses or organomegaly  Neurological - alert, oriented, normal speech, no focal findings or movement disorder noted  Extremities - peripheral pulses normal, no pedal edema, no clubbing or cyanosis  Back - Facet loading positive bilaterally        ASSESSMENT/PLAN:     Active Hospital Problems    Diagnosis  POA    Spondylosis of lumbar spine [M47.816]  Yes      Resolved Hospital Problems    Diagnosis Date Resolved POA   No resolved problems to display.        The risks and benefits of this intervention, and alternative therapies were discussed with the patient.  The discussion of risks included infection, bleeding, need for additional procedures or surgery, nerve damage, paralysis, adverse medication reaction(s), stroke, and/or death.  Questions regarding the procedure, risks, expected outcome, and possible side effects were solicited and answered to the patient's satisfaction.  Elias wishes to proceed with the injection.  Verbal and written consent were verified.      Proceed with intervention as scheduled.      Eric Aguilera Jr, MD  Interventional Pain Medicine / Anesthesiology

## 2018-07-18 ENCOUNTER — PATIENT MESSAGE (OUTPATIENT)
Dept: PAIN MEDICINE | Facility: CLINIC | Age: 53
End: 2018-07-18

## 2018-07-18 DIAGNOSIS — G89.29 CHRONIC MIDLINE LOW BACK PAIN WITH RIGHT-SIDED SCIATICA: ICD-10-CM

## 2018-07-18 DIAGNOSIS — M51.36 DEGENERATION OF LUMBAR INTERVERTEBRAL DISC: ICD-10-CM

## 2018-07-18 DIAGNOSIS — M54.41 CHRONIC MIDLINE LOW BACK PAIN WITH RIGHT-SIDED SCIATICA: ICD-10-CM

## 2018-07-18 RX ORDER — HYDROCODONE BITARTRATE AND ACETAMINOPHEN 10; 325 MG/1; MG/1
1 TABLET ORAL EVERY 8 HOURS PRN
Qty: 90 TABLET | Refills: 0 | Status: CANCELLED | OUTPATIENT
Start: 2018-07-18 | End: 2018-08-17

## 2018-07-18 NOTE — TELEPHONE ENCOUNTER
Spoke to patient and he states that he is sore and is having some discomfort. I explained that this is normal and he should expect a gradual decrease in pain over a few weeks. He verbalized understanding and will call back with any other questions.

## 2018-07-18 NOTE — TELEPHONE ENCOUNTER
----- Message from Vanita Cevallos sent at 7/18/2018 11:59 AM CDT -----  Type: Needs Medical Advice    Who Called: talisha  Symptoms (please be specific):  Na  How long has patient had these symptoms:  Maria L  Pharmacy name and phone #:  Maria L  Best Call Back Number: [phone] (home)     Additional Information: Had a procedure completed yesterday and wanted to know how long pain is expected to last

## 2018-07-18 NOTE — TELEPHONE ENCOUNTER
Patient needs to schedule a clinic visit to discuss medication management as he is no longer a candidate for chronic opioid therapy.  His UDS was positive for marijuana which is a violation of the opioid contract.  Further, it was our goal to eliminate his dependency through his of interventional procedures, which have been completed.    Eric Aguilera Jr, MD  Interventional Pain Medicine / Anesthesiology

## 2018-07-19 DIAGNOSIS — I10 ESSENTIAL HYPERTENSION: ICD-10-CM

## 2018-07-20 ENCOUNTER — PATIENT MESSAGE (OUTPATIENT)
Dept: FAMILY MEDICINE | Facility: CLINIC | Age: 53
End: 2018-07-20

## 2018-07-20 ENCOUNTER — OFFICE VISIT (OUTPATIENT)
Dept: DERMATOLOGY | Facility: CLINIC | Age: 53
End: 2018-07-20
Payer: MEDICARE

## 2018-07-20 DIAGNOSIS — L72.0 EIC (EPIDERMAL INCLUSION CYST): ICD-10-CM

## 2018-07-20 DIAGNOSIS — Z12.83 SCREENING EXAM FOR SKIN CANCER: ICD-10-CM

## 2018-07-20 DIAGNOSIS — D48.5 NEOPLASM OF UNCERTAIN BEHAVIOR OF SKIN: Primary | ICD-10-CM

## 2018-07-20 DIAGNOSIS — L57.0 AK (ACTINIC KERATOSIS): ICD-10-CM

## 2018-07-20 PROCEDURE — 11101 PR BIOPSY, EACH ADDED LESION: CPT | Mod: S$GLB,,, | Performed by: DERMATOLOGY

## 2018-07-20 PROCEDURE — 99999 PR PBB SHADOW E&M-EST. PATIENT-LVL III: CPT | Mod: PBBFAC,,, | Performed by: DERMATOLOGY

## 2018-07-20 PROCEDURE — 17000 DESTRUCT PREMALG LESION: CPT | Mod: S$GLB,,, | Performed by: DERMATOLOGY

## 2018-07-20 PROCEDURE — 88305 TISSUE EXAM BY PATHOLOGIST: CPT | Mod: 59 | Performed by: PATHOLOGY

## 2018-07-20 PROCEDURE — 99202 OFFICE O/P NEW SF 15 MIN: CPT | Mod: 25,S$GLB,, | Performed by: DERMATOLOGY

## 2018-07-20 PROCEDURE — 11100 PR BIOPSY OF SKIN LESION: CPT | Mod: 59,S$GLB,, | Performed by: DERMATOLOGY

## 2018-07-20 RX ORDER — METFORMIN HYDROCHLORIDE 850 MG/1
TABLET ORAL
COMMUNITY
Start: 2018-06-22 | End: 2018-09-24

## 2018-07-20 RX ORDER — AMLODIPINE BESYLATE 10 MG/1
10 TABLET ORAL DAILY
Qty: 90 TABLET | Refills: 1 | Status: SHIPPED | OUTPATIENT
Start: 2018-07-20 | End: 2018-08-06 | Stop reason: SDUPTHER

## 2018-07-20 NOTE — PATIENT INSTRUCTIONS
Shave Biopsy Wound Care    Your doctor has performed a shave biopsy today.  A band aid and vaseline ointment has been placed over the site.  This should remain in place for 24 hours.  It is recommended that you keep the area dry for the first 24 hours.  After 24 hours, you may remove the band aid and wash the area with warm soap and water and apply Vaseline jelly.  Many patients prefer to use Neosporin or Bacitracin ointment.  This is acceptable; however, know that you can develop an allergy to this medication even if you have used it safely for years.  It is important to keep the area moist.  Letting it dry out and get air slows healing time, and will worsen the scar.  Band aid is optional after first 24 hours.      If you notice increasing redness, tenderness, pain, or yellow drainage at the biopsy site, please notify your doctor.  These are signs of an infection.    If your biopsy site is bleeding, apply firm pressure for 15 minutes straight.  Repeat for another 15 minutes, if it is still bleeding.   If the surgical site continues to bleed, then please contact your doctor.      [facility] [place] [address]/ [phone] FAX/ [email or web address]      CRYOSURGERY      Your doctor has used a method called cryosurgery to treat your skin condition. Cryosurgery refers to the use of very cold substances to treat a variety of skin conditions such as warts, pre-skin cancers, molluscum contagiosum, sun spots, and several benign growths. The substance we use in cryosurgery is liquid nitrogen and is so cold (-195 degrees Celsius) that is burns when administered.     Following treatment in the office, the skin may immediately burn and become red. You may find the area around the lesion is affected as well. It is sometimes necessary to treat not only the lesion, but a small area of the surrounding normal skin to achieve a good response.     A blister, and even a blood filled blister, may form  after treatment.   This is a normal response. If the blister is painful, it is acceptable to sterilize a needle and with rubbing alcohol and gently pop the blister. It is important that you gently wash the area with soap and warm water as the blister fluid may contain wart virus if a wart was treated. Do no remove the roof of the blister.     The area treated can take anywhere from 1-3 weeks to heal. Healing time depends on the kind skin lesion treated, the location, and how aggressively the lesion was treated. It is recommended that the areas treated are covered with Vaseline or bacitracin ointment and a band-aid. If a band-aid is not practical, just ointment applied several times per day will do. Keeping these areas moist will speed the healing time.    Treatment with liquid nitrogen can leave a scar. In dark skin, it may be a light or dark scar, in light skin it may be a white or pink scar. These will generally fade with time, but may never go away completely.     If you have any concerns after your treatment, please feel free to call the office.       [facility] [place] [address]/ [phone] FAX/ [email or web address]

## 2018-07-20 NOTE — PROGRESS NOTES
Subjective:       Patient ID:  Mike Barcenas Jr. is a 52 y.o. male who presents for   Chief Complaint   Patient presents with    Skin Check     TBSE     Patient complains of lesion(s)  Location: left arm  Duration: 1 year  Symptoms: bleeds and scabs  Relieving factors/Previous treatments: none    Patient complains of lesion(s)  Location: left post arm  Duration: 1 year  Symptoms: raised   Relieving factors/Previous treatments: none    Patient complains of lesion(s)  Location: cyst on right back  Duration: 3 years  Symptoms: drained 2 years ago - now smaller  Relieving factors/Previous treatments: none    No h/o nmsc or mm              Review of Systems   Skin: Positive for activity-related sunscreen use (rare) and wears hat. Negative for daily sunscreen use and recent sunburn.   Hematologic/Lymphatic: Does not bruise/bleed easily.        Objective:    Physical Exam   Constitutional: He appears well-developed and well-nourished. No distress.   Neurological: He is alert and oriented to person, place, and time. He is not disoriented.   Psychiatric: He has a normal mood and affect.   Skin:   Areas Examined (abnormalities noted in diagram):   Head / Face Inspection Performed  Neck Inspection Performed  Chest / Axilla Inspection Performed  Back Inspection Performed  RUE Inspected  LUE Inspection Performed                   Diagram Legend     Erythematous scaling macule/papule c/w actinic keratosis       Vascular papule c/w angioma      Pigmented verrucoid papule/plaque c/w seborrheic keratosis      Yellow umbilicated papule c/w sebaceous hyperplasia      Irregularly shaped tan macule c/w lentigo     1-2 mm smooth white papules consistent with Milia      Movable subcutaneous cyst with punctum c/w epidermal inclusion cyst      Subcutaneous movable cyst c/w pilar cyst      Firm pink to brown papule c/w dermatofibroma      Pedunculated fleshy papule(s) c/w skin tag(s)      Evenly pigmented macule c/w junctional nevus      Mildly variegated pigmented, slightly irregular-bordered macule c/w mildly atypical nevus      Flesh colored to evenly pigmented papule c/w intradermal nevus       Pink pearly papule/plaque c/w basal cell carcinoma      Erythematous hyperkeratotic cursted plaque c/w SCC      Surgical scar with no sign of skin cancer recurrence      Open and closed comedones      Inflammatory papules and pustules      Verrucoid papule consistent consistent with wart     Erythematous eczematous patches and plaques     Dystrophic onycholytic nail with subungual debris c/w onychomycosis     Umbilicated papule    Erythematous-base heme-crusted tan verrucoid plaque consistent with inflamed seborrheic keratosis     Erythematous Silvery Scaling Plaque c/w Psoriasis     See annotation              Assessment / Plan:      Pathology Orders:     Normal Orders This Visit    Tissue Specimen To Pathology, Dermatology     Questions:    Directional Terms:  Other(comment)    Clinical information:  r/o atypical nevus    Specific Site:  left back    Tissue Specimen To Pathology, Dermatology     Questions:    Directional Terms:  Other(comment)    Clinical information:  r/o bcc vs mm    Specific Site:  left post arm        Neoplasm of uncertain behavior of skin  -     Tissue Specimen To Pathology, Dermatology  -     Tissue Specimen To Pathology, Dermatology    Shave biopsy procedure note:    Shave biopsy x 2 performed after verbal consent including risk of infection, scar, recurrence, need for additional treatment of site. Area prepped with alcohol, anesthetized with approximately 1.0cc of 1% lidocaine with epinephrine. Lesional tissue shaved with razor blade. Hemostasis achieved with application of aluminum chloride followed by hyfrecation. No complications. Dressing applied. Wound care explained.      If biopsy positive, schedule procedure for definitive excision.     AK (actinic keratosis) - HAK  Cryosurgery Procedure Note    Verbal consent from the  patient is obtained including, but not limited to, risk of hypopigmentation/hyperpigmentation, scar, recurrence of lesion. The patient is aware of the precancerous quality and need for treatment of these lesions. Liquid nitrogen cryosurgery is applied to the 1 actinic keratoses, as detailed in the physical exam, to produce a freeze injury. The patient is aware that blisters may form and is instructed on wound care with gentle cleansing and use of vaseline ointment to keep moist until healed. The patient is supplied a handout on cryosurgery and is instructed to call if lesions do not completely resolve.      EIC (epidermal inclusion cyst) - back  Reassurance given to patient. No treatment is necessary.       Screening exam for skin cancer      Upper body skin examination performed today including at least 6 points as noted in physical examination. Suspicious lesions noted.               Follow-up in about 6 months (around 1/20/2019).

## 2018-07-20 NOTE — LETTER
July 20, 2018      Kiet Parker Jr., MD  [address] [place] [address]           [facility]  [address]  [place] [address]  Phone: [phone]  Fax: [phone]          Patient: Mike Barcenas Jr.   MR Number: [id number]   YOB: 1965   Date of Visit: 7/20/2018       Dear Dr. Kiet Parker Jr.:    Thank you for referring Mike Barcenas to me for evaluation. Attached you will find relevant portions of my assessment and plan of care.    If you have questions, please do not hesitate to call me. I look forward to following Mike Barcenas along with you.    Sincerely,    Mary Kate Parrish MD    Enclosure  CC:  No Recipients    If you would like to receive this communication electronically, please contact [email or web address] or [phone] to request more information on [email or web address] Link access.    For providers and/or their staff who would like to refer a patient to [facility], please contact us through our one-stop-shop provider referral line, [facility], at [phone].    If you feel you have received this communication in error or would no longer like to receive these types of communications, please e-mail [email or web address]

## 2018-07-23 ENCOUNTER — PATIENT MESSAGE (OUTPATIENT)
Dept: PAIN MEDICINE | Facility: CLINIC | Age: 53
End: 2018-07-23

## 2018-07-23 RX ORDER — HYDROCODONE BITARTRATE AND ACETAMINOPHEN 10; 325 MG/1; MG/1
1 TABLET ORAL EVERY 8 HOURS PRN
COMMUNITY
End: 2019-01-07

## 2018-07-23 RX ORDER — HYDROCODONE BITARTRATE AND ACETAMINOPHEN 10; 325 MG/1; MG/1
1 TABLET ORAL EVERY 8 HOURS PRN
Qty: 90 TABLET | Refills: 0 | Status: CANCELLED | OUTPATIENT
Start: 2018-07-23

## 2018-07-24 ENCOUNTER — TELEPHONE (OUTPATIENT)
Dept: PAIN MEDICINE | Facility: CLINIC | Age: 53
End: 2018-07-24

## 2018-07-24 ENCOUNTER — PATIENT MESSAGE (OUTPATIENT)
Dept: ORTHOPEDICS | Facility: CLINIC | Age: 53
End: 2018-07-24

## 2018-07-24 RX ORDER — HYDROCODONE BITARTRATE AND ACETAMINOPHEN 10; 325 MG/1; MG/1
1 TABLET ORAL EVERY 8 HOURS PRN
OUTPATIENT
Start: 2018-07-24

## 2018-07-24 NOTE — TELEPHONE ENCOUNTER
Spoke with patient regarding medication. Patient was informed that his medication refill request has been sent to his pharmacy of choice. Patient was also informed that he will need to keep his appt that is scheduled on 8/8/18. Patient was encouraged to contact his pharmacy to verify if medication request was sent. Patient verbalized understanding.

## 2018-07-24 NOTE — TELEPHONE ENCOUNTER
Left message with family member to have the patient to contact the office regarding his medication. Number provided. Patient's family member verbalized understanding.

## 2018-07-24 NOTE — TELEPHONE ENCOUNTER
Patient is requesting refill of opioid therapy via phone call.  I decline to refill the medication outside of a clinic visit.  Also, he is not a candidate for continued opioid therapy from this provider as his last urine drug screen was positive for THC.    Eric Aguilera Jr, MD  Interventional Pain Medicine / Anesthesiology

## 2018-07-24 NOTE — TELEPHONE ENCOUNTER
----- Message from Yenni Adams sent at 7/24/2018  1:32 PM CDT -----  Contact: [phone]/self  Patient states he is returning your call. Please advise.

## 2018-07-25 RX ORDER — HYDROCODONE BITARTRATE AND ACETAMINOPHEN 10; 325 MG/1; MG/1
1 TABLET ORAL EVERY 8 HOURS PRN
Refills: 0 | Status: CANCELLED | OUTPATIENT
Start: 2018-07-25

## 2018-07-25 NOTE — TELEPHONE ENCOUNTER
----- Message from Criss Lind sent at 7/25/2018  8:39 AM CDT -----  Contact: [phone]/self  Patient requesting to speak with you regarding getting his medication  HYDROcodone-acetaminophen (NORCO)  mg per tablet refill. Please advise.

## 2018-07-25 NOTE — TELEPHONE ENCOUNTER
Spoke with the patient regarding the recommendations. He was offered a list of other providers in the area but insisted that would be a waste of time. He was instructed that no opoid medications would be prescribed. Patient stated that he signed a contract and that he was supposed to be weaned off the medication. Advised that contract became voided when his drug screen was positive for mariajuana and because of this no opoid medications would be prescribed. Patient asked if there were other medications that could be offered. Again advised that this is a face to face appointment to be discussed with the provider. There are other alternative medications that can be offered and asked what these might be. Advised that based on his failed drug screen this is a conversation that will be done face to face and not over the phone. Advised that this conversation is over and he stated that he will keep his appointment as scheduled for Aug 8th.

## 2018-07-25 NOTE — TELEPHONE ENCOUNTER
Please invite him to make a clinic follow up visit to discuss termination of opioid therapy.  This is not a discussion to be had over the phone.  Further, I would like to see him anyway to assess his response to the RFA.  Finally, if he desires continued opioid therapy, he would will need to see another provider and we can provider him with a list of local pain providers to consider.    Eric Aguilera Jr, MD  Interventional Pain Medicine / Anesthesiology

## 2018-07-29 ENCOUNTER — PATIENT MESSAGE (OUTPATIENT)
Dept: DERMATOLOGY | Facility: CLINIC | Age: 53
End: 2018-07-29

## 2018-07-30 ENCOUNTER — PATIENT MESSAGE (OUTPATIENT)
Dept: DERMATOLOGY | Facility: CLINIC | Age: 53
End: 2018-07-30

## 2018-08-03 ENCOUNTER — PATIENT MESSAGE (OUTPATIENT)
Dept: FAMILY MEDICINE | Facility: CLINIC | Age: 53
End: 2018-08-03

## 2018-08-03 RX ORDER — LISINOPRIL 10 MG/1
TABLET ORAL
Qty: 45 TABLET | Refills: 1 | Status: SHIPPED | OUTPATIENT
Start: 2018-08-03

## 2018-08-06 DIAGNOSIS — I10 ESSENTIAL HYPERTENSION: ICD-10-CM

## 2018-08-07 PROBLEM — R89.2 ABNORMAL DRUG SCREEN: Status: ACTIVE | Noted: 2018-08-07

## 2018-08-07 RX ORDER — AMLODIPINE BESYLATE 10 MG/1
10 TABLET ORAL DAILY
Qty: 90 TABLET | Refills: 1 | Status: SHIPPED | OUTPATIENT
Start: 2018-08-07

## 2018-08-07 RX ORDER — ISOSORBIDE MONONITRATE 30 MG/1
60 TABLET, EXTENDED RELEASE ORAL DAILY
Qty: 180 TABLET | Refills: 1 | Status: SHIPPED | OUTPATIENT
Start: 2018-08-07

## 2018-08-07 NOTE — PROGRESS NOTES
[facility] New Patient Evaluation    Referred by: Becky Nassar MD  Reason for referral:   M51.36 (ICD-10-CM) - Degeneration of lumbar intervertebral disc   G62.9 (ICD-10-CM) - Polyneuropathy     CC: low back pain    Last 3 PDI Scores 6/15/2018   Pain Disability Index (PDI) 54     Interval Update:  8/8/18 - Patient returns today after completion of Bilateral L3,4,5 MB block and RFA reporting 50% relief.  Since his last visit, his UDS was reviewed and positive for THC.  He was informed that I would not continue to provide prescriptions for opioid medications, even for weaning, as the use of THC violates the opioid contract.    Background: Mike Barcenas Jr. is a 53 y.o. male referred for medical management of chronic pain. He has chronic pain in his lower back into right groin.  He is a former patient of Dr. Parker who prescribed Norco  q6 prn #120/month.  He has Bipolar DO Type 1 and sees a psychiatry and a therapist for management.  He states that he is very interested in eliminating his use of opioids but is fearful of withdrawals.    Location: low back  Severity: Currently: 7/10   Typical Range: 8/10     Exacerbation: 9/10   Onset: 10-12 years ago  Quality: Aching, Sharp and Shooting  Radiation: left groin  Axial/Extremity Percentage of Pain: 100/0  Exacerbating Factors: standing for more than 8 minutes  Mitigating Factors: medications  Assoc: denies night fever/night sweats, urinary incontinence, bowel incontinence, significant weight loss, significant motor weakness and loss of sensations    Previous Therapies:  PT: none recently  HEP: yes  TENS:  Injections: > 10 years ago, SIA's with no relief  Surgery:  None  Medications:   - NSAIDS:   - MSK Relaxants:   - TCAs:   - SNRIs:   - Topicals:   - Anticonvulsants:   - Opioids:     Current Pain Medications:  1. Norco 10-325mg q6h but has weaned himself to TID    Full Medication List:    Current Outpatient Prescriptions:     amLODIPine  (NORVASC) 10 MG tablet, Take 1 tablet (10 mg total) by mouth once daily., Disp: 90 tablet, Rfl: 1    ascorbic acid (VITAMIN C) 500 MG tablet, Take 500 mg by mouth once daily., Disp: , Rfl:     atorvastatin (LIPITOR) 40 MG tablet, Take 1 tablet (40 mg total) by mouth once daily., Disp: 90 tablet, Rfl: 3    CALCIUM 600 WITH VITAMIN D3 600 mg(1,500mg) -200 unit Tab, , Disp: , Rfl:     calcium carbonate-vitamin D3 500mg (1,250mg) -600 unit Tab, once daily. , Disp: , Rfl:     diazePAM (VALIUM) 5 MG tablet, Take 1 tablet (5 mg total) by mouth 2 (two) times daily as needed for Anxiety., Disp: 180 tablet, Rfl: 0    divalproex (DEPAKOTE) 500 MG TbEC, TAKE 2 TABLETS EVERY MORNING AND TAKE 3 TABLETS AT BEDTIME, Disp: 450 tablet, Rfl: 0    FLUoxetine (PROZAC) 20 MG capsule, Take 1 capsule (20 mg total) by mouth every morning., Disp: 90 capsule, Rfl: 0    folic acid (FOLVITE) 800 MCG Tab, Take 800 mcg by mouth once daily. , Disp: , Rfl:     HYDROcodone-acetaminophen (NORCO)  mg per tablet, Take 1 tablet by mouth every 8 (eight) hours as needed for Pain., Disp: , Rfl:     isosorbide mononitrate (IMDUR) 30 MG 24 hr tablet, Take 2 tablets (60 mg total) by mouth once daily., Disp: 180 tablet, Rfl: 3    lisinopril 10 MG tablet, TAKE 1/2 TABLET BY MOUTH DAILY FOR BLOOD PRESSURE., Disp: 45 tablet, Rfl: 1    metFORMIN (GLUCOPHAGE) 850 MG tablet, , Disp: , Rfl:     multivitamin (THERAGRAN) per tablet, , Disp: , Rfl:     QUEtiapine (SEROQUEL) 300 MG Tab, Take 1 tablet (300 mg total) by mouth every evening., Disp: 90 tablet, Rfl: 0    scopolamine (TRANSDERM-SCOP) 1.3-1.5 mg (1 mg over 3 days), Place 1 patch onto the skin every 72 hours., Disp: 4 patch, Rfl: 0    VITAMIN B-12 50 mcg Lozg, Take 1 tablet by mouth once daily. , Disp: , Rfl:     vitamin E 400 UNIT capsule, Take 400 Units by mouth once daily. , Disp: , Rfl:      Review of Systems:  Review of Systems   Constitutional: Negative for chills and fever.   HENT:  Negative for nosebleeds.    Eyes: Negative for pain.   Respiratory: Negative for hemoptysis.    Cardiovascular: Negative for chest pain.   Gastrointestinal: Negative for nausea and vomiting.   Genitourinary: Negative for dysuria.   Musculoskeletal: Positive for back pain and neck pain.   Skin: Negative for rash.   Neurological: Negative for sensory change and focal weakness.   Endo/Heme/Allergies: Does not bruise/bleed easily.   Psychiatric/Behavioral: Positive for depression. The patient is nervous/anxious and has insomnia.        Allergies:  Ciprofloxacin     Medical History:  Past Medical History:   Diagnosis Date    Arthritis     Bipolar 1 disorder     Brain aneurysm 2015    CAD (coronary artery disease)     Patient denies    Diabetes mellitus     No longer takes medicine A1C at correct level    History of psychiatric hospitalization     [facility]    Hyperlipidemia     Hypertension     Lumbar disc disease     Sleep apnea     doesn't use CPAP        Surgical History:  Past Surgical History:   Procedure Laterality Date    analplasty      COLONOSCOPY N/A 2/21/2017    Procedure: COLONOSCOPY;  Surgeon: All Blankenship Jr., MD;  Location: [facility] ENDO;  Service: Endoscopy;  Laterality: N/A;    HEMORRHOID SURGERY      INJECTION OF ANESTHETIC AGENT AROUND MEDIAL BRANCH NERVES INNERVATING LUMBAR FACET JOINT Bilateral 6/26/2018    Procedure: Block-nerve-medial branch-lumbar L3, L4, L5;  Surgeon: Eric Aguilera Jr., MD;  Location: [facility] OR;  Service: Pain Management;  Laterality: Bilateral;    KNEE SURGERY Right     RADIOFREQUENCY ABLATION OF LUMBAR MEDIAL BRANCH NERVE AT SINGLE LEVEL Bilateral 7/17/2018    Procedure: RADIOFREQUENCY ABLATION, NERVE, MEDIAL BRANCH, LUMBAR L3, L4, L5;  Surgeon: Eric Aguilera Jr., MD;  Location: [facility] OR;  Service: Pain Management;  Laterality: Bilateral;    TONSILLECTOMY          Social History:  Social History     Social History    Marital status:       Spouse name: N/A    Number of children: N/A    Years of education: N/A     Occupational History    Not on file.     Social History Main Topics    Smoking status: Current Every Day Smoker     Packs/day: 1.00     Types: Cigarettes    Smokeless tobacco: Never Used    Alcohol use Yes      Comment: 3-4 beers every other week    Drug use: No    Sexual activity: Yes     Partners: Female     Other Topics Concern    Not on file     Social History Narrative    The patient lives in [place] with his father. The patient is exercising on a treadmill. He goes to [employer]. The patient is a smoker about a  pack a day. He no longer smokes in his home. Patient is no longer drinking alcohol. The patient does not use illicit drugs. He is not currently working but is staying active growing a garden. He was approved for social security disability. He completed his day program. He is not riding his motorcycle currently. He  his house and move in with his father in [place]. He is raising chickens. He is getting acclimated and active in his community. 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        Physical Exam:  There were no vitals filed for this visit.  General    Nursing note and vitals reviewed.  Constitutional: He is oriented to person, place, and time. He appears well-developed and well-nourished. No distress.   HENT:   Head: Normocephalic and atraumatic.   Nose: Nose normal.   Eyes: Conjunctivae and EOM are normal. Pupils are equal, round, and reactive to light. Right eye exhibits no discharge. Left eye exhibits no discharge. No scleral icterus.   Neck: No JVD present.   Cardiovascular: Intact distal pulses.    Pulmonary/Chest: Effort normal. No respiratory distress.   Abdominal: He exhibits no distension.   Neurological: He is alert and oriented to person, place, and time. Coordination normal.   Psychiatric: He has a normal mood and affect. His behavior is normal. Judgment and thought  content normal.     General Musculoskeletal Exam   Gait: normal     Back (L-Spine & T-Spine) / Neck (C-Spine) Exam     Tenderness Right paramedian tenderness of the Lower L-Spine. Left paramedian tenderness of the Lower L-Spine.     Back (L-Spine & T-Spine) Range of Motion   Extension: abnormal   Flexion: abnormal     Spinal Sensation   Right Side Sensation  L-Spine Level: normal  Left Side Sensation  L-Spine Level: normal    Other He has no scoliosis .      Muscle Strength   Right Lower Extremity   Hip Flexion: 5/5   Hip Extensors: 5/5  Quadriceps:  5/5   Hamstrin/5   Gastrocsoleus:  5/5/5  Left Lower Extremity   Hip Flexion: 5/5   Hip Extensors: 5/5  Quadriceps:  5/5   Hamstrin/5   Gastrocsoleus:  /    Reflexes     Left Side  Quadriceps:  2+  Achilles:  2+    Right Side   Quadriceps:  2+  Achilles:  2+      Imaging:  3/8/10 - MRI - Lumbar Spine   (see media)      Labs:  BMP  Lab Results   Component Value Date     2018     2018    K 5.4 (H) 2018    K 5.0 2018     2018     2018    CO2 27 2018    CO2 24 2018    BUN 25 (H) 2018    BUN 24 (H) 2018    CREATININE 0.8 2018    CREATININE 0.7 2018    CALCIUM 9.9 2018    CALCIUM 9.7 2018    ANIONGAP 6 (L) 2018    ANIONGAP 8 2018    ESTGFRAFRICA >60.0 2018    ESTGFRAFRICA >60.0 2018    EGFRNONAA >60.0 2018    EGFRNONAA >60.0 2018     Lab Results   Component Value Date    ALT 9 (L) 2018    ALT 10 2018    AST 28 2018    AST 29 2018    ALKPHOS 60 2018    ALKPHOS 63 2018    BILITOT 0.4 2018    BILITOT 0.5 2018       Assessment:  Problem List Items Addressed This Visit     Degeneration of lumbar intervertebral disc    Relevant Orders    Ambulatory Referral to Physical/Occupational Therapy    Bipolar I disorder    Major depressive disorder, single episode    Chronic, continuous  use of opioids    Bipolar affective disorder, currently depressed, mild    Chronic pain syndrome - Primary    Relevant Orders    Ambulatory Referral to Physical/Occupational Therapy    Lumbar spondylosis    Relevant Orders    Ambulatory Referral to Physical/Occupational Therapy    Chronic bilateral low back pain without sciatica    Spondylosis of lumbar spine    Relevant Orders    Ambulatory Referral to Physical/Occupational Therapy    Abnormal drug screen          52-year-old male with chronic low back pain likely due to facet arthropathy who was referred by his new primary care physician for management of his opioid therapy and for consideration of interventional therapies.  The patient has a very significant psychiatric history which includes bipolar disorder type 1.  He does appear to have appropriate support and management of his bipolar disease at this time and I have not a covered any red flag behavior during today's encounter that would immediately impact his candidacy for opioid therapy.  I informed him my typical practice to reduce dependency on opioids through physical therapy, home exercise, optimization of mental health, use of non-opioid medicines, and interventional procedures.  He appears very motivated.  I recommend starting with bilateral L3, 4, 5 medial branch block for diagnostic purposes followed by radiofrequency ablation if appropriate.    I will not refill his opioid medication today.  He reports having 18 tablets remaining which will last him 6 days at his current usage rate of t.i.d..  I plan to refill the medication next week (06/20/2018) once the preliminary results from his urine drug screen are available to confirm abstinence from illegal substances.  I informed him that I will refill the medication to be taken t.i.d. p.r.n. for the 1st month, followed by BID for the 2nd month, and finally once daily for the 3rd month.  At that point I will consider stopping the medicine or  transitioning to Norco 5-325.    8/8/18 - as stated above, this patient is longer receiving opioid therapy from me due to the presence of THC in his urine drug screen.  He was already a poor candidate for chronic opioid therapy due to his bipolar depression.  He reports that he has been free from the medication for several weeks now and it is his desire not to return to the medication.  We will continue to pursue other non opioid routes of management.  With today, we will start with the addition of Neurontin and Celebrex to his medication regimen for treatment of the neuropathic and inflammatory component of his pain.  I will also refer him to physical therapy for core strengthening and for training to establish home exercise program.      Treatment Plan:   PT/OT/HEP: Referral placed to PT  Procedures: Completed lumbar RFA.  No additional recommended at this time.  Medications:    - Avoid SNRIs, TCAs, or Anticonvulsants (gabapentin, lyrica) for now as they may exacerbate depression.  These medications should be closely monitored by psychiatric professionals given his Hx.   - Patient not a candidate for opioid therapy from this provider due to presence of THC on UDS and confirmation test   - Start Celebrex 200 mg BID   - Start Neurontin 300 mg QHS  Imaging: No additional imaging recommended at this time.  Labs: Reviewed.  Medications are appropriately dosed for current hepatorenal function.    Follow Up: RTC after injections    Eric Aguilera Jr, MD  Interventional Pain Medicine / Anesthesiology    Disclaimer: This note was partly generated using dictation software which may occasionally result in transcription errors.

## 2018-08-08 ENCOUNTER — OFFICE VISIT (OUTPATIENT)
Dept: PAIN MEDICINE | Facility: CLINIC | Age: 53
End: 2018-08-08
Payer: MEDICARE

## 2018-08-08 ENCOUNTER — TELEPHONE (OUTPATIENT)
Dept: PAIN MEDICINE | Facility: CLINIC | Age: 53
End: 2018-08-08

## 2018-08-08 VITALS
WEIGHT: 196 LBS | HEART RATE: 67 BPM | SYSTOLIC BLOOD PRESSURE: 116 MMHG | DIASTOLIC BLOOD PRESSURE: 88 MMHG | HEIGHT: 69 IN | BODY MASS INDEX: 29.03 KG/M2

## 2018-08-08 DIAGNOSIS — F11.90 CHRONIC, CONTINUOUS USE OF OPIOIDS: ICD-10-CM

## 2018-08-08 DIAGNOSIS — M51.36 DEGENERATION OF LUMBAR INTERVERTEBRAL DISC: ICD-10-CM

## 2018-08-08 DIAGNOSIS — F31.9 BIPOLAR I DISORDER: ICD-10-CM

## 2018-08-08 DIAGNOSIS — M54.50 CHRONIC BILATERAL LOW BACK PAIN WITHOUT SCIATICA: ICD-10-CM

## 2018-08-08 DIAGNOSIS — M47.816 LUMBAR SPONDYLOSIS: ICD-10-CM

## 2018-08-08 DIAGNOSIS — F32.9 MAJOR DEPRESSIVE DISORDER WITH SINGLE EPISODE, REMISSION STATUS UNSPECIFIED: ICD-10-CM

## 2018-08-08 DIAGNOSIS — R89.2 ABNORMAL DRUG SCREEN: ICD-10-CM

## 2018-08-08 DIAGNOSIS — M47.816 SPONDYLOSIS OF LUMBAR SPINE: ICD-10-CM

## 2018-08-08 DIAGNOSIS — G89.4 CHRONIC PAIN SYNDROME: Primary | ICD-10-CM

## 2018-08-08 DIAGNOSIS — F31.31 BIPOLAR AFFECTIVE DISORDER, CURRENTLY DEPRESSED, MILD: ICD-10-CM

## 2018-08-08 DIAGNOSIS — G89.29 CHRONIC BILATERAL LOW BACK PAIN WITHOUT SCIATICA: ICD-10-CM

## 2018-08-08 PROCEDURE — 99999 PR PBB SHADOW E&M-EST. PATIENT-LVL III: CPT | Mod: PBBFAC,,, | Performed by: PAIN MEDICINE

## 2018-08-08 PROCEDURE — 99214 OFFICE O/P EST MOD 30 MIN: CPT | Mod: S$GLB,,, | Performed by: PAIN MEDICINE

## 2018-08-08 PROCEDURE — 3079F DIAST BP 80-89 MM HG: CPT | Mod: CPTII,S$GLB,, | Performed by: PAIN MEDICINE

## 2018-08-08 PROCEDURE — 3074F SYST BP LT 130 MM HG: CPT | Mod: CPTII,S$GLB,, | Performed by: PAIN MEDICINE

## 2018-08-08 PROCEDURE — 3008F BODY MASS INDEX DOCD: CPT | Mod: CPTII,S$GLB,, | Performed by: PAIN MEDICINE

## 2018-08-08 RX ORDER — GABAPENTIN 300 MG/1
300 CAPSULE ORAL 3 TIMES DAILY
Qty: 90 CAPSULE | Refills: 0 | OUTPATIENT
Start: 2018-08-08 | End: 2018-10-08 | Stop reason: SDUPTHER

## 2018-08-08 RX ORDER — CELECOXIB 200 MG/1
200 CAPSULE ORAL 2 TIMES DAILY
Qty: 60 CAPSULE | Refills: 0 | Status: SHIPPED | OUTPATIENT
Start: 2018-08-08 | End: 2018-09-11 | Stop reason: SDUPTHER

## 2018-08-08 NOTE — TELEPHONE ENCOUNTER
----- Message from Krystal Lázarojanet sent at 8/8/2018 11:54 AM CDT -----  Contact: Self  Patient states that out of the two scripts that the doctor was to send over he only received one of them, his gabapentin (NEURONTIN) 300 MG capsule in his chart is showing that it was printed instead of e-scribed.  Please get this sent over and call the patient back to let him know at [phone] (home).  Thanks  [facility] - [place] - [id number]  [id number]  [place] [address]  Phone: [phone] Fax: [phone]

## 2018-08-28 ENCOUNTER — LAB VISIT (OUTPATIENT)
Dept: LAB | Facility: HOSPITAL | Age: 53
End: 2018-08-28
Attending: PSYCHIATRY & NEUROLOGY
Payer: MEDICARE

## 2018-08-28 DIAGNOSIS — Z79.899 ENCOUNTER FOR LONG-TERM (CURRENT) USE OF MEDICATIONS: ICD-10-CM

## 2018-08-28 LAB
ALBUMIN SERPL BCP-MCNC: 3.7 G/DL
ALP SERPL-CCNC: 55 U/L
ALT SERPL W/O P-5'-P-CCNC: 6 U/L
ANION GAP SERPL CALC-SCNC: 6 MMOL/L
AST SERPL-CCNC: 17 U/L
BASOPHILS # BLD AUTO: 0.1 K/UL
BASOPHILS NFR BLD: 1.3 %
BILIRUB SERPL-MCNC: 0.5 MG/DL
BUN SERPL-MCNC: 21 MG/DL
CALCIUM SERPL-MCNC: 8.8 MG/DL
CHLORIDE SERPL-SCNC: 106 MMOL/L
CO2 SERPL-SCNC: 30 MMOL/L
CREAT SERPL-MCNC: 0.7 MG/DL
DIFFERENTIAL METHOD: ABNORMAL
EOSINOPHIL # BLD AUTO: 0.3 K/UL
EOSINOPHIL NFR BLD: 3.3 %
ERYTHROCYTE [DISTWIDTH] IN BLOOD BY AUTOMATED COUNT: 13.2 %
EST. GFR  (AFRICAN AMERICAN): >60 ML/MIN/1.73 M^2
EST. GFR  (NON AFRICAN AMERICAN): >60 ML/MIN/1.73 M^2
GLUCOSE SERPL-MCNC: 136 MG/DL
HCT VFR BLD AUTO: 43.6 %
HGB BLD-MCNC: 14.2 G/DL
IMM GRANULOCYTES # BLD AUTO: 0.08 K/UL
IMM GRANULOCYTES NFR BLD AUTO: 1.1 %
LYMPHOCYTES # BLD AUTO: 3.8 K/UL
LYMPHOCYTES NFR BLD: 50.3 %
MCH RBC QN AUTO: 33 PG
MCHC RBC AUTO-ENTMCNC: 32.6 G/DL
MCV RBC AUTO: 101 FL
MONOCYTES # BLD AUTO: 0.6 K/UL
MONOCYTES NFR BLD: 7.3 %
NEUTROPHILS # BLD AUTO: 2.8 K/UL
NEUTROPHILS NFR BLD: 36.7 %
NRBC BLD-RTO: 0 /100 WBC
PLATELET # BLD AUTO: 129 K/UL
PMV BLD AUTO: 11.6 FL
POTASSIUM SERPL-SCNC: 5 MMOL/L
PROT SERPL-MCNC: 6.4 G/DL
RBC # BLD AUTO: 4.3 M/UL
SODIUM SERPL-SCNC: 142 MMOL/L
VALPROATE SERPL-MCNC: 90.1 UG/ML
WBC # BLD AUTO: 7.5 K/UL

## 2018-08-28 PROCEDURE — 80164 ASSAY DIPROPYLACETIC ACD TOT: CPT

## 2018-08-28 PROCEDURE — 80053 COMPREHEN METABOLIC PANEL: CPT

## 2018-08-28 PROCEDURE — 36415 COLL VENOUS BLD VENIPUNCTURE: CPT | Mod: PO

## 2018-08-28 PROCEDURE — 85025 COMPLETE CBC W/AUTO DIFF WBC: CPT

## 2018-09-11 ENCOUNTER — PROCEDURE VISIT (OUTPATIENT)
Dept: DERMATOLOGY | Facility: CLINIC | Age: 53
End: 2018-09-11
Payer: MEDICARE

## 2018-09-11 DIAGNOSIS — D22.9 ATYPICAL NEVUS: Primary | ICD-10-CM

## 2018-09-11 PROCEDURE — 88305 TISSUE EXAM BY PATHOLOGIST: CPT | Performed by: PATHOLOGY

## 2018-09-11 PROCEDURE — 88305 TISSUE EXAM BY PATHOLOGIST: CPT | Mod: 26,,, | Performed by: PATHOLOGY

## 2018-09-11 PROCEDURE — 11402 EXC TR-EXT B9+MARG 1.1-2 CM: CPT | Mod: PBBFAC | Performed by: DERMATOLOGY

## 2018-09-11 PROCEDURE — 12032 INTMD RPR S/A/T/EXT 2.6-7.5: CPT | Mod: PBBFAC | Performed by: DERMATOLOGY

## 2018-09-11 PROCEDURE — 99499 UNLISTED E&M SERVICE: CPT | Mod: S$PBB,,, | Performed by: DERMATOLOGY

## 2018-09-11 PROCEDURE — 12032 INTMD RPR S/A/T/EXT 2.6-7.5: CPT | Mod: S$PBB,,, | Performed by: DERMATOLOGY

## 2018-09-11 PROCEDURE — 11402 EXC TR-EXT B9+MARG 1.1-2 CM: CPT | Mod: 51,S$PBB,, | Performed by: DERMATOLOGY

## 2018-09-11 RX ORDER — CELECOXIB 200 MG/1
200 CAPSULE ORAL 2 TIMES DAILY
Qty: 60 CAPSULE | Refills: 0 | Status: SHIPPED | OUTPATIENT
Start: 2018-09-11 | End: 2019-05-24

## 2018-09-11 NOTE — PATIENT INSTRUCTIONS
Post- Operative Wound Care    Your doctor has performed local skin surgery today.  Vaseline ointment and a pressure bandage were placed after the surgery.  It is very important that you keep this bandage in place for 24 hours.  This will decrease the risk of post - operative infection and bleeding.  After 24 hours, you may remove the band aid and wash the area with warm soap and water and apply Vaseline ointment.  Many patients prefer to use Neosporin or Bacitracin ointment.  This is acceptable; however know that you can develop an allergy to this medication even if you have used it safely for years.  It is important to keep the area moist.  Letting it dry out and get air slows healing time, will worsen the scar, and make it more difficult to remove the stitches.  Band aid is optional after first 24 hours.        If you notice increasing redness, tenderness, pain, or yellow drainage at the biopsy or surgical site, please notify your doctor.  These are signs of an infection.    If your biopsy/surgical site is bleeding, apply firm pressure for 15 minutes straight.  Repeat for another 15 minutes, if it is still bleeding.   If the surgical site continues to bleed, then please contact your doctor.      [address] [facility] [place] [address]/ [phone] FAX/ [email or web address]

## 2018-09-11 NOTE — PROGRESS NOTES
PROCEDURE: Elliptical excision with intermediate layered repair in order to decrease dead space, decrease tension and close large gap.    ANESTHETIC: 6 cc 1% Xylocaine with Epinephrine 1:100,000, buffered    SURGEON: Mary Kate Parrish M.D.    ASSISTANTS: Radha Shine MA    PREOPERATIVE DIAGNOSIS:  Moderately Atypical Nevus    POSTOPERATIVE DIAGNOSIS:  Same as preoperative diagnosis    PATHOLOGIC DIAGNOSIS: Pending    LOCATION: left back    INITIAL LESION SIZE: 0.5 cm    EXCISED DIAMETER: 1.1 cm    PREPARATION: The diagnosis, procedure, alternatives, benefits and risks, including but not limited to: infection, bleeding/bruising, drug reactions, pain, scar or cosmetic defect, local sensation disturbances, wound dehiscence (separation of wound edges after sutures removed) and/or recurrence of present condition were explained to the patient. The patient elected to proceed.  Patient's identity was verified using 2 patient identifiers and the side and site was verified.  Time out period with surgeon, assistant and patient in surgical suite was taken.    PROCEDURE: The location noted above was prepped, draped, and anesthetized in the usual sterile fashion per Radha Shine MA. Lesional tissue was carefully marked with at least 3 mm margins of clinically normal skin in all directions. A fusiform elliptical excision was done with #15 blade carried down completely through the dermis into the deep subcutaneous tissues to the level of the non-muscle fascia, and dissection was carried out in that plane.  Electrocoagulation was used to obtain hemostasis. Blood loss was minimal. The wound was then approximated in a layered fashion with subcutaneous and intradermal sutures of 4.0 Monocryl, approximately 3 in number, and the wound was then superficially closed with simple interrupted sutures of 4.0 Prolene.    The patient tolerated the procedure well.    The area was cleaned and dressed appropriately and the patient was given  wound care instructions, as well as an appointment for follow-up evaluation.    LENGTH OF REPAIR: 3.0 cm

## 2018-09-24 ENCOUNTER — OFFICE VISIT (OUTPATIENT)
Dept: PSYCHIATRY | Facility: CLINIC | Age: 53
End: 2018-09-24
Payer: MEDICARE

## 2018-09-24 VITALS
SYSTOLIC BLOOD PRESSURE: 148 MMHG | BODY MASS INDEX: 35.83 KG/M2 | DIASTOLIC BLOOD PRESSURE: 80 MMHG | WEIGHT: 242.63 LBS | HEART RATE: 69 BPM

## 2018-09-24 DIAGNOSIS — F41.1 ANXIETY STATE: Primary | ICD-10-CM

## 2018-09-24 DIAGNOSIS — F31.31 BIPOLAR 1 DISORDER, DEPRESSED, MILD: ICD-10-CM

## 2018-09-24 PROCEDURE — 3008F BODY MASS INDEX DOCD: CPT | Mod: CPTII,,, | Performed by: PSYCHIATRY & NEUROLOGY

## 2018-09-24 PROCEDURE — 99213 OFFICE O/P EST LOW 20 MIN: CPT | Mod: S$PBB,,, | Performed by: PSYCHIATRY & NEUROLOGY

## 2018-09-24 PROCEDURE — 99999 PR PBB SHADOW E&M-EST. PATIENT-LVL II: CPT | Mod: PBBFAC,,, | Performed by: PSYCHIATRY & NEUROLOGY

## 2018-09-24 PROCEDURE — 90833 PSYTX W PT W E/M 30 MIN: CPT | Mod: S$PBB,,, | Performed by: PSYCHIATRY & NEUROLOGY

## 2018-09-24 PROCEDURE — 3077F SYST BP >= 140 MM HG: CPT | Mod: CPTII,,, | Performed by: PSYCHIATRY & NEUROLOGY

## 2018-09-24 PROCEDURE — 90833 PSYTX W PT W E/M 30 MIN: CPT | Mod: PBBFAC | Performed by: PSYCHIATRY & NEUROLOGY

## 2018-09-24 PROCEDURE — 3079F DIAST BP 80-89 MM HG: CPT | Mod: CPTII,,, | Performed by: PSYCHIATRY & NEUROLOGY

## 2018-09-24 PROCEDURE — 99212 OFFICE O/P EST SF 10 MIN: CPT | Mod: PBBFAC | Performed by: PSYCHIATRY & NEUROLOGY

## 2018-09-24 RX ORDER — FLUOXETINE HYDROCHLORIDE 20 MG/1
20 CAPSULE ORAL EVERY MORNING
Qty: 90 CAPSULE | Refills: 0 | Status: SHIPPED | OUTPATIENT
Start: 2018-09-24 | End: 2018-12-31 | Stop reason: SDUPTHER

## 2018-09-24 RX ORDER — DIAZEPAM 5 MG/1
5 TABLET ORAL 2 TIMES DAILY PRN
Qty: 180 TABLET | Refills: 0 | Status: SHIPPED | OUTPATIENT
Start: 2018-09-24 | End: 2018-12-03 | Stop reason: SDUPTHER

## 2018-09-24 RX ORDER — DIVALPROEX SODIUM 500 MG/1
TABLET, DELAYED RELEASE ORAL
Qty: 450 TABLET | Refills: 0 | Status: SHIPPED | OUTPATIENT
Start: 2018-09-24 | End: 2018-12-31 | Stop reason: SDUPTHER

## 2018-09-24 RX ORDER — QUETIAPINE FUMARATE 300 MG/1
300 TABLET, FILM COATED ORAL NIGHTLY
Qty: 90 TABLET | Refills: 0 | Status: SHIPPED | OUTPATIENT
Start: 2018-09-24 | End: 2019-01-07 | Stop reason: SDUPTHER

## 2018-09-24 NOTE — PROGRESS NOTES
Outpatient Psychiatry Follow-Up Visit (MD/NP)    9/24/2018    Clinical Status of Patient:  Outpatient (Ambulatory)    Chief Complaint:  Mike Barcenas Jr. is a 53 y.o. male who presents today for follow-up of bipolar disorder and anxiety  Met with patient.      Interval History and Content of Current Session:  Interim Events/Subjective Report/Content of Current Session:  53 y.o. male with a history of several years of treatment for depression and comorbid substance issues with a lot of irritability and behavioral problems.  Switched to bipolar regime in 2009 and did 2 weeks at [facility].  Since then much more stable.      Today the patient reports he is not doing well.  An attempt to treat his pain he smoked marijuana resulting in a positive drug screen his pain clinic and no longer being prescribed opiates.  He has been prescribed other medications including gabapentin however is unable to tolerate a therapeutic dose of this medication due to sedation.  He is taking it only at night.  He is much less active because he is unable to engage in his gardening activities due to pain and because medication makes him feel lazy.  Additionally he has attempted to take Valium more frequently, 3 times daily, in order to treat pain with minimal improvement.  Injections and physical therapy have not improved the situation.  He is hopeful that an appointment with neurosurgery this week will provide him some hope.    He denies suicidal ideation.  He is eating adequately.  He is sleeping adequately.    Reviewed most recent labs        Psychotherapy:    · Target symptoms: anxiety , mood swings  · Why chosen therapy is appropriate versus another modality: relevant to diagnosis  · Outcome monitoring methods: self-report, observation  · Therapeutic intervention type: supportive psychotherapy  · Topics discussed/themes: stress related to medical comorbidities, building skills sets for symptom management, symptom recognition, life stage  transitional issues,   · The patient's response to the intervention is accepting. The patient's progress toward treatment goals is good.   · Duration of intervention: 16 minutes    Review of Systems   · PSYCHIATRIC: Pertinant items are noted in the narrative.  · CONSTITUTIONAL: Positive for weight gain.     Past Medical, Family and Social History: The patient's past medical, family and social history have been reviewed and updated as appropriate within the electronic medical record - see encounter notes.    seroquel 400mg qhs   Valium 5 mg bid prn  Prozac 20mg daily  depakote 1000mg qam and 1500mg qhs       Wt Readings from Last 3 Encounters:   09/24/18 110.1 kg (242 lb 9.9 oz)   08/08/18 88.9 kg (195 lb 15.8 oz)   06/26/18 88.9 kg (196 lb)     Lab Results   Component Value Date    VALPROATE 90.1 08/28/2018     Lab Results   Component Value Date     (L) 08/28/2018     Lab Results   Component Value Date    ALT 6 (L) 08/28/2018    AST 17 08/28/2018    ALKPHOS 55 08/28/2018    BILITOT 0.5 08/28/2018     Compliance: yes  Side effects: None     Risk Parameters:  Patient reports no suicidal ideation  Patient reports no homicidal ideation  Patient reports no self-injurious behavior  Patient reports no violent behavior    Exam (detailed: at least 9 elements; comprehensive: all 15 elements)   Constitutional  Vitals:  Most recent vital signs, dated less than 90 days prior to this appointment, were reviewed.    Vitals:    09/24/18 1034   BP: (!) 148/80   Pulse: 69   Weight: 110.1 kg (242 lb 9.9 oz)        General:  age appropriate, casually dressed, neatly groomed, noticeable weight gain     Musculoskeletal  Muscle Strength/Tone:  no dyskinesia, no tremor   Gait & Station:  non-ataxic   AIMS completed  Psychiatric  Speech:  not pressured or loud but clearly audible   Mood:  Affect:  angry  appropriate, sad   Thought Process:  goal-directed, logical   Associations:  intact   Thought Content:  normal, no suicidality, no  homicidality, delusions, or paranoia   Insight:  has awareness of illness   Judgement: behavior is adequate to circumstances   Orientation:  grossly intact   Memory: intact for content of interview   Language: grossly intact   Attention Span & Concentration:  able to focus   Fund of Knowledge:  intact and appropriate to age and level of education     Assessment and Diagnosis   Status/Progress: Based on the examination today, the patient's problem(s) is/are adequately but not ideally controlled.  New problems have not been presented today .   Comorbidities are not complicating management of the primary condition.  There are no active rule-out diagnoses for this patient at this time.    General Impression:  53 y.o. with bipolar disorder and anxiety currently reasonably well controlled.  He is reducing benzodiazepine use.             Diagnosis:  Bipolar I disorder MRE depressed mild  Anxiety D/O NOS      Intervention/Counseling/Treatment Plan   · Continue Seroquel 300 mg nightly.  · Continue to reduce vailum dose  · Continue fluoxetine 20mg, depakote 1000 mg am and 1500 mg pm      Return to Clinic: 3 months

## 2018-09-25 ENCOUNTER — PROCEDURE VISIT (OUTPATIENT)
Dept: DERMATOLOGY | Facility: CLINIC | Age: 53
End: 2018-09-25
Payer: MEDICARE

## 2018-09-25 DIAGNOSIS — C44.619 BASAL CELL CARCINOMA (BCC) OF LEFT UPPER ARM: Primary | ICD-10-CM

## 2018-09-25 PROCEDURE — 99499 UNLISTED E&M SERVICE: CPT | Mod: S$PBB,,, | Performed by: DERMATOLOGY

## 2018-09-25 PROCEDURE — 13121 CMPLX RPR S/A/L 2.6-7.5 CM: CPT | Mod: S$PBB,,, | Performed by: DERMATOLOGY

## 2018-09-25 PROCEDURE — 13121 CMPLX RPR S/A/L 2.6-7.5 CM: CPT | Mod: PBBFAC | Performed by: DERMATOLOGY

## 2018-09-25 PROCEDURE — 11602 EXC TR-EXT MAL+MARG 1.1-2 CM: CPT | Mod: PBBFAC | Performed by: DERMATOLOGY

## 2018-09-25 PROCEDURE — 88305 TISSUE EXAM BY PATHOLOGIST: CPT | Performed by: PATHOLOGY

## 2018-09-25 PROCEDURE — 11602 EXC TR-EXT MAL+MARG 1.1-2 CM: CPT | Mod: 51,S$PBB,, | Performed by: DERMATOLOGY

## 2018-09-25 NOTE — PATIENT INSTRUCTIONS
Post- Operative Wound Care    Your doctor has performed local skin surgery today.  Vaseline ointment and a pressure bandage were placed after the surgery.  It is very important that you keep this bandage in place for 24 hours.  This will decrease the risk of post - operative infection and bleeding.  After 24 hours, you may remove the band aid and wash the area with warm soap and water and apply Vaseline ointment.  Many patients prefer to use Neosporin or Bacitracin ointment.  This is acceptable; however know that you can develop an allergy to this medication even if you have used it safely for years.  It is important to keep the area moist.  Letting it dry out and get air slows healing time, will worsen the scar, and make it more difficult to remove the stitches.  Band aid is optional after first 24 hours.        If you notice increasing redness, tenderness, pain, or yellow drainage at the biopsy or surgical site, please notify your doctor.  These are signs of an infection.    If your biopsy/surgical site is bleeding, apply firm pressure for 15 minutes straight.  Repeat for another 15 minutes, if it is still bleeding.   If the surgical site continues to bleed, then please contact your doctor.      [address] [place] [address]/ [phone] FAX/ [email or web address]

## 2018-09-25 NOTE — PROGRESS NOTES
PROCEDURE: Elliptical excision with complex layered repair in order to decrease dead space, decrease tension and close large gap.    ANESTHETIC: 9 cc 1% Xylocaine with Epinephrine 1:100,000, buffered    SURGEON:  Mary Kate Parrish M.D.    ASSISTANTS:  Pooja Floyd LPN    PREOPERATIVE DIAGNOSIS:  Basal Cell Carcinoma    POSTOPERATIVE DIAGNOSIS:  Same as preoperative diagnosis    PATHOLOGIC DIAGNOSIS: Pending    LOCATION: left post arm    INITIAL LESION SIZE: 0.9 cm    EXCISED DIAMETER: 1.7 cm    PREPARATION: The diagnosis, procedure, alternatives, benefits and risks, including but not limited to: infection, bleeding/bruising, drug reactions, pain, scar or cosmetic defect, local sensation disturbances, wound dehiscence (separation of wound edges after sutures removed) and/or recurrence of present condition were explained to the patient. The patient elected to proceed.  Patient's identity was verified and the site was verified.    PROCEDURE: The location noted above was prepped, draped, and anesthetized in the usual sterile fashion per Pooja Floyd LPN. Lesional tissue was carefully marked with at least 4 mm margins of clinically normal skin in all directions. A fusiform elliptical excision was done with #15 blade carried down completely through the dermis into the deep subcutaneous tissues to the level of the non-muscle fascia, and dissection was carried out in that plane. The wound was widely undermined in all directions. Electrocoagulation was used to obtain hemostasis. Blood loss was minimal. The wound was then approximated in a layered fashion with subcutaneous and intradermal sutures of 4.0 Monocryl, approximately 3 in number, and the wound was then superficially closed with simple interrupted sutures of 4.0 Prolene.    The patient tolerated the procedure well.    The area was cleaned and dressed appropriately and the patient was given wound care instructions, as well as an appointment for follow-up  evaluation.    LENGTH OF REPAIR: 4.0 cm

## 2018-10-08 RX ORDER — GABAPENTIN 300 MG/1
600 CAPSULE ORAL 3 TIMES DAILY
Qty: 180 CAPSULE | Refills: 2 | Status: SHIPPED | OUTPATIENT
Start: 2018-10-08 | End: 2023-03-30

## 2018-10-10 ENCOUNTER — CLINICAL SUPPORT (OUTPATIENT)
Dept: DERMATOLOGY | Facility: CLINIC | Age: 53
End: 2018-10-10
Payer: MEDICARE

## 2018-10-10 PROCEDURE — 99024 POSTOP FOLLOW-UP VISIT: CPT | Mod: ,,, | Performed by: DERMATOLOGY

## 2018-10-10 PROCEDURE — 99211 OFF/OP EST MAY X REQ PHY/QHP: CPT | Mod: PBBFAC

## 2018-10-10 PROCEDURE — 99999 PR PBB SHADOW E&M-EST. PATIENT-LVL I: CPT | Mod: PBBFAC,,,

## 2018-10-10 NOTE — PROGRESS NOTES
Suture Removal note:  CC: 53 y.o. male patient is here for suture removal.         HPI: Patient is s/p excision of Basal Cell Carcinoma from the Left Posterior Arm on 09/25/2018.  Patient reports no problems.    FINAL PATHOLOGIC DIAGNOSIS  1. Skin, left posterior arm, excision:  - BASAL CELL CARCINOMA WITH NODULAR GROWTH PATTERN.  - ALL MARGINS ARE NEGATIVE.  MICROSCOPIC DESCRIPTION: Sections shows a nodular tumor composed of basaloid cells exhibiting peripheral  palisading, retraction artifact and apoptosis.  Diagnosed by: Reilly Winters M.D.  (Electronically Signed: 2018-09-28 09:37:24)    WOUND PE:  Sutures intact.  Wound healing well.  Good approximation of skin edges.  No signs or symptoms of infection.    IMPRESSION:  Excision of Basal Cell Carcinoma from the Left Posterior Arm on 09/25/2018.    PLAN:  Sutures removed today.  Continue wound care.    RTC: In 6 months.

## 2018-10-12 ENCOUNTER — PATIENT MESSAGE (OUTPATIENT)
Dept: ADMINISTRATIVE | Facility: HOSPITAL | Age: 53
End: 2018-10-12

## 2018-10-12 NOTE — LETTER
October 12, 2018    Dr Mao             [facility]  [address]  [facility] [address]  Phone: [phone] October 12, 2018     Patient: Mike Barcenas    YOB: 1965   Date of Visit: 10/12/2018       To Whom It May Concern:    We are seeing Mike Barcenas  in the clinic at [facility].  Josh Gifford MD is their PCP.  She/He has an outstanding lab/procedure at the time we reviewed their chart.  To help with our Health Maintenance records will you please supply the following report(s):      [x]  Eye Exam                             Please Fax to [facility] at .     Thank you for your help.  If my Care Coordinator can be of any assistance, you can call Thea Lee LPN-CCC at [phone].         If you have any questions or concerns, please don't hesitate to call.    Sincerely,        Thea Lee LPN

## 2018-10-12 NOTE — LETTER
October 25, 2018    Dr Kiet Mao             [facility]  [address]  [facility] [address]  Phone: [phone] October 25, 2018     Patient: Mike Barcenas    YOB: 1965   Date of Visit: 10/12/2018       To Whom It May Concern:    We are seeing Mike Barcenas  in the clinic at [facility].  Josh Gifford MD is their PCP.  She/He has an outstanding lab/procedure at the time we reviewed their chart.  To help with our Health Maintenance records will you please supply the following report(s):      [x]  Eye Exam                            Please Fax to [facility] at .     Thank you for your help.  If my Care Coordinator can be of any assistance, you can call ARAVIND HancockCCC at [phone].         If you have any questions or concerns, please don't hesitate to call.    Sincerely,        Josh Gifford MD

## 2018-11-03 ENCOUNTER — TELEPHONE (OUTPATIENT)
Dept: FAMILY MEDICINE | Facility: CLINIC | Age: 53
End: 2018-11-03

## 2018-11-03 NOTE — TELEPHONE ENCOUNTER
Please contact patient to have him schedule a follow-up appointment along with obtaining his labs prior after an overnight fast of at least 8 hr.  He has been past due for his follow-up since 08/31/2018; patient last seen in May of 2018.

## 2018-12-03 RX ORDER — DIAZEPAM 5 MG/1
TABLET ORAL
Qty: 180 TABLET | Refills: 0 | Status: SHIPPED | OUTPATIENT
Start: 2018-12-03 | End: 2019-01-07 | Stop reason: SDUPTHER

## 2019-01-01 RX ORDER — FLUOXETINE HYDROCHLORIDE 20 MG/1
20 CAPSULE ORAL EVERY MORNING
Qty: 90 CAPSULE | Refills: 0 | Status: SHIPPED | OUTPATIENT
Start: 2019-01-01 | End: 2019-05-24 | Stop reason: SDUPTHER

## 2019-01-01 RX ORDER — DIVALPROEX SODIUM 500 MG/1
TABLET, DELAYED RELEASE ORAL
Qty: 450 TABLET | Refills: 0 | Status: SHIPPED | OUTPATIENT
Start: 2019-01-01 | End: 2019-05-24 | Stop reason: SDUPTHER

## 2019-01-07 ENCOUNTER — OFFICE VISIT (OUTPATIENT)
Dept: PSYCHIATRY | Facility: CLINIC | Age: 54
End: 2019-01-07
Payer: MEDICARE

## 2019-01-07 VITALS
DIASTOLIC BLOOD PRESSURE: 84 MMHG | HEART RATE: 86 BPM | BODY MASS INDEX: 37.62 KG/M2 | SYSTOLIC BLOOD PRESSURE: 144 MMHG | WEIGHT: 254.75 LBS

## 2019-01-07 DIAGNOSIS — F41.1 ANXIETY STATE: ICD-10-CM

## 2019-01-07 DIAGNOSIS — F31.31 BIPOLAR 1 DISORDER, DEPRESSED, MILD: ICD-10-CM

## 2019-01-07 DIAGNOSIS — Z79.899 ENCOUNTER FOR LONG-TERM (CURRENT) USE OF MEDICATIONS: Primary | ICD-10-CM

## 2019-01-07 PROCEDURE — 3079F DIAST BP 80-89 MM HG: CPT | Mod: CPTII,S$GLB,, | Performed by: PSYCHIATRY & NEUROLOGY

## 2019-01-07 PROCEDURE — 99214 OFFICE O/P EST MOD 30 MIN: CPT | Mod: S$GLB,,, | Performed by: PSYCHIATRY & NEUROLOGY

## 2019-01-07 PROCEDURE — 3008F PR BODY MASS INDEX (BMI) DOCUMENTED: ICD-10-PCS | Mod: CPTII,S$GLB,, | Performed by: PSYCHIATRY & NEUROLOGY

## 2019-01-07 PROCEDURE — 3008F BODY MASS INDEX DOCD: CPT | Mod: CPTII,S$GLB,, | Performed by: PSYCHIATRY & NEUROLOGY

## 2019-01-07 PROCEDURE — 90833 PR PSYCHOTHERAPY W/PATIENT W/E&M, 30 MIN (ADD ON): ICD-10-PCS | Mod: ,,, | Performed by: PSYCHIATRY & NEUROLOGY

## 2019-01-07 PROCEDURE — 90833 PSYTX W PT W E/M 30 MIN: CPT | Mod: ,,, | Performed by: PSYCHIATRY & NEUROLOGY

## 2019-01-07 PROCEDURE — 3079F PR MOST RECENT DIASTOLIC BLOOD PRESSURE 80-89 MM HG: ICD-10-PCS | Mod: CPTII,S$GLB,, | Performed by: PSYCHIATRY & NEUROLOGY

## 2019-01-07 PROCEDURE — 3077F PR MOST RECENT SYSTOLIC BLOOD PRESSURE >= 140 MM HG: ICD-10-PCS | Mod: CPTII,S$GLB,, | Performed by: PSYCHIATRY & NEUROLOGY

## 2019-01-07 PROCEDURE — 99999 PR PBB SHADOW E&M-EST. PATIENT-LVL II: ICD-10-PCS | Mod: PBBFAC,,, | Performed by: PSYCHIATRY & NEUROLOGY

## 2019-01-07 PROCEDURE — 99214 PR OFFICE/OUTPT VISIT, EST, LEVL IV, 30-39 MIN: ICD-10-PCS | Mod: S$GLB,,, | Performed by: PSYCHIATRY & NEUROLOGY

## 2019-01-07 PROCEDURE — 3077F SYST BP >= 140 MM HG: CPT | Mod: CPTII,S$GLB,, | Performed by: PSYCHIATRY & NEUROLOGY

## 2019-01-07 PROCEDURE — 99999 PR PBB SHADOW E&M-EST. PATIENT-LVL II: CPT | Mod: PBBFAC,,, | Performed by: PSYCHIATRY & NEUROLOGY

## 2019-01-07 RX ORDER — QUETIAPINE FUMARATE 300 MG/1
300 TABLET, FILM COATED ORAL NIGHTLY
Qty: 90 TABLET | Refills: 0 | Status: SHIPPED | OUTPATIENT
Start: 2019-01-07 | End: 2019-04-09 | Stop reason: SDUPTHER

## 2019-01-07 RX ORDER — DIAZEPAM 5 MG/1
5 TABLET ORAL 2 TIMES DAILY PRN
Qty: 60 TABLET | Refills: 3 | Status: SHIPPED | OUTPATIENT
Start: 2019-01-07 | End: 2019-05-24 | Stop reason: SDUPTHER

## 2019-01-07 NOTE — PROGRESS NOTES
"Outpatient Psychiatry Follow-Up Visit (MD/NP)    1/7/2019    Clinical Status of Patient:  Outpatient (Ambulatory)    Chief Complaint:  Mike Barcenas Jr. is a 53 y.o. male who presents today for follow-up of bipolar disorder and anxiety  Met with patient.      Interval History and Content of Current Session:  Interim Events/Subjective Report/Content of Current Session:  53 y.o. male with a history of several years of treatment for depression and comorbid substance issues with a lot of irritability and behavioral problems.  Switched to bipolar regime in 2009 and did 2 weeks at [facility].  Since then much more stable.      Pt reports he is doing fairly well.  Pain however is not improved and he is having to give up some of his gardening.  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  He denies sadness or irritability.  He sleeps pretty well.  No problems with appetite.  Denies having had SI.  Concentration is good.  Foucs s "very good."  Memory is "fair".      Denies significaant anxiety.          Today the patient reports he is not doing well.  An attempt to treat his pain he smoked marijuana resulting in a positive drug screen his pain clinic and no longer being prescribed opiates.  He has been prescribed other medications including gabapentin however is unable to tolerate a therapeutic dose of this medication due to sedation.  He is taking it only at night.  He is much less active because he is unable to engage in his gardening activities due to pain and because medication makes him feel lazy.  Additionally he has attempted to take Valium more frequently, 3 times daily, in order to treat pain with minimal improvement.  Injections and physical therapy have not improved the situation.  He is hopeful that an appointment with neurosurgery this week will provide him some hope.    He denies suicidal ideation.  He is eating adequately.  He is sleeping " adequately.    Reviewed most recent labs        Psychotherapy:    · Target symptoms: anxiety , mood swings  · Why chosen therapy is appropriate versus another modality: relevant to diagnosis  · Outcome monitoring methods: self-report, observation  · Therapeutic intervention type: supportive psychotherapy  · Topics discussed/themes: stress related to medical comorbidities, building skills sets for symptom management, symptom recognition, life stage transitional issues,   · The patient's response to the intervention is accepting. The patient's progress toward treatment goals is good.   · Duration of intervention: 16 minutes    Review of Systems   · PSYCHIATRIC: Pertinant items are noted in the narrative.  · CONSTITUTIONAL: Positive for weight gain.   · GENITOURINARY: Positive for dysuria    Past Medical, Family and Social History: The patient's past medical, family and social history have been reviewed and updated as appropriate within the electronic medical record - see encounter notes.    seroquel 300mg qhs   Valium 5 mg bid prn  Prozac 20mg daily  depakote 1000mg qam and 1500mg qhs       Wt Readings from Last 3 Encounters:   09/24/18 110.1 kg (242 lb 9.9 oz)   08/08/18 88.9 kg (195 lb 15.8 oz)   06/26/18 88.9 kg (196 lb)     Lab Results   Component Value Date    VALPROATE 90.1 08/28/2018     Lab Results   Component Value Date     (L) 08/28/2018     Lab Results   Component Value Date    ALT 6 (L) 08/28/2018    AST 17 08/28/2018    ALKPHOS 55 08/28/2018    BILITOT 0.5 08/28/2018     Compliance: yes  Side effects: None     Risk Parameters:  Patient reports no suicidal ideation  Patient reports no homicidal ideation  Patient reports no self-injurious behavior  Patient reports no violent behavior    Exam (detailed: at least 9 elements; comprehensive: all 15 elements)   Constitutional  Vitals:  Most recent vital signs, dated less than 90 days prior to this appointment, were reviewed.    Vitals:    01/07/19 1025    BP: (!) 144/84   Pulse: 86   Weight: 115.5 kg (254 lb 11.9 oz)        General:  age appropriate, casually dressed, neatly groomed     Musculoskeletal  Muscle Strength/Tone:  no dyskinesia, no tremor   Gait & Station:  non-ataxic   AIMS completed  Psychiatric  Speech:  not pressured or loud but clearly audible   Mood:  Affect:  euthymic  appropriate, full, midline   Thought Process:  goal-directed, logical   Associations:  intact   Thought Content:  normal, no suicidality, no homicidality, delusions, or paranoia   Insight:  has awareness of illness   Judgement: behavior is adequate to circumstances   Orientation:  grossly intact   Memory: intact for content of interview   Language: grossly intact   Attention Span & Concentration:  able to focus   Fund of Knowledge:  intact and appropriate to age and level of education     Assessment and Diagnosis   Status/Progress: Based on the examination today, the patient's problem(s) is/are adequately but not ideally controlled.  New problems have not been presented today .   Comorbidities are not complicating management of the primary condition.  There are no active rule-out diagnoses for this patient at this time.    General Impression:  53 y.o. with bipolar disorder and anxiety currently reasonably well controlled.  He is reducing benzodiazepine use.             Diagnosis:  Bipolar I disorder MRE depressed mild  Anxiety D/O NOS      Intervention/Counseling/Treatment Plan   · Continue Seroquel 300 mg nightly.  · Continue to reduce vailum use  · Continue fluoxetine 20mg, depakote 1000 mg am and 1500 mg pm      Return to Clinic: 3 months

## 2019-01-09 ENCOUNTER — LAB VISIT (OUTPATIENT)
Dept: LAB | Facility: HOSPITAL | Age: 54
End: 2019-01-09
Attending: PSYCHIATRY & NEUROLOGY
Payer: MEDICARE

## 2019-01-09 DIAGNOSIS — Z79.899 ENCOUNTER FOR LONG-TERM (CURRENT) USE OF MEDICATIONS: ICD-10-CM

## 2019-01-09 LAB
ALBUMIN SERPL BCP-MCNC: 3.8 G/DL
ALP SERPL-CCNC: 61 U/L
ALT SERPL W/O P-5'-P-CCNC: 11 U/L
ANION GAP SERPL CALC-SCNC: 11 MMOL/L
AST SERPL-CCNC: 30 U/L
BASOPHILS # BLD AUTO: 0.07 K/UL
BASOPHILS NFR BLD: 1.1 %
BILIRUB SERPL-MCNC: 0.4 MG/DL
BUN SERPL-MCNC: 12 MG/DL
CALCIUM SERPL-MCNC: 9 MG/DL
CHLORIDE SERPL-SCNC: 100 MMOL/L
CHOLEST SERPL-MCNC: 260 MG/DL
CHOLEST/HDLC SERPL: 6 {RATIO}
CO2 SERPL-SCNC: 29 MMOL/L
CREAT SERPL-MCNC: 0.7 MG/DL
DIFFERENTIAL METHOD: ABNORMAL
EOSINOPHIL # BLD AUTO: 0.1 K/UL
EOSINOPHIL NFR BLD: 2.1 %
ERYTHROCYTE [DISTWIDTH] IN BLOOD BY AUTOMATED COUNT: 12.6 %
EST. GFR  (AFRICAN AMERICAN): >60 ML/MIN/1.73 M^2
EST. GFR  (NON AFRICAN AMERICAN): >60 ML/MIN/1.73 M^2
ESTIMATED AVG GLUCOSE: 146 MG/DL
GLUCOSE SERPL-MCNC: 156 MG/DL
HBA1C MFR BLD HPLC: 6.7 %
HCT VFR BLD AUTO: 45.3 %
HDLC SERPL-MCNC: 43 MG/DL
HDLC SERPL: 16.5 %
HGB BLD-MCNC: 14.9 G/DL
IMM GRANULOCYTES # BLD AUTO: 0.07 K/UL
IMM GRANULOCYTES NFR BLD AUTO: 1.1 %
LDLC SERPL CALC-MCNC: 171 MG/DL
LYMPHOCYTES # BLD AUTO: 3.2 K/UL
LYMPHOCYTES NFR BLD: 51.7 %
MCH RBC QN AUTO: 32.5 PG
MCHC RBC AUTO-ENTMCNC: 32.9 G/DL
MCV RBC AUTO: 99 FL
MONOCYTES # BLD AUTO: 0.6 K/UL
MONOCYTES NFR BLD: 8.9 %
NEUTROPHILS # BLD AUTO: 2.2 K/UL
NEUTROPHILS NFR BLD: 35.1 %
NONHDLC SERPL-MCNC: 217 MG/DL
NRBC BLD-RTO: 0 /100 WBC
PLATELET # BLD AUTO: 129 K/UL
PMV BLD AUTO: 11.5 FL
POTASSIUM SERPL-SCNC: 4.5 MMOL/L
PROT SERPL-MCNC: 6.9 G/DL
RBC # BLD AUTO: 4.59 M/UL
SODIUM SERPL-SCNC: 140 MMOL/L
TRIGL SERPL-MCNC: 230 MG/DL
VALPROATE SERPL-MCNC: 96.7 UG/ML
WBC # BLD AUTO: 6.17 K/UL

## 2019-01-09 PROCEDURE — 36415 COLL VENOUS BLD VENIPUNCTURE: CPT | Mod: PO

## 2019-01-09 PROCEDURE — 80164 ASSAY DIPROPYLACETIC ACD TOT: CPT

## 2019-01-09 PROCEDURE — 80061 LIPID PANEL: CPT

## 2019-01-09 PROCEDURE — 80053 COMPREHEN METABOLIC PANEL: CPT

## 2019-01-09 PROCEDURE — 85025 COMPLETE CBC W/AUTO DIFF WBC: CPT

## 2019-01-09 PROCEDURE — 83036 HEMOGLOBIN GLYCOSYLATED A1C: CPT

## 2019-03-04 ENCOUNTER — PATIENT MESSAGE (OUTPATIENT)
Dept: PSYCHIATRY | Facility: CLINIC | Age: 54
End: 2019-03-04

## 2019-04-09 ENCOUNTER — PATIENT MESSAGE (OUTPATIENT)
Dept: PSYCHIATRY | Facility: CLINIC | Age: 54
End: 2019-04-09

## 2019-04-09 DIAGNOSIS — F31.31 BIPOLAR 1 DISORDER, DEPRESSED, MILD: ICD-10-CM

## 2019-04-09 RX ORDER — QUETIAPINE FUMARATE 300 MG/1
300 TABLET, FILM COATED ORAL NIGHTLY
Qty: 90 TABLET | Refills: 0 | Status: SHIPPED | OUTPATIENT
Start: 2019-04-09 | End: 2019-05-24 | Stop reason: SDUPTHER

## 2019-05-24 ENCOUNTER — OFFICE VISIT (OUTPATIENT)
Dept: PSYCHIATRY | Facility: CLINIC | Age: 54
End: 2019-05-24
Payer: MEDICARE

## 2019-05-24 VITALS
HEART RATE: 74 BPM | SYSTOLIC BLOOD PRESSURE: 140 MMHG | DIASTOLIC BLOOD PRESSURE: 75 MMHG | HEIGHT: 69 IN | WEIGHT: 233 LBS | BODY MASS INDEX: 34.51 KG/M2

## 2019-05-24 DIAGNOSIS — F31.76 BIPOLAR 1 DISORDER, DEPRESSED, FULL REMISSION: ICD-10-CM

## 2019-05-24 DIAGNOSIS — F41.1 ANXIETY STATE: Primary | ICD-10-CM

## 2019-05-24 DIAGNOSIS — Z79.899 ENCOUNTER FOR LONG-TERM (CURRENT) USE OF MEDICATIONS: ICD-10-CM

## 2019-05-24 PROCEDURE — 99999 PR PBB SHADOW E&M-EST. PATIENT-LVL II: ICD-10-PCS | Mod: PBBFAC,,, | Performed by: PSYCHIATRY & NEUROLOGY

## 2019-05-24 PROCEDURE — 99499 RISK ADDL DX/OHS AUDIT: ICD-10-PCS | Mod: S$GLB,,, | Performed by: PSYCHIATRY & NEUROLOGY

## 2019-05-24 PROCEDURE — 3077F PR MOST RECENT SYSTOLIC BLOOD PRESSURE >= 140 MM HG: ICD-10-PCS | Mod: CPTII,S$GLB,, | Performed by: PSYCHIATRY & NEUROLOGY

## 2019-05-24 PROCEDURE — 99499 UNLISTED E&M SERVICE: CPT | Mod: S$GLB,,, | Performed by: PSYCHIATRY & NEUROLOGY

## 2019-05-24 PROCEDURE — 3077F SYST BP >= 140 MM HG: CPT | Mod: CPTII,S$GLB,, | Performed by: PSYCHIATRY & NEUROLOGY

## 2019-05-24 PROCEDURE — 3078F PR MOST RECENT DIASTOLIC BLOOD PRESSURE < 80 MM HG: ICD-10-PCS | Mod: CPTII,S$GLB,, | Performed by: PSYCHIATRY & NEUROLOGY

## 2019-05-24 PROCEDURE — 99214 PR OFFICE/OUTPT VISIT, EST, LEVL IV, 30-39 MIN: ICD-10-PCS | Mod: S$GLB,,, | Performed by: PSYCHIATRY & NEUROLOGY

## 2019-05-24 PROCEDURE — 3008F PR BODY MASS INDEX (BMI) DOCUMENTED: ICD-10-PCS | Mod: CPTII,S$GLB,, | Performed by: PSYCHIATRY & NEUROLOGY

## 2019-05-24 PROCEDURE — 99214 OFFICE O/P EST MOD 30 MIN: CPT | Mod: S$GLB,,, | Performed by: PSYCHIATRY & NEUROLOGY

## 2019-05-24 PROCEDURE — 99999 PR PBB SHADOW E&M-EST. PATIENT-LVL II: CPT | Mod: PBBFAC,,, | Performed by: PSYCHIATRY & NEUROLOGY

## 2019-05-24 PROCEDURE — 3008F BODY MASS INDEX DOCD: CPT | Mod: CPTII,S$GLB,, | Performed by: PSYCHIATRY & NEUROLOGY

## 2019-05-24 PROCEDURE — 3078F DIAST BP <80 MM HG: CPT | Mod: CPTII,S$GLB,, | Performed by: PSYCHIATRY & NEUROLOGY

## 2019-05-24 RX ORDER — DIAZEPAM 5 MG/1
5 TABLET ORAL 2 TIMES DAILY PRN
Qty: 60 TABLET | Refills: 3 | Status: SHIPPED | OUTPATIENT
Start: 2019-05-24 | End: 2019-11-26 | Stop reason: SDUPTHER

## 2019-05-24 RX ORDER — QUETIAPINE FUMARATE 300 MG/1
300 TABLET, FILM COATED ORAL NIGHTLY
Qty: 90 TABLET | Refills: 0 | Status: SHIPPED | OUTPATIENT
Start: 2019-05-24 | End: 2019-09-03 | Stop reason: SDUPTHER

## 2019-05-24 RX ORDER — FLUOXETINE HYDROCHLORIDE 20 MG/1
20 CAPSULE ORAL EVERY MORNING
Qty: 90 CAPSULE | Refills: 0 | Status: SHIPPED | OUTPATIENT
Start: 2019-05-24 | End: 2019-08-19 | Stop reason: SDUPTHER

## 2019-05-24 RX ORDER — DIVALPROEX SODIUM 500 MG/1
TABLET, DELAYED RELEASE ORAL
Qty: 450 TABLET | Refills: 0 | Status: SHIPPED | OUTPATIENT
Start: 2019-05-24 | End: 2019-09-03 | Stop reason: SDUPTHER

## 2019-05-24 NOTE — PROGRESS NOTES
Outpatient Psychiatry Follow-Up Visit (MD/NP)    5/24/2019    Clinical Status of Patient:  Outpatient (Ambulatory)    Chief Complaint:  Mike Barcenas Jr. is a 53 y.o. male who presents today for follow-up of bipolar disorder and anxiety  Met with patient.      Interval History and Content of Current Session:  Interim Events/Subjective Report/Content of Current Session:  53 y.o. male with a history of several years of treatment for depression and comorbid substance issues with a lot of irritability and behavioral problems.  Switched to bipolar regime in 2009 and did 2 weeks at [facility].  Since then much more stable.      The patient reports he had back surgery on March 26th.  He has been told to expect a six-month recovery but he states he is already improving.  He feels he is doing great.  Shortly after the surgery however it was difficult.  He felt trapped and was having severe pain.  At that time he felt quite sad.  Now however he is sleeping well.  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  He continues to take Valium twice daily.  He denies a desire for death.    He denies symptoms consistent with deshaun including spending sprees, rapid speech, or sleepless nights.        Psychotherapy:    · Target symptoms: anxiety , mood swings  · Why chosen therapy is appropriate versus another modality: relevant to diagnosis  · Outcome monitoring methods: self-report, observation  · Therapeutic intervention type: supportive psychotherapy  · Topics discussed/themes: stress related to medical comorbidities, building skills sets for symptom management, symptom recognition, life stage transitional issues,   · The patient's response to the intervention is accepting. The patient's progress toward treatment goals is good.   · Duration of intervention: 16 minutes    Review of Systems   · PSYCHIATRIC: Pertinant items are noted in the narrative.  · CONSTITUTIONAL: Positive for weight loss.  "  · CARDIOVASCULAR: No tachycardia or chest pain.  · GASTROINTESTINAL: No nausea, vomiting, pain, constipation or diarrhea.  · GENITOURINARY: No frequency, dysuria or sexual dysfunction.    Past Medical, Family and Social History: The patient's past medical, family and social history have been reviewed and updated as appropriate within the electronic medical record - see encounter notes.    seroquel 300mg qhs   Valium 5 mg bid prn  Prozac 20mg daily  depakote 1000mg qam and 1500mg qhs       Wt Readings from Last 3 Encounters:   05/24/19 105.7 kg (233 lb 0.4 oz)   01/07/19 115.5 kg (254 lb 11.9 oz)   09/24/18 110.1 kg (242 lb 9.9 oz)     Lab Results   Component Value Date    VALPROATE 96.7 01/09/2019     Lab Results   Component Value Date     (L) 01/09/2019     Lab Results   Component Value Date    ALT 11 01/09/2019    AST 30 01/09/2019    ALKPHOS 61 01/09/2019    BILITOT 0.4 01/09/2019     Compliance: yes  Side effects: None     Risk Parameters:  Patient reports no suicidal ideation  Patient reports no homicidal ideation  Patient reports no self-injurious behavior  Patient reports no violent behavior    Exam (detailed: at least 9 elements; comprehensive: all 15 elements)   Constitutional  Vitals:  Most recent vital signs, dated less than 90 days prior to this appointment, were reviewed.    Vitals:    05/24/19 1002   BP: (!) 140/75   Pulse: 74   Weight: 105.7 kg (233 lb 0.4 oz)   Height: 5' 9" (1.753 m)        General:  age appropriate, casually dressed, neatly groomed     Musculoskeletal  Muscle Strength/Tone:  no dyskinesia, no tremor   Gait & Station:  non-ataxic   AIMS completed  Psychiatric  Speech:  not pressured or loud but clearly audible   Mood:  Affect:  euthymic  appropriate, full, midline   Thought Process:  goal-directed, logical   Associations:  intact   Thought Content:  normal, no suicidality, no homicidality, delusions, or paranoia   Insight:  has awareness of illness   Judgement: behavior is " adequate to circumstances   Orientation:  grossly intact   Memory: intact for content of interview   Language: grossly intact   Attention Span & Concentration:  able to focus   Fund of Knowledge:  intact and appropriate to age and level of education     Assessment and Diagnosis   Status/Progress: Based on the examination today, the patient's problem(s) is/are adequately but not ideally controlled.  New problems have not been presented today .   Comorbidities are not complicating management of the primary condition.  There are no active rule-out diagnoses for this patient at this time.    General Impression:  53 y.o. with bipolar disorder and anxiety currently reasonably well controlled.  He is reducing benzodiazepine use.             Diagnosis:  Bipolar I disorder MRE depressed in rem  Anxiety D/O NOS      Intervention/Counseling/Treatment Plan   · Continue Seroquel 300 mg nightly.  · Continue to reduce vailum use  · Continue fluoxetine 20mg, depakote 1000 mg am and 1500 mg pm      Return to Clinic: 3 months

## 2019-06-02 ENCOUNTER — PATIENT MESSAGE (OUTPATIENT)
Dept: PSYCHIATRY | Facility: CLINIC | Age: 54
End: 2019-06-02

## 2019-06-12 ENCOUNTER — LAB VISIT (OUTPATIENT)
Dept: LAB | Facility: HOSPITAL | Age: 54
End: 2019-06-12
Attending: PEDIATRICS
Payer: MEDICARE

## 2019-06-12 DIAGNOSIS — Z79.899 ENCOUNTER FOR LONG-TERM (CURRENT) USE OF MEDICATIONS: ICD-10-CM

## 2019-06-12 LAB
BASOPHILS # BLD AUTO: 0.03 K/UL (ref 0–0.2)
BASOPHILS NFR BLD: 0.4 % (ref 0–1.9)
DIFFERENTIAL METHOD: ABNORMAL
EOSINOPHIL # BLD AUTO: 0.1 K/UL (ref 0–0.5)
EOSINOPHIL NFR BLD: 1.1 % (ref 0–8)
ERYTHROCYTE [DISTWIDTH] IN BLOOD BY AUTOMATED COUNT: 13.2 % (ref 11.5–14.5)
HCT VFR BLD AUTO: 42.3 % (ref 40–54)
HGB BLD-MCNC: 14.2 G/DL (ref 14–18)
LYMPHOCYTES # BLD AUTO: 2.9 K/UL (ref 1–4.8)
LYMPHOCYTES NFR BLD: 40.8 % (ref 18–48)
MCH RBC QN AUTO: 31.1 PG (ref 27–31)
MCHC RBC AUTO-ENTMCNC: 33.6 G/DL (ref 32–36)
MCV RBC AUTO: 93 FL (ref 82–98)
MONOCYTES # BLD AUTO: 0.7 K/UL (ref 0.3–1)
MONOCYTES NFR BLD: 9.6 % (ref 4–15)
NEUTROPHILS # BLD AUTO: 3.5 K/UL (ref 1.8–7.7)
NEUTROPHILS NFR BLD: 48.1 % (ref 38–73)
PLATELET # BLD AUTO: 129 K/UL (ref 150–350)
PMV BLD AUTO: 11.8 FL (ref 9.2–12.9)
RBC # BLD AUTO: 4.57 M/UL (ref 4.6–6.2)
VALPROATE SERPL-MCNC: 97.3 UG/ML (ref 50–100)
WBC # BLD AUTO: 7.21 K/UL (ref 3.9–12.7)

## 2019-06-12 PROCEDURE — 36415 COLL VENOUS BLD VENIPUNCTURE: CPT | Mod: PO

## 2019-06-12 PROCEDURE — 80164 ASSAY DIPROPYLACETIC ACD TOT: CPT

## 2019-06-12 PROCEDURE — 85025 COMPLETE CBC W/AUTO DIFF WBC: CPT | Mod: PO

## 2019-08-15 ENCOUNTER — PES CALL (OUTPATIENT)
Dept: ADMINISTRATIVE | Facility: CLINIC | Age: 54
End: 2019-08-15

## 2019-08-20 RX ORDER — FLUOXETINE HYDROCHLORIDE 20 MG/1
CAPSULE ORAL
Qty: 90 CAPSULE | Refills: 0 | Status: SHIPPED | OUTPATIENT
Start: 2019-08-20 | End: 2019-09-03 | Stop reason: SDUPTHER

## 2019-09-03 ENCOUNTER — OFFICE VISIT (OUTPATIENT)
Dept: PSYCHIATRY | Facility: CLINIC | Age: 54
End: 2019-09-03
Payer: MEDICARE

## 2019-09-03 VITALS
HEART RATE: 77 BPM | DIASTOLIC BLOOD PRESSURE: 55 MMHG | SYSTOLIC BLOOD PRESSURE: 109 MMHG | BODY MASS INDEX: 28.78 KG/M2 | WEIGHT: 194.88 LBS

## 2019-09-03 DIAGNOSIS — F31.76 BIPOLAR 1 DISORDER, DEPRESSED, FULL REMISSION: ICD-10-CM

## 2019-09-03 DIAGNOSIS — F31.9 BIPOLAR I DISORDER: Primary | ICD-10-CM

## 2019-09-03 PROBLEM — F31.31 BIPOLAR AFFECTIVE DISORDER, CURRENTLY DEPRESSED, MILD: Status: RESOLVED | Noted: 2018-03-01 | Resolved: 2019-09-03

## 2019-09-03 PROCEDURE — 90791 PSYCH DIAGNOSTIC EVALUATION: CPT | Mod: S$GLB,,, | Performed by: PSYCHIATRY & NEUROLOGY

## 2019-09-03 PROCEDURE — 90791 PR PSYCHIATRIC DIAGNOSTIC EVALUATION: ICD-10-PCS | Mod: S$GLB,,, | Performed by: PSYCHIATRY & NEUROLOGY

## 2019-09-03 PROCEDURE — 99999 PR PBB SHADOW E&M-EST. PATIENT-LVL II: CPT | Mod: PBBFAC,,, | Performed by: PSYCHIATRY & NEUROLOGY

## 2019-09-03 PROCEDURE — 99999 PR PBB SHADOW E&M-EST. PATIENT-LVL II: ICD-10-PCS | Mod: PBBFAC,,, | Performed by: PSYCHIATRY & NEUROLOGY

## 2019-09-03 RX ORDER — FLUOXETINE HYDROCHLORIDE 20 MG/1
20 CAPSULE ORAL EVERY MORNING
Qty: 90 CAPSULE | Refills: 1 | Status: SHIPPED | OUTPATIENT
Start: 2019-09-03 | End: 2020-05-21 | Stop reason: SDUPTHER

## 2019-09-03 RX ORDER — QUETIAPINE FUMARATE 300 MG/1
300 TABLET, FILM COATED ORAL NIGHTLY
Qty: 90 TABLET | Refills: 1 | Status: SHIPPED | OUTPATIENT
Start: 2019-09-03 | End: 2020-04-10

## 2019-09-03 RX ORDER — DIVALPROEX SODIUM 500 MG/1
TABLET, DELAYED RELEASE ORAL
Qty: 450 TABLET | Refills: 1 | Status: SHIPPED | OUTPATIENT
Start: 2019-09-03 | End: 2019-11-26 | Stop reason: SDUPTHER

## 2019-09-03 NOTE — PROGRESS NOTES
"Outpatient Psychiatry Initial Visit (MD/NP)    9/3/2019    Mike Barcenas Jr., a 54 y.o. male, for initial evaluation visit.  Patient is referred by [facility]       Reason for Encounter: Referral for treatment    Complaints:  He has been experiencing Intermittent mood changes. Patient has been treated for a mood disorder, most recently diagnosed as Bipolar Disorder. He has had one hospitalization for this problem in about 2009. He then attended a partial hospital program for additional stabilization,and denies further hospitalizations for this problem. He was upbeat today, cheerful,and in good self control. He is pleased that he has a new girlfriend and is enjoying [place] the [place]. He states he takes his medicines as scheduled and denies current substance abuse of any kind. He has a GED and is not working, having been declared disabled. I checked his recent valproate level and cbc, both of which are stable. He has no current signs or symptoms of deshaun or psychosis, though his speech was somewhat tangential and pressured during the interview. She smokes  1ppd. He is optimistic and pleased with his current situation. He is aware of his speech, reassuring me that, "I know I talk!".    Current Medications:  Medication List with Changes/Refills   Current Medications    AMLODIPINE (NORVASC) 10 MG TABLET    Take 1 tablet (10 mg total) by mouth once daily.    ASCORBIC ACID (VITAMIN C) 500 MG TABLET    Take 500 mg by mouth once daily.    ATORVASTATIN (LIPITOR) 40 MG TABLET    Take 1 tablet (40 mg total) by mouth once daily.    DIAZEPAM (VALIUM) 5 MG TABLET    Take 1 tablet (5 mg total) by mouth 2 (two) times daily as needed.    DOCOSAHEXANOIC ACID/EPA (FISH OIL ORAL)    Take by mouth.    FOLIC ACID (FOLVITE) 800 MCG TAB    Take 800 mcg by mouth once daily.     GABAPENTIN (NEURONTIN) 300 MG CAPSULE    Take 2 capsules (600 mg total) by mouth 3 (three) times daily.    ISOSORBIDE MONONITRATE " (IMDUR) 30 MG 24 HR TABLET    Take 2 tablets (60 mg total) by mouth once daily.    LISINOPRIL 10 MG TABLET    TAKE 1/2 TABLET BY MOUTH DAILY FOR BLOOD PRESSURE.    MULTIVITAMIN (THERAGRAN) PER TABLET        MULTIVITAMIN WITH MINERALS TABLET        VITAMIN B-12 50 MCG LOZG    Take 1 tablet by mouth once daily.     VITAMIN E 400 UNIT CAPSULE    Take 400 Units by mouth once daily.    Changed and/or Refilled Medications    Modified Medication Previous Medication    DIVALPROEX (DEPAKOTE) 500 MG TBEC divalproex (DEPAKOTE) 500 MG TbEC       TAKE 2 TABLETS EVERY MORNING AND TAKE 3 TABLETS AT BEDTIME    TAKE 2 TABLETS EVERY MORNING AND TAKE 3 TABLETS AT BEDTIME    FLUOXETINE 20 MG CAPSULE FLUoxetine 20 MG capsule       Take 1 capsule (20 mg total) by mouth every morning.    TAKE 1 CAPSULE EVERY MORNING    QUETIAPINE (SEROQUEL) 300 MG TAB QUEtiapine (SEROQUEL) 300 MG Tab       Take 1 tablet (300 mg total) by mouth every evening.    Take 1 tablet (300 mg total) by mouth every evening.        Stressors: Maintaining his mood stability.    History:     Past Psychiatric History:   Previous therapy: yes  Previous psychiatric treatment and medication trials: yes  Previous psychiatric hospitalizations: yes  Previous diagnoses: yes  Previous suicide attempts: no  History of violence: no  Currently in treatment with myself.  Education: high school diploma/GED  Other pertinent history: None  Depression screening was performed with standardized tool: Not Screened - Not Eligible/Appropriate    Substance Abuse History:  Recreational drugs: No current recreational drug use.  Use of alcohol: denied  Use of caffeine: Not questioned today.  Tobacco use: yes - 1ppd  Legal consequences of chemical use: no  Patient feels he ought to cut down on drinking and/or drug use: no  Patient has been annoyed by others criticizing his drinking or drug use: no  Patient has felt bad or guilty about drinking or drug use:no  Patient has had a drink or used  drugs as an eye opener first thing in the morning to steady nerves, get rid of a hangover or get the day started: no  Use of OTC medications:Not known    The following portions of the patient's history were reviewed and updated as appropriate: allergies, current medications, past family history, past medical history, past social history, past surgical history and problem list.    Record Review: moderate      Review Of Systems:     Medical Review Of Systems:  ROS     Psychiatric Review Of Systems:  Sleep: no  Appetite changes: no  Weight changes: yes Purposely lost weight due to health purposes. Now exercises regularly.  Energy: no  Interest/pleasure/anhedonia: no  Somatic symptoms: no  Libido: no  Anxiety/panic: no  Guilty/hopeless: no  Self-injurious behavior/risky behavior: no  Any drugs: no  Alcohol: no     Current Evaluation:       Mental Status Evaluation:  Appearance:  unremarkable, age appropriate, casually dressed, neatly groomed   Behavior:  normal, cooperative   Speech:  no latency; no press, pressured, spontaneous   Mood:  happy   Affect:  congruent and appropriate   Thought Process:  normal and logical   Thought Content:  normal, no suicidality, no homicidality, delusions, or paranoia   Sensorium:  grossly intact   Cognition:  grossly intact   Insight:  has awareness of illness   Judgment:  behavior is adequate to circumstances     SIGECAPS  Sleep:   Interest:   Guilt:   Energy:   Concentration:   Appetite:   Psychomotor agitation:   Suicidal intentions: no  Suicidal plan: no    Physical/Somatic Complaints  The patient lists: no physical complaints.    Functioning in Relationships:  Spouse/partner:   Peers:   Employers:       Assessment - Diagnosis - Goals:       ICD-10-CM ICD-9-CM   1. Bipolar I disorder F31.9 296.7   2. Bipolar 1 disorder, depressed, full remission F31.76 296.56       Treatment Goals:  Specify outcomes written in observable, behavioral terms:   Maintain mood stability    Treatment  Plan/Recommendations: No additional recommendations at this time.  Follow-up plan for depression was discussed with patient. Continue following medications: Seroquel 300mg qhs;Prozac 20mg daily;Depakote 1000mg in am, and 1500mg later in the day; Valium 5mg bid prn anxiety. I reviewed with him the side effects of his meds and that he could call if there was a concern re his medications or his clinical condition. He agreed for a return visit in three months. I will plan to recheck his Depakote level and CBC but other blood work if needed at that itmel.    Review with patient: Treatment plan reviewed with the patient.  Medication risks/benefit reviewed with the patient    Lamont Novak Jr

## 2019-10-14 RX ORDER — DIAZEPAM 5 MG/1
TABLET ORAL
Qty: 60 TABLET | OUTPATIENT
Start: 2019-10-14

## 2019-11-21 ENCOUNTER — PATIENT MESSAGE (OUTPATIENT)
Dept: PSYCHIATRY | Facility: CLINIC | Age: 54
End: 2019-11-21

## 2019-11-23 ENCOUNTER — PATIENT MESSAGE (OUTPATIENT)
Dept: PSYCHIATRY | Facility: CLINIC | Age: 54
End: 2019-11-23

## 2019-11-26 RX ORDER — DIAZEPAM 5 MG/1
5 TABLET ORAL 2 TIMES DAILY PRN
Qty: 60 TABLET | Refills: 1 | Status: SHIPPED | OUTPATIENT
Start: 2019-11-26 | End: 2019-12-17 | Stop reason: SDUPTHER

## 2019-11-26 RX ORDER — DIVALPROEX SODIUM 500 MG/1
TABLET, DELAYED RELEASE ORAL
Qty: 450 TABLET | Refills: 1 | Status: SHIPPED | OUTPATIENT
Start: 2019-11-26 | End: 2020-05-21 | Stop reason: SDUPTHER

## 2019-12-17 ENCOUNTER — OFFICE VISIT (OUTPATIENT)
Dept: PSYCHIATRY | Facility: CLINIC | Age: 54
End: 2019-12-17
Payer: MEDICARE

## 2019-12-17 DIAGNOSIS — F31.9 BIPOLAR I DISORDER: Primary | ICD-10-CM

## 2019-12-17 PROCEDURE — 99214 PR OFFICE/OUTPT VISIT, EST, LEVL IV, 30-39 MIN: ICD-10-PCS | Mod: S$GLB,,, | Performed by: PSYCHIATRY & NEUROLOGY

## 2019-12-17 PROCEDURE — 99214 OFFICE O/P EST MOD 30 MIN: CPT | Mod: S$GLB,,, | Performed by: PSYCHIATRY & NEUROLOGY

## 2019-12-17 RX ORDER — DIAZEPAM 5 MG/1
5 TABLET ORAL 2 TIMES DAILY PRN
Qty: 60 TABLET | Refills: 0 | Status: SHIPPED | OUTPATIENT
Start: 2019-12-17 | End: 2020-03-25 | Stop reason: SDUPTHER

## 2019-12-17 NOTE — PROGRESS NOTES
Outpatient Psychiatry Follow-Up Visit (MD/NP)    12/17/2019    Clinical Status of Patient:  Outpatient (Ambulatory)    Chief Complaint:  Mr Barcenas is a 54 y.o. male who presents today for follow-up of his bipolar disorder.     Met with patient at the  office.      Interval History and Content of Current Session:  General impression:Mr Barcenas currently has no signs or symptoms of deshaun or psychosis, but stated he gets depressed for about a day, and that this is related to a relationship with a female friend who came back into his life. He denies thoughts or intent to harm himself or others and is in good self control. He agrees to get his depakote level and related blood tests before his next visit. His thoughts were organized at this time and he is looking forward to the [place] season.    Target symptoms:Mood instability related to bipolar disorder.        Compliance:  Pt reports he is  taking medications as directed    Side effects:  None at this time.    Risk Parameters:  Patient reports no suicidal ideation  Patient reports no homicidal ideation  Patient reports no self-injurious behavior  Patient reports no violent behavior    Patient's Response to Intervention:  The patient's response to intervention is good.    Progress Toward Goals and Other Mental Status Changes:  The patient's progress toward goals is fair.    Vitals: Most recent vital signs, dated today were not taken due to late arrival of patient and hence not eviewed.     Mental Status Evaluation     Appearance:  Neatly dressed and groomed   Behavior:  cooperative   Speech:  Low toned but normal   Mood:  Stable at this time and voices no intent to harm self or others   Affect:  Consistent with mood   Thought Process:  logical   Thought Content:  organized   Sensorium:  Grossly intact   Attention Span & Concentration Good focus   Cognition:  Grossly intact   Insight:  fair   Judgment:  Appropriate to his condition         Diagnosis:    Bipolar I  disorder    Plan:(Medication and Therapy Recommendation)  · Continue Seroquel 300 mg q hs, Prozac 20 mg daily, Depakote 1000mg in am and 1500mg q hs, and Valium 5 mg bid prn. I reviewed the side effects of his medicines and advised he could call if he had problems with his medicines or his clinical condition. He also agreed to blood tests for depakote level, CBC, and liver profile.    Additional Notes: Supportive psychotherapy provided during this session.    Return to Clinic: 3 months

## 2020-01-08 ENCOUNTER — LAB VISIT (OUTPATIENT)
Dept: LAB | Facility: HOSPITAL | Age: 55
End: 2020-01-08
Attending: PSYCHIATRY & NEUROLOGY
Payer: MEDICARE

## 2020-01-08 DIAGNOSIS — F31.9 BIPOLAR I DISORDER: ICD-10-CM

## 2020-01-08 LAB
ALBUMIN SERPL BCP-MCNC: 4 G/DL (ref 3.5–5.2)
ALP SERPL-CCNC: 63 U/L (ref 55–135)
ALT SERPL W/O P-5'-P-CCNC: 5 U/L (ref 10–44)
AST SERPL-CCNC: 13 U/L (ref 10–40)
BASOPHILS # BLD AUTO: 0.05 K/UL (ref 0–0.2)
BASOPHILS NFR BLD: 0.9 % (ref 0–1.9)
BILIRUB DIRECT SERPL-MCNC: 0.1 MG/DL (ref 0.1–0.3)
BILIRUB SERPL-MCNC: 0.2 MG/DL (ref 0.1–1)
DIFFERENTIAL METHOD: ABNORMAL
EOSINOPHIL # BLD AUTO: 0.1 K/UL (ref 0–0.5)
EOSINOPHIL NFR BLD: 1.2 % (ref 0–8)
ERYTHROCYTE [DISTWIDTH] IN BLOOD BY AUTOMATED COUNT: 12.8 % (ref 11.5–14.5)
HCT VFR BLD AUTO: 49.7 % (ref 40–54)
HGB BLD-MCNC: 16 G/DL (ref 14–18)
IMM GRANULOCYTES # BLD AUTO: 0.02 K/UL (ref 0–0.04)
IMM GRANULOCYTES NFR BLD AUTO: 0.3 % (ref 0–0.5)
LYMPHOCYTES # BLD AUTO: 2.6 K/UL (ref 1–4.8)
LYMPHOCYTES NFR BLD: 45.3 % (ref 18–48)
MCH RBC QN AUTO: 32.3 PG (ref 27–31)
MCHC RBC AUTO-ENTMCNC: 32.2 G/DL (ref 32–36)
MCV RBC AUTO: 100 FL (ref 82–98)
MONOCYTES # BLD AUTO: 0.3 K/UL (ref 0.3–1)
MONOCYTES NFR BLD: 5.9 % (ref 4–15)
NEUTROPHILS # BLD AUTO: 2.7 K/UL (ref 1.8–7.7)
NEUTROPHILS NFR BLD: 46.4 % (ref 38–73)
NRBC BLD-RTO: 0 /100 WBC
PLATELET # BLD AUTO: 193 K/UL (ref 150–350)
PMV BLD AUTO: 11.1 FL (ref 9.2–12.9)
PROT SERPL-MCNC: 7.4 G/DL (ref 6–8.4)
RBC # BLD AUTO: 4.95 M/UL (ref 4.6–6.2)
VALPROATE SERPL-MCNC: 132.4 UG/ML (ref 50–100)
WBC # BLD AUTO: 5.78 K/UL (ref 3.9–12.7)

## 2020-01-08 PROCEDURE — 80076 HEPATIC FUNCTION PANEL: CPT

## 2020-01-08 PROCEDURE — 36415 COLL VENOUS BLD VENIPUNCTURE: CPT | Mod: PO

## 2020-01-08 PROCEDURE — 85025 COMPLETE CBC W/AUTO DIFF WBC: CPT

## 2020-01-08 PROCEDURE — 80164 ASSAY DIPROPYLACETIC ACD TOT: CPT

## 2020-01-09 NOTE — PROGRESS NOTES
I called patient today to discuss his blood tests, particularly the increase in the valproate level to 132. He denies today side effects from the depakote and stated he had taken the am depakote before he had the blood drawn which could account for the increase in level. He was very alert and stable during this phone conversation, and was upbeat and positive. We agreed to continue the current dose and to keep his appointment in March of this year at which time we will repeat the blood tests. He was also advised to call if he detects side effects from the depakote which potential side effects I reviewed with him at this time. He is in good self control at this time and not an active danger to himself or others.

## 2020-03-25 RX ORDER — DIAZEPAM 5 MG/1
5 TABLET ORAL 2 TIMES DAILY PRN
Qty: 60 TABLET | Refills: 0 | Status: SHIPPED | OUTPATIENT
Start: 2020-03-25 | End: 2020-05-21 | Stop reason: SDUPTHER

## 2020-04-09 DIAGNOSIS — F31.76 BIPOLAR 1 DISORDER, DEPRESSED, FULL REMISSION: ICD-10-CM

## 2020-04-10 RX ORDER — QUETIAPINE FUMARATE 300 MG/1
TABLET, FILM COATED ORAL
Qty: 90 TABLET | Refills: 1 | Status: SHIPPED | OUTPATIENT
Start: 2020-04-10 | End: 2020-05-21 | Stop reason: SDUPTHER

## 2020-05-21 ENCOUNTER — OFFICE VISIT (OUTPATIENT)
Dept: PSYCHIATRY | Facility: CLINIC | Age: 55
End: 2020-05-21
Payer: MEDICARE

## 2020-05-21 DIAGNOSIS — F31.9 BIPOLAR I DISORDER: Primary | ICD-10-CM

## 2020-05-21 DIAGNOSIS — F31.76 BIPOLAR 1 DISORDER, DEPRESSED, FULL REMISSION: ICD-10-CM

## 2020-05-21 PROCEDURE — 99214 OFFICE O/P EST MOD 30 MIN: CPT | Mod: S$GLB,,, | Performed by: PSYCHIATRY & NEUROLOGY

## 2020-05-21 PROCEDURE — 99999 PR PBB SHADOW E&M-EST. PATIENT-LVL I: CPT | Mod: PBBFAC,,, | Performed by: PSYCHIATRY & NEUROLOGY

## 2020-05-21 PROCEDURE — 99214 PR OFFICE/OUTPT VISIT, EST, LEVL IV, 30-39 MIN: ICD-10-PCS | Mod: S$GLB,,, | Performed by: PSYCHIATRY & NEUROLOGY

## 2020-05-21 PROCEDURE — 99999 PR PBB SHADOW E&M-EST. PATIENT-LVL I: ICD-10-PCS | Mod: PBBFAC,,, | Performed by: PSYCHIATRY & NEUROLOGY

## 2020-05-21 RX ORDER — DIAZEPAM 5 MG/1
5 TABLET ORAL 2 TIMES DAILY PRN
Qty: 60 TABLET | Refills: 0 | Status: SHIPPED | OUTPATIENT
Start: 2020-05-21 | End: 2020-06-17 | Stop reason: SDUPTHER

## 2020-05-21 RX ORDER — FLUOXETINE HYDROCHLORIDE 20 MG/1
20 CAPSULE ORAL EVERY MORNING
Qty: 90 CAPSULE | Refills: 1 | Status: SHIPPED | OUTPATIENT
Start: 2020-05-21 | End: 2020-05-21 | Stop reason: SDUPTHER

## 2020-05-21 RX ORDER — DIVALPROEX SODIUM 500 MG/1
TABLET, DELAYED RELEASE ORAL
Qty: 450 TABLET | Refills: 1 | Status: SHIPPED | OUTPATIENT
Start: 2020-05-21 | End: 2020-05-21 | Stop reason: SDUPTHER

## 2020-05-21 RX ORDER — FLUOXETINE HYDROCHLORIDE 20 MG/1
20 CAPSULE ORAL EVERY MORNING
Qty: 90 CAPSULE | Refills: 1 | Status: SHIPPED | OUTPATIENT
Start: 2020-05-21 | End: 2020-12-28

## 2020-05-21 RX ORDER — QUETIAPINE FUMARATE 300 MG/1
300 TABLET, FILM COATED ORAL NIGHTLY
Qty: 90 TABLET | Refills: 1 | Status: SHIPPED | OUTPATIENT
Start: 2020-05-21 | End: 2021-04-12

## 2020-05-21 RX ORDER — DIVALPROEX SODIUM 500 MG/1
TABLET, DELAYED RELEASE ORAL
Qty: 450 TABLET | Refills: 1 | Status: SHIPPED | OUTPATIENT
Start: 2020-05-21 | End: 2020-12-28

## 2020-05-21 NOTE — PROGRESS NOTES
"Outpatient Psychiatry Follow-Up Visit (MD/NP)    05/21/2020 Scheduled phone visit.(15")    Clinical Status of Patient:  Outpatient (Ambulatory)    Chief Complaint:  Visit is for patient's bipolar disorder    Interval History and Content of Current Session:Patient is doing quite well. He is in good self control and doing well handling the virus pandemic. He had recent labs with his PCP and all were stable. He has no side effects from the depakote or other meds at this time. Patient has no symptoms of deshaun or psychosis, nor of depression. He was upbeat and hopeful. He is enjoying visiting with his father on the [place] and takes drives with him at times.    Compliance:good.      Side effects:none  Musculoskeletal: patient has no abnormal motor movements of any kind at this time.      Risk Parameters:Not danger to self or others at this time.    Patient's Response to Intervention:accepting    Progress Toward Goals and Other Mental Status Changes:good     Vital signs this date were not reviewed.     Mental Status Evaluation     Appearance:  not assessed   Behavior:  cooperative                             Speech normal   Mood:  steady, euthymic   Affect:  euthymic   Thought Process:  linear, logical   Thought Content:  organizednormal   Sensorium:  alert and oriented to person, place, time and situation   Attention Span & Concentration able to focus   Cognition:  Grossly intactgrossly intact   Insight:  has awareness of illness   Judgment:  behavior is adequate to circumstances       Diagnosis:    Bipolar I disorder [F31.9]      I reviewed the side effects of the patient's medicines and advised a call if the patient had problems with the medicine or clinical condition.    Plan:(Medication and Therapy Recommendation)  Additional Notes: Patient agreed to continue Seroquel 300 mg q hs, Prozac 20 mg daily, Depakote 1000 mg q am, and 1500 mg q hs, and Valium 5 mg bid prn.  Return to Clinic:6 months  "

## 2020-08-30 RX ORDER — DIAZEPAM 5 MG/1
5 TABLET ORAL 2 TIMES DAILY
Qty: 60 TABLET | Refills: 0 | Status: SHIPPED | OUTPATIENT
Start: 2020-08-30 | End: 2020-10-20 | Stop reason: SDUPTHER

## 2020-10-20 ENCOUNTER — PATIENT MESSAGE (OUTPATIENT)
Dept: PSYCHIATRY | Facility: CLINIC | Age: 55
End: 2020-10-20

## 2020-10-20 RX ORDER — DIAZEPAM 5 MG/1
5 TABLET ORAL 2 TIMES DAILY
Qty: 60 TABLET | Refills: 0 | Status: SHIPPED | OUTPATIENT
Start: 2020-10-20 | End: 2020-12-11 | Stop reason: SDUPTHER

## 2020-10-20 RX ORDER — DIAZEPAM 5 MG/1
5 TABLET ORAL 2 TIMES DAILY
Qty: 60 TABLET | Refills: 0 | Status: SHIPPED | OUTPATIENT
Start: 2020-10-20 | End: 2020-10-20 | Stop reason: SDUPTHER

## 2020-10-20 NOTE — ADDENDUM NOTE
Addended by: OLVIN DOUGLASS on: 10/20/2020 01:53 PM     Modules accepted: Orders     DATE OF PROCEDURE:  03/17/2017    SURGEON:  Wild Sofia M.D. PRE-OPERATIVE DIAGNOSIS:  Left back lipoma. POST-OPERATIVE DIAGNOSIS:  Left back lipoma. PROCEDURE PERFORMED:  Excision of left back lipoma incision 4 cm in size, the  size lipoma 6 cm. ASSISTANT:  None. ANESTHESIA:  Monitored anesthesia care. ESTIMATED BLOOD LOSS:  Minimal.    COMPLICATIONS:  None. SPECIMEN:  Lipoma. DISPOSITION:  To Recovery in stable condition. INDICATIONS FOR PROCEDURE:  The patient is a 50-year-old female who presented to  the office complaining of a bulge and discomfort on her back that was slowly  growing and getting bigger. Clinically, there was a lipoma. Risks and benefits  of excision of lipoma were discussed in detail with the patient. All of her  questions were answered. She consented to the procedure. DESCRIPTION OF PROCEDURE:  The patient was brought to the operating room on  03/17/2017, placed in a right lateral decubitus position with her body  adequately padded, sedated by Anesthesia. Left back prepped and draped in  sterile fashion. A 4 cm incision was made over the palpable abnormality,  carried down through subcutaneous tissue with electrocautery, this was all done  after we injected local anesthetic. We then encountered the lipoma. We very  carefully with gentle blunt dissection and electrocautery freed the lipoma from  the surrounding tissue and sent to Pathology. We confirmed hemostasis of the  wound,  closed the subcutaneous space with 2-0 Vicryl, skin with 4-0 Vicryl, and Steri-  Strips. The patient tolerated the procedure well.         Vilma Hines M.D.      MANJEET/YFQI-#[id number]  DD:  03/17/2017 10:18:11      DT:  03/17/2017 12:04:37        [address]    REPORT OF OPERATION       Marco A Calzada  MRN#: [id number]  ROOM:  Merged with [facility]#: [[id number]] Reports         Electronically Signed On 03/17/2017 15:08  __________________________________________________   Herminia Muck

## 2020-12-11 ENCOUNTER — PATIENT MESSAGE (OUTPATIENT)
Dept: PSYCHIATRY | Facility: CLINIC | Age: 55
End: 2020-12-11

## 2020-12-11 RX ORDER — DIAZEPAM 5 MG/1
5 TABLET ORAL 2 TIMES DAILY
Qty: 60 TABLET | Refills: 0 | Status: SHIPPED | OUTPATIENT
Start: 2020-12-11 | End: 2021-02-04 | Stop reason: SDUPTHER

## 2020-12-11 NOTE — PROGRESS NOTES
Outpatient Psychiatry Follow-Up Visit (MD/NP)    6/11/2018    Clinical Status of Patient:  Outpatient (Ambulatory)    Chief Complaint:  Mike Barcenas Jr. is a 52 y.o. male who presents today for follow-up of bipolar disorder and anxiety  Met with patient.      Interval History and Content of Current Session:  Interim Events/Subjective Report/Content of Current Session:  52 y.o. male with a history of several years of treatment for depression and comorbid substance issues with a lot of irritability and behavioral problems.  Switched to bipolar regime in 2009 and [place] [facility] [place]  Since then much more stable.  No behavioral issues or substance issues.      Reports he is not doing well because Dr. Parker passed. The other doc in the office does not prescribe opiods.  He will see a pain management doctor on Friday and now has another PCP.      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  Denies crying but has been angry.  He denies desire for death.  He has gotten nervous with switching from task to task, losing track of what he is done and describes this as manic behavior.      Has done some spending.      He was able to wean down on the valium to a total of one tablet daily but has gone up to 2 tabs daily since Dr. Parker's death.     Reviewed CBC      Psychotherapy:    · Target symptoms: anxiety , mood swings  · Why chosen therapy is appropriate versus another modality: relevant to diagnosis  · Outcome monitoring methods: self-report, observation  · Therapeutic intervention type: supportive psychotherapy  · Topics discussed/themes: building skills sets for symptom management, symptom recognition, financial stressors, life stage transitional issues,   · The patient's response to the intervention is accepting. The patient's progress toward treatment goals is good.   · Duration of intervention: 18 minutes    Review of Systems   · PSYCHIATRIC: Pertinant items are noted in the narrative.  · CONSTITUTIONAL: Positive for  weight gain.   · RESPIRATORY: No shortness of breath.  · CARDIOVASCULAR: No tachycardia or chest pain.  · GASTROINTESTINAL: No nausea, vomiting, pain, constipation or diarrhea.    Past Medical, Family and Social History: The patient's past medical, family and social history have been reviewed and updated as appropriate within the electronic medical record - see encounter notes.    seroquel 400mg qhs   Valium 5 mg bid prn  Prozac 20mg daily  depakote 1000mg qam and 1500mg qhs       Wt Readings from Last 3 Encounters:   06/11/18 90.9 kg (200 lb 6.4 oz)   05/31/18 89.4 kg (197 lb 3.2 oz)   03/01/18 94.8 kg (208 lb 15.9 oz)     Lab Results   Component Value Date    VALPROATE 76.2 02/14/2018     Lab Results   Component Value Date     (L) 05/22/2018     Lab Results   Component Value Date    ALT 9 (L) 02/14/2018    ALT 10 02/14/2018    AST 28 02/14/2018    AST 29 02/14/2018    ALKPHOS 60 02/14/2018    ALKPHOS 63 02/14/2018    BILITOT 0.4 02/14/2018    BILITOT 0.5 02/14/2018     Compliance: yes  Side effects: None     Risk Parameters:  Patient reports no suicidal ideation  Patient reports no homicidal ideation  Patient reports no self-injurious behavior  Patient reports no violent behavior    Exam (detailed: at least 9 elements; comprehensive: all 15 elements)   Constitutional  Vitals:  Most recent vital signs, dated less than 90 days prior to this appointment, were reviewed.    Vitals:    06/11/18 1023   BP: (!) 140/72   Pulse: 64   Weight: 90.9 kg (200 lb 6.4 oz)        General:  age appropriate, casually dressed, neatly groomed     Musculoskeletal  Muscle Strength/Tone:  no dyskinesia, no tremor   Gait & Station:  non-ataxic   AIMS completed  Psychiatric  Speech:  not pressured or loud but clearly audible   Mood:  Affect:  angry  appropriate, sad   Thought Process:  goal-directed, logical   Associations:  intact   Thought Content:  normal, no suicidality, no homicidality, delusions, or paranoia   Insight:  has  awareness of illness   Judgement: behavior is adequate to circumstances   Orientation:  grossly intact   Memory: intact for content of interview   Language: grossly intact   Attention Span & Concentration:  able to focus   Fund of Knowledge:  intact and appropriate to age and level of education     Assessment and Diagnosis   Status/Progress: Based on the examination today, the patient's problem(s) is/are adequately but not ideally controlled.  New problems have not been presented today .   Comorbidities are not complicating management of the primary condition.  There are no active rule-out diagnoses for this patient at this time.    General Impression:  52 y.o. with bipolar disorder and anxiety currently reasonably well controlled.  He is reducing benzodiazepine use.             Diagnosis:  Bipolar I disorder MRE depressed mild  Anxiety D/O NOS      Intervention/Counseling/Treatment Plan   · Continue Seroquel 300 mg nightly.  · Continue to reduce vailum dose  · Continue fluoxetine 20mg, depakote 1000 mg am and 1500 mg pm  · Valproic acid, CMP CBC before next appt        Return to Clinic: 3 months   Quality 110: Preventive Care And Screening: Influenza Immunization: Influenza Immunization Administered during Influenza season Quality 154 Part B: Falls: Risk Screening (Should Be Reported With Measure 155.): Patient screened for future fall risk; documentation of no falls in the past year or only one fall without injury in the past year Quality 111:Pneumonia Vaccination Status For Older Adults: Pneumococcal Vaccination Administered Quality 155: Falls Plan Of Care: Falls plan of care documented Quality 154 Part A: Falls: Risk Assessment (Should Be Reported With Measure 155.): Falls risk assessment completed and documented in the past 12 months. Detail Level: Detailed

## 2020-12-16 ENCOUNTER — OFFICE VISIT (OUTPATIENT)
Dept: PSYCHIATRY | Facility: CLINIC | Age: 55
End: 2020-12-16
Payer: MEDICARE

## 2020-12-16 DIAGNOSIS — F31.9 BIPOLAR I DISORDER: Primary | ICD-10-CM

## 2020-12-16 PROCEDURE — 99214 OFFICE O/P EST MOD 30 MIN: CPT | Mod: 95,,, | Performed by: PSYCHIATRY & NEUROLOGY

## 2020-12-16 PROCEDURE — 99214 PR OFFICE/OUTPT VISIT, EST, LEVL IV, 30-39 MIN: ICD-10-PCS | Mod: 95,,, | Performed by: PSYCHIATRY & NEUROLOGY

## 2020-12-16 NOTE — PROGRESS NOTES
Outpatient Psychiatry Follow-Up Visit (MD/NP)    12/16/2020Phone visit. Patient could not get into virtual system.    Clinical Status of Patient:  Outpatient (Ambulatory)    Chief Complaint:  Mike Barcenas Jr. is a 55 y.o. male who presents today for follow-up of mood disorder.  Met with patient and no relatives present.      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 Patient denies problems with his medications. He is compliant with his med regimen. Patient has no signs of deshaun or psychosis. He is not depressed either, but more anxious due to his work pressure. Patient is in good self control despite his stress. He is being safe re the corona virus. He feels he deals well overall with the stress.    Psychotherapy:  · Target symptoms: mood disorder  · Why chosen therapy is appropriate versus another modality: relevant to diagnosis  · Outcome monitoring methods: self-report  · Therapeutic intervention type: supportive psychotherapy  · Topics discussed/themes: coping with his job stress and the amount of work required for his farm animals  · The patient's response to the intervention is accepting. The patient's progress toward treatment goals is good.   · Duration of intervention: 14 minutes.    Review of Systems   · PSYCHIATRIC: Pertinant items are noted in the narrative.    Past Medical, Family and Social History: The patient's past medical, family and social history have been reviewed and updated as appropriate within the electronic medical record - see encounter notes.    Compliance: yes    Side effects: None    Risk Parameters:  Patient reports no suicidal ideation  Patient reports no homicidal ideation  Patient reports no self-injurious behavior  Patient reports no violent behavior    Exam (detailed: at least 9 elements;  comprehensive: all 15 elements)   Constitutional  Vitals:  Most recent vital signs, dated less than 90 days prior to this appointment, were reviewed.   There were no vitals filed for this visit. Patient reports he has stable vital signs, but recently needed increase in his blood pressure medicine.     General:  unremarkable, age appropriate, could not assess his appearance     Musculoskeletal  Muscle Strength/Tone:  patient reports adequate muscle strenght and tone   Gait & Station:  non-ataxic     Psychiatric  Speech:  no latency; no press, spontaneous   Mood & Affect:  steady  congruent and appropriate   Thought Process:  normal and logical   Associations:  intact   Thought Content:  normal, no suicidality, no homicidality, delusions, or paranoia   Insight:  has awareness of illness   Judgement behavior is adequate to circumstances   Orientation:  grossly intact   Memory: intact for content of interview   Language: grossly intact   Attention Span & Concentration:  able to focus   Fund of Knowledge:  intact and appropriate to age and level of education     Assessment and Diagnosis   Status/Progress: Based on the examination today, the patient's problem(s) is/are adequately but not ideally controlled.  New problems have been presented today. Running his own poultry visit.  new business are complicating management of the primary condition.  The working differential for this patient includes Bipolar Disorder.     General Impression:  Patient focused on the stress of his new business. He has no symptoms of deshaun or psychosis and no thoughts of harming himself or others. He is compliant with his meds. He is satisfied with his current status.      ICD-10-CM ICD-9-CM   1. Bipolar I disorder  F31.9 296.7       Intervention/Counseling/Treatment Plan   · Medication Management: The risks and benefits of medication were discussed with the patient.    Patient agrees to continue: Seroquel 300 mg q hs, Prozac 20 mg daily,  Depakote 1000 mg q amand 1500 mg q hs, and Valium 5mg bid prn.      Return to Clinic: 4 months

## 2021-04-09 ENCOUNTER — PATIENT MESSAGE (OUTPATIENT)
Dept: PSYCHIATRY | Facility: CLINIC | Age: 56
End: 2021-04-09

## 2021-06-09 ENCOUNTER — OFFICE VISIT (OUTPATIENT)
Dept: PSYCHIATRY | Facility: CLINIC | Age: 56
End: 2021-06-09
Payer: MEDICARE

## 2021-06-09 DIAGNOSIS — F31.9 BIPOLAR I DISORDER: Primary | ICD-10-CM

## 2021-06-09 PROCEDURE — 99213 PR OFFICE/OUTPT VISIT, EST, LEVL III, 20-29 MIN: ICD-10-PCS | Mod: 95,,, | Performed by: PSYCHIATRY & NEUROLOGY

## 2021-06-09 PROCEDURE — 99213 OFFICE O/P EST LOW 20 MIN: CPT | Mod: 95,,, | Performed by: PSYCHIATRY & NEUROLOGY

## 2021-06-09 PROCEDURE — 90833 PR PSYCHOTHERAPY W/PATIENT W/E&M, 30 MIN (ADD ON): ICD-10-PCS | Mod: 95,,, | Performed by: PSYCHIATRY & NEUROLOGY

## 2021-06-09 PROCEDURE — 90833 PSYTX W PT W E/M 30 MIN: CPT | Mod: 95,,, | Performed by: PSYCHIATRY & NEUROLOGY

## 2021-06-09 RX ORDER — DIVALPROEX SODIUM 500 MG/1
TABLET, DELAYED RELEASE ORAL
Qty: 450 TABLET | Refills: 1 | Status: SHIPPED | OUTPATIENT
Start: 2021-06-09 | End: 2022-09-13 | Stop reason: SDUPTHER

## 2021-06-09 RX ORDER — FLUOXETINE HYDROCHLORIDE 20 MG/1
20 CAPSULE ORAL EVERY MORNING
Qty: 90 CAPSULE | Refills: 1 | Status: SHIPPED | OUTPATIENT
Start: 2021-06-09 | End: 2022-05-24

## 2021-06-23 ENCOUNTER — PATIENT MESSAGE (OUTPATIENT)
Dept: PSYCHIATRY | Facility: CLINIC | Age: 56
End: 2021-06-23

## 2021-12-21 ENCOUNTER — PATIENT MESSAGE (OUTPATIENT)
Dept: PSYCHIATRY | Facility: CLINIC | Age: 56
End: 2021-12-21
Payer: MEDICARE

## 2022-03-02 ENCOUNTER — LAB VISIT (OUTPATIENT)
Dept: LAB | Facility: HOSPITAL | Age: 57
End: 2022-03-02
Attending: PSYCHIATRY & NEUROLOGY
Payer: MEDICARE

## 2022-03-02 DIAGNOSIS — F31.9 BIPOLAR I DISORDER: ICD-10-CM

## 2022-03-02 LAB
ALBUMIN SERPL BCP-MCNC: 3.8 G/DL (ref 3.5–5.2)
ALP SERPL-CCNC: 58 U/L (ref 55–135)
ALT SERPL W/O P-5'-P-CCNC: <5 U/L (ref 10–44)
ANION GAP SERPL CALC-SCNC: 13 MMOL/L (ref 8–16)
AST SERPL-CCNC: 14 U/L (ref 10–40)
BASOPHILS # BLD AUTO: 0.06 K/UL (ref 0–0.2)
BASOPHILS NFR BLD: 0.9 % (ref 0–1.9)
BILIRUB SERPL-MCNC: 0.2 MG/DL (ref 0.1–1)
BUN SERPL-MCNC: 13 MG/DL (ref 6–20)
CALCIUM SERPL-MCNC: 9.2 MG/DL (ref 8.7–10.5)
CHLORIDE SERPL-SCNC: 105 MMOL/L (ref 95–110)
CO2 SERPL-SCNC: 25 MMOL/L (ref 23–29)
CREAT SERPL-MCNC: 0.7 MG/DL (ref 0.5–1.4)
DIFFERENTIAL METHOD: ABNORMAL
EOSINOPHIL # BLD AUTO: 0.1 K/UL (ref 0–0.5)
EOSINOPHIL NFR BLD: 1.4 % (ref 0–8)
ERYTHROCYTE [DISTWIDTH] IN BLOOD BY AUTOMATED COUNT: 13.2 % (ref 11.5–14.5)
EST. GFR  (AFRICAN AMERICAN): >60 ML/MIN/1.73 M^2
EST. GFR  (NON AFRICAN AMERICAN): >60 ML/MIN/1.73 M^2
GLUCOSE SERPL-MCNC: 123 MG/DL (ref 70–110)
HCT VFR BLD AUTO: 44.2 % (ref 40–54)
HGB BLD-MCNC: 14.4 G/DL (ref 14–18)
IMM GRANULOCYTES # BLD AUTO: 0.06 K/UL (ref 0–0.04)
IMM GRANULOCYTES NFR BLD AUTO: 0.9 % (ref 0–0.5)
LYMPHOCYTES # BLD AUTO: 2.9 K/UL (ref 1–4.8)
LYMPHOCYTES NFR BLD: 44.4 % (ref 18–48)
MCH RBC QN AUTO: 31.5 PG (ref 27–31)
MCHC RBC AUTO-ENTMCNC: 32.6 G/DL (ref 32–36)
MCV RBC AUTO: 97 FL (ref 82–98)
MONOCYTES # BLD AUTO: 0.4 K/UL (ref 0.3–1)
MONOCYTES NFR BLD: 6.8 % (ref 4–15)
NEUTROPHILS # BLD AUTO: 3 K/UL (ref 1.8–7.7)
NEUTROPHILS NFR BLD: 45.6 % (ref 38–73)
NRBC BLD-RTO: 0 /100 WBC
PLATELET # BLD AUTO: 168 K/UL (ref 150–450)
PMV BLD AUTO: 10.9 FL (ref 9.2–12.9)
POTASSIUM SERPL-SCNC: 4.3 MMOL/L (ref 3.5–5.1)
PROT SERPL-MCNC: 6.9 G/DL (ref 6–8.4)
RBC # BLD AUTO: 4.57 M/UL (ref 4.6–6.2)
SODIUM SERPL-SCNC: 143 MMOL/L (ref 136–145)
VALPROATE SERPL-MCNC: 108.3 UG/ML (ref 50–100)
WBC # BLD AUTO: 6.48 K/UL (ref 3.9–12.7)

## 2022-03-02 PROCEDURE — 80053 COMPREHEN METABOLIC PANEL: CPT | Performed by: PSYCHIATRY & NEUROLOGY

## 2022-03-02 PROCEDURE — 36415 COLL VENOUS BLD VENIPUNCTURE: CPT | Mod: PO | Performed by: PSYCHIATRY & NEUROLOGY

## 2022-03-02 PROCEDURE — 85025 COMPLETE CBC W/AUTO DIFF WBC: CPT | Performed by: PSYCHIATRY & NEUROLOGY

## 2022-03-02 PROCEDURE — 80164 ASSAY DIPROPYLACETIC ACD TOT: CPT | Performed by: PSYCHIATRY & NEUROLOGY

## 2022-03-04 ENCOUNTER — PATIENT MESSAGE (OUTPATIENT)
Dept: PSYCHIATRY | Facility: CLINIC | Age: 57
End: 2022-03-04
Payer: MEDICARE

## 2022-03-09 ENCOUNTER — OFFICE VISIT (OUTPATIENT)
Dept: PSYCHIATRY | Facility: CLINIC | Age: 57
End: 2022-03-09
Payer: MEDICARE

## 2022-03-09 DIAGNOSIS — F31.9 BIPOLAR I DISORDER: Primary | ICD-10-CM

## 2022-03-09 PROCEDURE — 1160F PR REVIEW ALL MEDS BY PRESCRIBER/CLIN PHARMACIST DOCUMENTED: ICD-10-PCS | Mod: CPTII,95,, | Performed by: PSYCHIATRY & NEUROLOGY

## 2022-03-09 PROCEDURE — 4010F ACE/ARB THERAPY RXD/TAKEN: CPT | Mod: CPTII,95,, | Performed by: PSYCHIATRY & NEUROLOGY

## 2022-03-09 PROCEDURE — 1159F MED LIST DOCD IN RCRD: CPT | Mod: CPTII,95,, | Performed by: PSYCHIATRY & NEUROLOGY

## 2022-03-09 PROCEDURE — 1159F PR MEDICATION LIST DOCUMENTED IN MEDICAL RECORD: ICD-10-PCS | Mod: CPTII,95,, | Performed by: PSYCHIATRY & NEUROLOGY

## 2022-03-09 PROCEDURE — 1160F RVW MEDS BY RX/DR IN RCRD: CPT | Mod: CPTII,95,, | Performed by: PSYCHIATRY & NEUROLOGY

## 2022-03-09 PROCEDURE — 99213 OFFICE O/P EST LOW 20 MIN: CPT | Mod: 95,,, | Performed by: PSYCHIATRY & NEUROLOGY

## 2022-03-09 PROCEDURE — 4010F PR ACE/ARB THEARPY RXD/TAKEN: ICD-10-PCS | Mod: CPTII,95,, | Performed by: PSYCHIATRY & NEUROLOGY

## 2022-03-09 PROCEDURE — 90833 PSYTX W PT W E/M 30 MIN: CPT | Mod: 95,,, | Performed by: PSYCHIATRY & NEUROLOGY

## 2022-03-09 PROCEDURE — 99213 PR OFFICE/OUTPT VISIT, EST, LEVL III, 20-29 MIN: ICD-10-PCS | Mod: 95,,, | Performed by: PSYCHIATRY & NEUROLOGY

## 2022-03-09 PROCEDURE — 90833 PR PSYCHOTHERAPY W/PATIENT W/E&M, 30 MIN (ADD ON): ICD-10-PCS | Mod: 95,,, | Performed by: PSYCHIATRY & NEUROLOGY

## 2022-03-09 NOTE — PROGRESS NOTES
"Outpatient Psychiatry Follow-Up Visit (MD/NP)    3/9/2022Phone visit. Patient was blocked by virtual system and could not enter the system. (16" with 2" on meds and 14 " for supportive counseling and update of history and mental status)    Clinical Status of Patient:  Outpatient (Ambulatory)    Chief Complaint:  Mike Barcenas Jr. is a 56 y.o. male who presents today for follow-up of mood disorder.  Met with patient and no relatives present for interview.      Interval History and Content of Current Session:  Interim Events/Subjective Report/Content of Current Session: Patient feels like things "are going OK". Patient feels his anxiety and mood issues are stable, thought he indicated some brief intervals of stress over the past few months and is pleased with his overall stability. Patient has no active thoughts of harming himself and no signs of deshaun or psychosis. Patient remains active and is pleased with his work. Patient is calm and in good self control. Patient is upbeat and hopeful at this time. We discussed my detention as well. I reported results of bloodwork to patient as well as his depakote level too. Patient has a good sense of enjoyment. Patient stated he "tries to make every day a good time". Patient also enjoyed a cruise last December.    Psychotherapy:  · Target symptoms: mood disorder  · Why chosen therapy is appropriate versus another modality: relevant to diagnosis, patient responds to this modality, evidence based practice  · Outcome monitoring methods: self-report  · Therapeutic intervention type: supportive psychotherapy  · Topics discussed/themes: mood and anxiety concerns  · The patient's response to the intervention is accepting. The patient's progress toward treatment goals is good.   · Duration of intervention: 16 minutes.    Review of Systems   · PSYCHIATRIC: Pertinant items are noted in the narrative.    Past Medical, Family and Social History: The patient's past medical, family and " social history have been reviewed and updated as appropriate within the electronic medical record - see encounter notes.    Compliance: yes    Side effects: None    Risk Parameters:  Patient reports no suicidal ideation  Patient reports no homicidal ideation  Patient reports no self-injurious behavior  Patient reports no violent behavior    Exam (detailed: at least 9 elements; comprehensive: all 15 elements)   Constitutional  Vitals:  Most recent vital signs, dated less than 90 days prior to this appointment, were reviewed.   There were no vitals filed for this visit. Patient reports stable vital signs     General:  unremarkable, age appropriate, could not assess appearance     Musculoskeletal  Muscle Strength/Tone:  patient reports adequate muscle strength and tone.   Gait & Station:  non-ataxic     Psychiatric  Speech:  no latency; no press, spontaneous   Mood & Affect:  steady  congruent and appropriate   Thought Process:  normal and logical   Associations:  intact   Thought Content:  normal, no suicidality, no homicidality, delusions, or paranoia   Insight:  has awareness of illness   Judgement: behavior is adequate to circumstances   Orientation:  grossly intact   Memory: intact for content of interview   Language: grossly intact   Attention Span & Concentration:  able to focus   Fund of Knowledge:  not tested     Assessment and Diagnosis   Status/Progress: Based on the examination today, the patient's problem(s) is/are well controlled.  New problems have not been presented today.   no new obstacles are not complicating management of the primary condition.  The working differential for this patient includes bipolar disorder.     General Impression: Patient is doing quite well and is pleased with the stability of his mood. Patient is upbeat and thinking positively and is very satisfied with his current lifestyle. Patient uses his medicine wisely and is aware of the addictive nature of Valium.      ICD-10-CM  ICD-9-CM   1. Bipolar I disorder  F31.9 296.7       Intervention/Counseling/Treatment Plan   · Medication Management: The risks and benefits of medication were discussed with the patient. Patient agrees to continue Seroquel 300 mg q hs, Prozac 20 mg daily, Depakote 1.0 gm q am and 1.5 gm q hs, and Valium 5 mg bid prn.      Return to Clinic: as scheduled with a new doctor

## 2022-08-19 ENCOUNTER — PATIENT MESSAGE (OUTPATIENT)
Dept: PSYCHIATRY | Facility: CLINIC | Age: 57
End: 2022-08-19
Payer: MEDICARE

## 2022-09-13 ENCOUNTER — OFFICE VISIT (OUTPATIENT)
Dept: PSYCHIATRY | Facility: CLINIC | Age: 57
End: 2022-09-13
Payer: MEDICARE

## 2022-09-13 DIAGNOSIS — F31.76 BIPOLAR 1 DISORDER, DEPRESSED, FULL REMISSION: ICD-10-CM

## 2022-09-13 PROCEDURE — 4010F PR ACE/ARB THEARPY RXD/TAKEN: ICD-10-PCS | Mod: CPTII,95,, | Performed by: NURSE PRACTITIONER

## 2022-09-13 PROCEDURE — 99214 OFFICE O/P EST MOD 30 MIN: CPT | Mod: 95,,, | Performed by: NURSE PRACTITIONER

## 2022-09-13 PROCEDURE — 4010F ACE/ARB THERAPY RXD/TAKEN: CPT | Mod: CPTII,95,, | Performed by: NURSE PRACTITIONER

## 2022-09-13 PROCEDURE — 99214 PR OFFICE/OUTPT VISIT, EST, LEVL IV, 30-39 MIN: ICD-10-PCS | Mod: 95,,, | Performed by: NURSE PRACTITIONER

## 2022-09-13 RX ORDER — QUETIAPINE FUMARATE 300 MG/1
300 TABLET, FILM COATED ORAL NIGHTLY
Qty: 90 TABLET | Refills: 1 | Status: SHIPPED | OUTPATIENT
Start: 2022-09-13 | End: 2023-03-03 | Stop reason: SDUPTHER

## 2022-09-13 RX ORDER — DIVALPROEX SODIUM 500 MG/1
TABLET, DELAYED RELEASE ORAL
Qty: 450 TABLET | Refills: 1 | Status: SHIPPED | OUTPATIENT
Start: 2022-09-13 | End: 2023-03-03 | Stop reason: SDUPTHER

## 2022-09-13 NOTE — PROGRESS NOTES
"  Outpatient Psychiatry Follow-Up Visit (MD/NP)     9/13/2022 Phone visit. Patient was blocked by virtual system and could not enter the system this visit.     Clinical Status of Patient:  Outpatient (Ambulatory)     Chief Complaint:  Mike Barcenas Jr. is a 56 y.o. male who presents today for follow-up of mood disorder.  Met with patient and no relatives present for interview.       Interval History and Content of Current Session:  Interim Events/Subjective Report/Content of Current Session: Patient presents as new patient to this provider but well established with [facility] since 2009. Noted as previously followed by Dr. Jj, Dr. Dang and most recently by Dr. Novak since 2019.     Since that time has been mostly stable with symptoms and has progressively had a decrease in dosage of diazepam from 15 mg TID to 10 mg TID to currently 5 mg BID prn.     Reports mood has been good, denies racing thoughts or destructive behaviors. Denies psychotic symptoms. Reports valium has been effective for "excited or manic" during the day. Currently cg to father, having health issues, and this has been stressful.      Psychotherapy:  Target symptoms: mood disorder  Why chosen therapy is appropriate versus another modality: relevant to diagnosis, patient responds to this modality, evidence based practice  Outcome monitoring methods: self-report  Therapeutic intervention type: supportive psychotherapy  Topics discussed/themes: mood and anxiety concerns  The patient's response to the intervention is accepting. The patient's progress toward treatment goals is good.   Duration of intervention: 16 minutes.     Review of Systems   PSYCHIATRIC: Pertinant items are noted in the narrative.     Past Medical, Family and Social History: The patient's past medical, family and social history have been reviewed and updated as appropriate within the electronic medical record - see encounter notes.     Compliance: yes     Side " effects: None     Risk Parameters:  Patient reports no suicidal ideation  Patient reports no homicidal ideation  Patient reports no self-injurious behavior  Patient reports no violent behavior     Exam (detailed: at least 9 elements; comprehensive: all 15 elements)   Constitutional  Vitals:  Most recent vital signs, dated less than 90 days prior to this appointment, were reviewed.   There were no vitals filed for this visit. Patient reports stable vital signs      General:  unremarkable, age appropriate, could not assess appearance      Musculoskeletal  Muscle Strength/Tone:  patient reports adequate muscle strength and tone.   Gait & Station:  non-ataxic      Psychiatric  Speech:  no latency; no press, spontaneous   Mood & Affect:  steady  congruent and appropriate   Thought Process:  normal and logical   Associations:  intact   Thought Content:  normal, no suicidality, no homicidality, delusions, or paranoia   Insight:  has awareness of illness   Judgement: behavior is adequate to circumstances   Orientation:  grossly intact   Memory: intact for content of interview   Language: grossly intact   Attention Span & Concentration:  able to focus   Fund of Knowledge:  not tested      Assessment and Diagnosis   Status/Progress: Based on the examination today, the patient's problem(s) is/are well controlled.  New problems have not been presented today.   no new obstacles are not complicating management of the primary condition.  The working differential for this patient includes bipolar disorder.      General Impression: Patient is doing quite well and is pleased with the stability of his mood. Patient is upbeat and thinking positively and is very satisfied with his current lifestyle. Patient uses his medicine wisely and is aware of the addictive nature of Valium.         ICD-10-CM ICD-9-CM   1. Bipolar I disorder  F31.9 296.7         Intervention/Counseling/Treatment Plan   Medication Management: The risks and benefits  of medication were discussed with the patient. Patient agrees to continue Seroquel 300 mg q hs, Prozac 20 mg daily, Depakote 1.0 gm q am and 1.5 gm q hs, and Valium 5 mg bid prn.    Review labs - VPA level, CBC, CMP in 3/2022, discussed to complete HbA1c, lipid panel, VPA.         Return to Clinic: 3 to 4 months

## 2022-09-30 ENCOUNTER — PATIENT MESSAGE (OUTPATIENT)
Dept: PSYCHIATRY | Facility: CLINIC | Age: 57
End: 2022-09-30
Payer: MEDICARE

## 2022-12-22 ENCOUNTER — PATIENT MESSAGE (OUTPATIENT)
Dept: PSYCHIATRY | Facility: CLINIC | Age: 57
End: 2022-12-22
Payer: MEDICARE

## 2022-12-23 RX ORDER — DIAZEPAM 5 MG/1
5 TABLET ORAL 2 TIMES DAILY
Qty: 60 TABLET | Refills: 2 | Status: SHIPPED | OUTPATIENT
Start: 2022-12-23 | End: 2023-03-22

## 2022-12-23 RX ORDER — DIAZEPAM 5 MG/1
5 TABLET ORAL 2 TIMES DAILY
Qty: 60 TABLET | Refills: 2 | OUTPATIENT
Start: 2022-12-23

## 2023-02-16 ENCOUNTER — PATIENT MESSAGE (OUTPATIENT)
Dept: PSYCHIATRY | Facility: CLINIC | Age: 58
End: 2023-02-16
Payer: MEDICARE

## 2023-02-22 ENCOUNTER — PATIENT MESSAGE (OUTPATIENT)
Dept: PSYCHIATRY | Facility: CLINIC | Age: 58
End: 2023-02-22
Payer: MEDICARE

## 2023-02-23 ENCOUNTER — PATIENT MESSAGE (OUTPATIENT)
Dept: PSYCHIATRY | Facility: CLINIC | Age: 58
End: 2023-02-23
Payer: MEDICARE

## 2023-03-03 ENCOUNTER — OFFICE VISIT (OUTPATIENT)
Dept: PSYCHIATRY | Facility: CLINIC | Age: 58
End: 2023-03-03
Payer: MEDICARE

## 2023-03-03 DIAGNOSIS — F31.76 BIPOLAR 1 DISORDER, DEPRESSED, FULL REMISSION: Primary | ICD-10-CM

## 2023-03-03 DIAGNOSIS — Z79.899 LONG TERM CURRENT USE OF ANTIPSYCHOTIC MEDICATION: ICD-10-CM

## 2023-03-03 PROCEDURE — 4010F PR ACE/ARB THEARPY RXD/TAKEN: ICD-10-PCS | Mod: CPTII,95,, | Performed by: NURSE PRACTITIONER

## 2023-03-03 PROCEDURE — 99214 OFFICE O/P EST MOD 30 MIN: CPT | Mod: 95,,, | Performed by: NURSE PRACTITIONER

## 2023-03-03 PROCEDURE — 99214 PR OFFICE/OUTPT VISIT, EST, LEVL IV, 30-39 MIN: ICD-10-PCS | Mod: 95,,, | Performed by: NURSE PRACTITIONER

## 2023-03-03 PROCEDURE — 4010F ACE/ARB THERAPY RXD/TAKEN: CPT | Mod: CPTII,95,, | Performed by: NURSE PRACTITIONER

## 2023-03-03 RX ORDER — QUETIAPINE FUMARATE 300 MG/1
300 TABLET, FILM COATED ORAL NIGHTLY
Qty: 90 TABLET | Refills: 1 | Status: SHIPPED | OUTPATIENT
Start: 2023-03-03 | End: 2023-09-21 | Stop reason: SDUPTHER

## 2023-03-03 RX ORDER — FLUOXETINE HYDROCHLORIDE 20 MG/1
20 CAPSULE ORAL EVERY MORNING
Qty: 90 CAPSULE | Refills: 3 | Status: SHIPPED | OUTPATIENT
Start: 2023-03-03 | End: 2023-09-21 | Stop reason: SDUPTHER

## 2023-03-03 RX ORDER — DIVALPROEX SODIUM 500 MG/1
TABLET, DELAYED RELEASE ORAL
Qty: 450 TABLET | Refills: 1 | Status: SHIPPED | OUTPATIENT
Start: 2023-03-03 | End: 2023-09-21 | Stop reason: SDUPTHER

## 2023-03-03 NOTE — PROGRESS NOTES
"Outpatient Psychiatry Follow-Up Visit (MD/NP)     9/13/2022 Phone visit. Patient was blocked by virtual system and could not enter the system this visit.     Clinical Status of Patient:  Outpatient (Ambulatory)     Chief Complaint:  Mike Barcenas Jr. is a 56 y.o. male who presents today for follow-up of mood disorder.  Met with patient and no relatives present for interview.       Interval History and Content of Current Session:  Interim Events/Subjective Report/Content of Current Session: Patient presents as follow up from previous visit about 6 months prior. Noted as previously followed by Dr. Jj, Dr. Dang and most recently by Dr. Novak since 2019.      Since that time has been mostly stable with symptoms and has progressively had a decrease in dosage of diazepam from 15 mg TID to 10 mg TID to currently 5 mg BID prn.      Reports mood has been good, denies racing thoughts or destructive behaviors. Denies psychotic symptoms. Reports valium has been effective for "excited or manic" during the day.      Psychotherapy:  Target symptoms: mood disorder  Why chosen therapy is appropriate versus another modality: relevant to diagnosis, patient responds to this modality, evidence based practice  Outcome monitoring methods: self-report  Therapeutic intervention type: supportive psychotherapy  Topics discussed/themes: mood and anxiety concerns  The patient's response to the intervention is accepting. The patient's progress toward treatment goals is good.   Duration of intervention: 16 minutes.     Review of Systems   PSYCHIATRIC: Pertinant items are noted in the narrative.     Past Medical, Family and Social History: The patient's past medical, family and social history have been reviewed and updated as appropriate within the electronic medical record - see encounter notes.     Compliance: yes     Side effects: None     Risk Parameters:  Patient reports no suicidal ideation  Patient reports no homicidal " ideation  Patient reports no self-injurious behavior  Patient reports no violent behavior     Exam (detailed: at least 9 elements; comprehensive: all 15 elements)   Constitutional  Vitals:  Most recent vital signs, dated less than 90 days prior to this appointment, were reviewed.   There were no vitals filed for this visit. Patient reports stable vital signs      General:  unremarkable, age appropriate, could not assess appearance      Musculoskeletal  Muscle Strength/Tone:  patient reports adequate muscle strength and tone.   Gait & Station:  non-ataxic      Psychiatric  Speech:  no latency; no press, spontaneous   Mood & Affect:  steady  congruent and appropriate   Thought Process:  normal and logical   Associations:  intact   Thought Content:  normal, no suicidality, no homicidality, delusions, or paranoia   Insight:  has awareness of illness   Judgement: behavior is adequate to circumstances   Orientation:  grossly intact   Memory: intact for content of interview   Language: grossly intact   Attention Span & Concentration:  able to focus   Fund of Knowledge:  not tested      Assessment and Diagnosis   Status/Progress: Based on the examination today, the patient's problem(s) is/are well controlled.  New problems have not been presented today.   no new obstacles are not complicating management of the primary condition.  The working differential for this patient includes bipolar disorder.      General Impression: Patient is doing quite well and is pleased with the stability of his mood. Patient is upbeat and thinking positively and is very satisfied with his current lifestyle. Patient uses his medicine wisely and is aware of the addictive nature of Valium.         ICD-10-CM ICD-9-CM   1. Bipolar I disorder  F31.9 296.7         Intervention/Counseling/Treatment Plan   Medication Management: The risks and benefits of medication were discussed with the patient. Patient agrees to continue Seroquel 300 mg q hs, Prozac  20 mg daily, Depakote 1.0 gm q am and 1.5 gm q hs, and Valium 5 mg bid prn.     Review labs - VPA level, CBC, CMP in 3/2022, discussed to complete HbA1c, lipid panel, VPA.         Return to Clinic: 3 to 4 months

## 2023-03-08 ENCOUNTER — LAB VISIT (OUTPATIENT)
Dept: LAB | Facility: HOSPITAL | Age: 58
End: 2023-03-08
Attending: NURSE PRACTITIONER
Payer: MEDICARE

## 2023-03-08 DIAGNOSIS — Z79.899 LONG TERM CURRENT USE OF ANTIPSYCHOTIC MEDICATION: ICD-10-CM

## 2023-03-08 DIAGNOSIS — F31.76 BIPOLAR 1 DISORDER, DEPRESSED, FULL REMISSION: ICD-10-CM

## 2023-03-08 LAB
BASOPHILS # BLD AUTO: 0.05 K/UL (ref 0–0.2)
BASOPHILS NFR BLD: 0.7 % (ref 0–1.9)
DIFFERENTIAL METHOD: ABNORMAL
EOSINOPHIL # BLD AUTO: 0.1 K/UL (ref 0–0.5)
EOSINOPHIL NFR BLD: 1.4 % (ref 0–8)
ERYTHROCYTE [DISTWIDTH] IN BLOOD BY AUTOMATED COUNT: 12.3 % (ref 11.5–14.5)
HCT VFR BLD AUTO: 45.4 % (ref 40–54)
HGB BLD-MCNC: 14.8 G/DL (ref 14–18)
IMM GRANULOCYTES # BLD AUTO: 0.05 K/UL (ref 0–0.04)
IMM GRANULOCYTES NFR BLD AUTO: 0.7 % (ref 0–0.5)
LYMPHOCYTES # BLD AUTO: 2.8 K/UL (ref 1–4.8)
LYMPHOCYTES NFR BLD: 38.4 % (ref 18–48)
MCH RBC QN AUTO: 31.4 PG (ref 27–31)
MCHC RBC AUTO-ENTMCNC: 32.6 G/DL (ref 32–36)
MCV RBC AUTO: 96 FL (ref 82–98)
MONOCYTES # BLD AUTO: 0.7 K/UL (ref 0.3–1)
MONOCYTES NFR BLD: 9.5 % (ref 4–15)
NEUTROPHILS # BLD AUTO: 3.6 K/UL (ref 1.8–7.7)
NEUTROPHILS NFR BLD: 49.3 % (ref 38–73)
NRBC BLD-RTO: 0 /100 WBC
PLATELET # BLD AUTO: 132 K/UL (ref 150–450)
PMV BLD AUTO: 11.1 FL (ref 9.2–12.9)
RBC # BLD AUTO: 4.72 M/UL (ref 4.6–6.2)
WBC # BLD AUTO: 7.34 K/UL (ref 3.9–12.7)

## 2023-03-08 PROCEDURE — 83036 HEMOGLOBIN GLYCOSYLATED A1C: CPT | Performed by: NURSE PRACTITIONER

## 2023-03-08 PROCEDURE — 80061 LIPID PANEL: CPT | Performed by: NURSE PRACTITIONER

## 2023-03-08 PROCEDURE — 80053 COMPREHEN METABOLIC PANEL: CPT | Performed by: NURSE PRACTITIONER

## 2023-03-08 PROCEDURE — 80164 ASSAY DIPROPYLACETIC ACD TOT: CPT | Performed by: NURSE PRACTITIONER

## 2023-03-08 PROCEDURE — 85025 COMPLETE CBC W/AUTO DIFF WBC: CPT | Performed by: NURSE PRACTITIONER

## 2023-03-08 PROCEDURE — 36415 COLL VENOUS BLD VENIPUNCTURE: CPT | Mod: PO | Performed by: NURSE PRACTITIONER

## 2023-03-09 ENCOUNTER — PATIENT MESSAGE (OUTPATIENT)
Dept: PSYCHIATRY | Facility: CLINIC | Age: 58
End: 2023-03-09
Payer: MEDICARE

## 2023-03-09 LAB
ALBUMIN SERPL BCP-MCNC: 4.1 G/DL (ref 3.5–5.2)
ALP SERPL-CCNC: 64 U/L (ref 55–135)
ALT SERPL W/O P-5'-P-CCNC: <5 U/L (ref 10–44)
ANION GAP SERPL CALC-SCNC: 10 MMOL/L (ref 8–16)
AST SERPL-CCNC: 23 U/L (ref 10–40)
BILIRUB SERPL-MCNC: 0.3 MG/DL (ref 0.1–1)
BUN SERPL-MCNC: 20 MG/DL (ref 6–20)
CALCIUM SERPL-MCNC: 9.4 MG/DL (ref 8.7–10.5)
CHLORIDE SERPL-SCNC: 105 MMOL/L (ref 95–110)
CHOLEST SERPL-MCNC: 137 MG/DL (ref 120–199)
CHOLEST/HDLC SERPL: 3.8 {RATIO} (ref 2–5)
CO2 SERPL-SCNC: 29 MMOL/L (ref 23–29)
CREAT SERPL-MCNC: 0.7 MG/DL (ref 0.5–1.4)
EST. GFR  (NO RACE VARIABLE): >60 ML/MIN/1.73 M^2
ESTIMATED AVG GLUCOSE: 157 MG/DL (ref 68–131)
GLUCOSE SERPL-MCNC: 140 MG/DL (ref 70–110)
HBA1C MFR BLD: 7.1 % (ref 4–5.6)
HDLC SERPL-MCNC: 36 MG/DL (ref 40–75)
HDLC SERPL: 26.3 % (ref 20–50)
LDLC SERPL CALC-MCNC: 68 MG/DL (ref 63–159)
NONHDLC SERPL-MCNC: 101 MG/DL
POTASSIUM SERPL-SCNC: 5 MMOL/L (ref 3.5–5.1)
PROT SERPL-MCNC: 6.6 G/DL (ref 6–8.4)
SODIUM SERPL-SCNC: 144 MMOL/L (ref 136–145)
TRIGL SERPL-MCNC: 165 MG/DL (ref 30–150)
VALPROATE SERPL-MCNC: 105.3 UG/ML (ref 50–100)

## 2023-09-21 ENCOUNTER — TELEPHONE (OUTPATIENT)
Dept: PSYCHIATRY | Facility: CLINIC | Age: 58
End: 2023-09-21
Payer: MEDICARE

## 2023-09-21 DIAGNOSIS — F31.9 BIPOLAR I DISORDER: Primary | ICD-10-CM

## 2023-09-21 DIAGNOSIS — F31.76 BIPOLAR 1 DISORDER, DEPRESSED, FULL REMISSION: ICD-10-CM

## 2023-09-21 RX ORDER — DIVALPROEX SODIUM 500 MG/1
TABLET, DELAYED RELEASE ORAL
Qty: 150 TABLET | Refills: 1 | Status: SHIPPED | OUTPATIENT
Start: 2023-09-21

## 2023-09-21 RX ORDER — FLUOXETINE HYDROCHLORIDE 20 MG/1
20 CAPSULE ORAL EVERY MORNING
Qty: 30 CAPSULE | Refills: 1 | Status: SHIPPED | OUTPATIENT
Start: 2023-09-21

## 2023-09-21 RX ORDER — QUETIAPINE FUMARATE 300 MG/1
300 TABLET, FILM COATED ORAL NIGHTLY
Qty: 30 TABLET | Refills: 1 | Status: SHIPPED | OUTPATIENT
Start: 2023-09-21

## 2023-09-21 NOTE — TELEPHONE ENCOUNTER
Former pt of NP Pregeant requesting diazepam refill. L/s in March when plan made to continue meds unchanged. Has f/u with new provider Thad 11/8/23. [place] PDMP reviewed- no irregularities noted. Will confer with Dr. Carr for review/approval one month supply of requested med.Pt also takes buprenorphine for pain.

## 2023-09-21 NOTE — TELEPHONE ENCOUNTER
Former pt of NP Pregeant requesting divalproex, fluoxetine and quetiapine refills. L/s in March when plan made to continue meds unchanged. Completed labs in March, platelets mildly low, has hx of this. Has f/u with new provider Thad 11/8/23. Will confer with Dr. Carr for review/approval bridge supply of requested meds.

## 2023-09-22 RX ORDER — DIAZEPAM 5 MG/1
5 TABLET ORAL 2 TIMES DAILY
Qty: 60 TABLET | Refills: 1 | Status: SHIPPED | OUTPATIENT
Start: 2023-09-22

## 2025-02-14 ENCOUNTER — TELEPHONE (OUTPATIENT)
Dept: GASTROENTEROLOGY | Facility: CLINIC | Age: 60
End: 2025-02-14
Payer: MEDICARE

## 2025-02-14 NOTE — TELEPHONE ENCOUNTER
Attempted to reach the patient to set up Colonoscopy, left message asking for a call back, clinic number provided.

## 2025-02-14 NOTE — TELEPHONE ENCOUNTER
----- Message from Second Light sent at 2/14/2025  1:22 PM CST -----  Type:  Needs Medical Advice    Who Called: the patient  Symptoms (please be specific):colonoscopy  How long has patient had these symptoms:    Pharmacy name and phone #:    Would the patient rather a call back or a response via [facility]? call back  Best Call Back Number: [phone]   Additional Information: Pt called to schedule colonoscopy  Thanks

## 2025-02-17 ENCOUNTER — TELEPHONE (OUTPATIENT)
Dept: GASTROENTEROLOGY | Facility: CLINIC | Age: 60
End: 2025-02-17
Payer: MEDICARE

## 2025-02-17 NOTE — TELEPHONE ENCOUNTER
----- Message from María sent at 2/17/2025 12:35 PM CST -----  Regarding: colonoscopy  Contact: pt  Type:  Needs Medical AdviceWho Called: Leslie Call Back Number: [phone] Information: pt requesting colonoscopy

## 2025-02-17 NOTE — TELEPHONE ENCOUNTER
----- Message from María sent at 2/17/2025 12:35 PM CST -----  Regarding: colonoscopy  Contact: pt  Type:  Needs Medical AdviceWho Called: Leslie Call Back Number: [phone] Information: pt requesting colonoscopy

## 2025-02-17 NOTE — TELEPHONE ENCOUNTER
Returned call to the patient, colonoscopy set up with the patient, prep information sent to patient portal, patient verbalized understanding of this.          Magnesium Citrate Prep     ALERT: Please notify the clinic if you start any blood thinners as does affect your procedure     NOTE:  -No ASPIRIN or ibuprofen products the morning of your procedure.  This includes Motrin, ALEVE, Advil, or other arthritis type medications. Tylenol is allowed.  -AVOID the following foods for 7-10 days prior to procedure: beans, peas, corn, nuts, popcorn, or tomatoes. If you forget we will not cancel your procedure.    You will need (over the counter) :  -2 ten (10) ounce bottles Magnesium Citrate (clear only)   -4 Dulcolax laxative tablets (any brand)    ONE DAY BEFORE YOUR PROCEDURE:  Take 2 Dulcolax laxatives the night prior to your prep day AT BEDTIME   Begin the clear liquid diet the entire day before your procedure  At 10 am, take 2 Dulcolax tablets  AT 2:00 PM, you will drink your first bottle of Magnesium Citrate. It will taste better if refrigerated (directions on bottle state do not refrigerate, this is okay for THIS occasion)    -It is important that you flush your system with at least Five - 8 ounce glasses of clear liquids by 8 PM.  At 6:00 PM, you will drink your second bottle of Magnesium Citrate    -Flush your system with at least THREE - 8 ounce glasses of water by midnight.    CLEAR LIQUIDS INCLUDE: Coffee (no cream), tea, apple juice, white grape juice, limit carbonated drinks to 2 in 24 hours, bullion, chicken broth, beef broth, Gatorade, Devante-Aid, jello, popsicles, snowballs, Italian ice, and hard candy. NO ALCOHOL. NOTHING RED OR PURPLE.     A preop nurse will call the day prior to your procedure to discuss medications you can and cannot take the morning of your procedure. Since sedation is used, you must have someone drive you home. It is [facility] policy that you may not take a taxi home after  sedation.    Please let us know if you have any questions after reading these instructions.     Thank you for allowing us to participate in your healthcare needs.     Respectfully,             NOTE:  ·         Dulaglutide (Trulicity) (weekly) - hold 8 days  ·         Exenatide extended release (Bydureon bcise) (weekly) - hold 8 days  ·         Semaglutide (Ozempic) (weekly) - hold 8 days  Tirepatide (Mounjaro) (weekly) - hold 8 days  Wegovy (weekly) - hold 8 days  ·         Exenatide (Byetta) (twice daily)- hold DAY OF PROCEDURE  ·         Liraglutide (Victoza, Saxenda) (daily)- hold DAY OF PROCEDURE  ·         Lixisenatide (Adlyxin) (daily)- hold DAY OF PROCEDURE  ·         Semaglutide (Rybelsus) (daily) -hold DAY OF PROCEDURE

## 2025-05-19 NOTE — H&P
History & Physical - Short Stay  Gastroenterology      SUBJECTIVE:     Procedure: Colonoscopy    Chief Complaint/Indication for Procedure: Screening    History of Present Illness:  Asymptomatic    Lety AdamsKORINA     2/14/25  4:03 PM  Note  ----- Message from panpan sent at 2/14/2025  1:22 PM CST -----  Type:  Needs Medical Advice     Who Called: the patient    Additional Information: Pt called to schedule colonoscopy  Thanks          See last Colonoscopy   Indications:        Screening for colorectal malignant neoplasm, This is                        the patient's first colonoscopy, Family history of                        colonic polyps in a first-degree relative (father)   Comorbidities       Coronary artery disease, Hypertension, Type 2 diabetes mellitus,        Hypercholesterolemia, Depression   Providers:           All Blankenship MD   Impression:  - Internal hemorrhoids.                        - Mild colonic spasm consistent with irritable bowel syndrome.                        - The examination was otherwise normal.                        - The examined portion of the ileum was normal.                        - No specimens collected.   Recommendation:      - Discharge patient to home.                        - High fiber diet.                        - Repeat colonoscopy in 8 years for screening purposes.                        - Continue present medications.                        - Return to normal activities tomorrow.   All Blankenship MD   2/21/2017           PTA Medications   Medication Sig    amLODIPine (NORVASC) 5 MG tablet Take 5 mg by mouth.    atorvastatin (LIPITOR) 40 MG tablet Take 1 tablet (40 mg total) by mouth once daily.    divalproex (DEPAKOTE) 500 MG TbEC Take 2 tablets (1000mg) every morning and 3 tablets (1500mg) every evening.    FLUoxetine 40 MG capsule Take 40 mg by mouth.    isosorbide mononitrate (IMDUR) 30 MG 24 hr tablet Take 2 tablets (60 mg total) by mouth once  daily.    lisinopriL (PRINIVIL,ZESTRIL) 20 MG tablet Take 20 mg by mouth.    metFORMIN (GLUCOPHAGE) 1000 MG tablet     QUEtiapine (SEROQUEL) 300 MG Tab Take 1 tablet (300 mg total) by mouth every evening.    QUEtiapine 150 mg Tab Take 1 tablet by mouth.    rosuvastatin (CRESTOR) 20 MG tablet     ascorbic acid (VITAMIN C) 500 MG tablet Take 500 mg by mouth once daily.    BELBUCA 450 mcg Film buccal film PLACE 1 film against inside of cheek TWICE DAILY do not fill before 02/10/25    cyclobenzaprine (FLEXERIL) 10 MG tablet Take 10 mg by mouth.    diazePAM (VALIUM) 5 MG tablet Take 1 tablet (5 mg total) by mouth 2 (two) times daily.    docosahexanoic acid/epa (FISH OIL ORAL) Take by mouth.    fluocinonide 0.05% (LIDEX) 0.05 % cream Apply twice daily to affected area until improved, then can stop and restart as needed    multivitamin (THERAGRAN) per tablet     multivitamin with minerals tablet     VITAMIN B-12 50 mcg Lozg Take 1 tablet by mouth once daily.     vitamin E 400 UNIT capsule Take 400 Units by mouth once daily.        Review of patient's allergies indicates:   Allergen Reactions    Ciprofloxacin      Unknown, happened when a child        Past Medical History:   Diagnosis Date    Arthritis     Bipolar 1 disorder     Brain aneurysm 2015    CAD (coronary artery disease)     Patient denies    Diabetes mellitus     No longer takes medicine A1C at correct level    History of psychiatric hospitalization     rehab at [facility]    Hyperlipidemia     Hypertension     Lumbar disc disease     Sleep apnea     doesn't use CPAP     Past Surgical History:   Procedure Laterality Date    analplasty      COLONOSCOPY N/A 2/21/2017    Procedure: COLONOSCOPY;  Surgeon: All Blankenship Jr., MD;  Location: [facility];  Service: Endoscopy;  Laterality: N/A;    HEMORRHOID SURGERY      INJECTION OF ANESTHETIC AGENT AROUND MEDIAL BRANCH NERVES INNERVATING LUMBAR FACET JOINT Bilateral 6/26/2018    Procedure: Block-nerve-medial  "branch-lumbar L3, L4, L5;  Surgeon: Eric Aguilera Jr., MD;  Location: [facility] OR;  Service: Pain Management;  Laterality: Bilateral;    KNEE SURGERY Right     RADIOFREQUENCY ABLATION OF LUMBAR MEDIAL BRANCH NERVE AT SINGLE LEVEL Bilateral 7/17/2018    Procedure: RADIOFREQUENCY ABLATION, NERVE, MEDIAL BRANCH, LUMBAR L3, L4, L5;  Surgeon: Eric Aguilera Jr., MD;  Location: [facility] OR;  Service: Pain Management;  Laterality: Bilateral;    TONSILLECTOMY       Family History   Problem Relation Name Age of Onset    Cancer Sister          lymphoma     Diabetes Sister      Bipolar disorder Maternal Uncle      Anxiety disorder Maternal Grandmother      Stroke Father      Heart disease Neg Hx      Melanoma Neg Hx       Social History[1]      OBJECTIVE:     Vital Signs (Most Recent)  Temp: 97.1 °F (36.2 °C) (05/20/25 0749)  Pulse: 67 (05/20/25 0749)  Resp: 17 (05/20/25 0749)  BP: 136/63 (05/20/25 0749)  SpO2: 97 % (05/20/25 0749)    Physical Exam:  : Ht: 5' 9" (175.3 cm)   Wt: 111.1 kg   BMI: 36.18 kg/m² .                                                       GENERAL:  Comfortable, in no acute distress.                                 HEENT EXAM:  Nonicteric.  No adenopathy.  Oropharynx is clear.               NECK:  Supple.                                                               LUNGS:  Clear.                                                               CARDIAC:  Regular rate and rhythm.  S1, S2.  No murmur.                      ABDOMEN:  Soft, positive bowel sounds, nontender.  No hepatosplenomegaly or masses.  No rebound or guarding.                                             EXTREMITIES:  No edema.     MENTAL STATUS:  Alert and oriented.    ASSESSMENT/PLAN:     Assessment: Colorectal cancer screening    Plan: Colonoscopy    Anesthesia Plan:   MAC / General Anaesthesia    ASA Grade: ASA 2 - Patient with mild systemic disease with no functional limitations    MALLAMPATI SCORE: II (hard and soft palate, upper portion " of tonsils anduvula visible)         [1]   Social History  Tobacco Use    Smoking status: Every Day     Current packs/day: 1.00     Types: Cigarettes, Vaping with nicotine    Smokeless tobacco: Never   Substance Use Topics    Alcohol use: Not Currently     Comment: 3-4 beers every other week    Drug use: No

## 2025-05-20 ENCOUNTER — ANESTHESIA EVENT (OUTPATIENT)
Dept: ENDOSCOPY | Facility: HOSPITAL | Age: 60
End: 2025-05-20
Payer: MEDICARE

## 2025-05-20 ENCOUNTER — HOSPITAL ENCOUNTER (OUTPATIENT)
Facility: HOSPITAL | Age: 60
Discharge: HOME | End: 2025-05-20
Attending: INTERNAL MEDICINE | Admitting: INTERNAL MEDICINE
Payer: MEDICARE

## 2025-05-20 ENCOUNTER — ANESTHESIA (OUTPATIENT)
Dept: ENDOSCOPY | Facility: HOSPITAL | Age: 60
End: 2025-05-20
Payer: MEDICARE

## 2025-05-20 DIAGNOSIS — Z12.11 SCREENING FOR COLON CANCER: ICD-10-CM

## 2025-05-20 DIAGNOSIS — Z12.11 COLON CANCER SCREENING: ICD-10-CM

## 2025-05-20 LAB — GLUCOSE SERPL-MCNC: 169 MG/DL (ref 70–110)

## 2025-05-20 PROCEDURE — 45385 COLONOSCOPY W/LESION REMOVAL: CPT | Mod: PT,,, | Performed by: INTERNAL MEDICINE

## 2025-05-20 PROCEDURE — 27201089 HC SNARE, DISP (ANY): Mod: PO | Performed by: INTERNAL MEDICINE

## 2025-05-20 PROCEDURE — 88305 TISSUE EXAM BY PATHOLOGIST: CPT | Mod: TC | Performed by: INTERNAL MEDICINE

## 2025-05-20 PROCEDURE — 37000008 HC ANESTHESIA 1ST 15 MINUTES: Mod: PO | Performed by: INTERNAL MEDICINE

## 2025-05-20 PROCEDURE — 63600175 PHARM REV CODE 636 W HCPCS: Mod: PO | Performed by: INTERNAL MEDICINE

## 2025-05-20 PROCEDURE — 37000009 HC ANESTHESIA EA ADD 15 MINS: Mod: PO | Performed by: INTERNAL MEDICINE

## 2025-05-20 PROCEDURE — 63600175 PHARM REV CODE 636 W HCPCS: Mod: PO | Performed by: NURSE ANESTHETIST, CERTIFIED REGISTERED

## 2025-05-20 PROCEDURE — 45385 COLONOSCOPY W/LESION REMOVAL: CPT | Mod: PO | Performed by: INTERNAL MEDICINE

## 2025-05-20 RX ORDER — SODIUM CHLORIDE, SODIUM LACTATE, POTASSIUM CHLORIDE, CALCIUM CHLORIDE 600; 310; 30; 20 MG/100ML; MG/100ML; MG/100ML; MG/100ML
INJECTION, SOLUTION INTRAVENOUS CONTINUOUS
Status: DISCONTINUED | OUTPATIENT
Start: 2025-05-20 | End: 2025-05-20 | Stop reason: HOSPADM

## 2025-05-20 RX ORDER — SODIUM CHLORIDE 0.9 % (FLUSH) 0.9 %
10 SYRINGE (ML) INJECTION
Status: DISCONTINUED | OUTPATIENT
Start: 2025-05-20 | End: 2025-05-20 | Stop reason: HOSPADM

## 2025-05-20 RX ORDER — PROPOFOL 10 MG/ML
VIAL (ML) INTRAVENOUS
Status: DISCONTINUED | OUTPATIENT
Start: 2025-05-20 | End: 2025-05-20

## 2025-05-20 RX ORDER — LIDOCAINE HYDROCHLORIDE 20 MG/ML
INJECTION INTRAVENOUS
Status: DISCONTINUED | OUTPATIENT
Start: 2025-05-20 | End: 2025-05-20

## 2025-05-20 RX ADMIN — SODIUM CHLORIDE, POTASSIUM CHLORIDE, SODIUM LACTATE AND CALCIUM CHLORIDE: 600; 310; 30; 20 INJECTION, SOLUTION INTRAVENOUS at 08:05

## 2025-05-20 RX ADMIN — PROPOFOL 50 MG: 10 INJECTION, EMULSION INTRAVENOUS at 08:05

## 2025-05-20 RX ADMIN — PROPOFOL 120 MG: 10 INJECTION, EMULSION INTRAVENOUS at 08:05

## 2025-05-20 RX ADMIN — LIDOCAINE HYDROCHLORIDE 100 MG: 20 INJECTION INTRAVENOUS at 08:05

## 2025-05-20 NOTE — ANESTHESIA PREPROCEDURE EVALUATION
05/20/2025  Mike Barcenas Jr. is a 59 y.o., male.      Pre-op Assessment    I have reviewed the Patient Summary Reports.     I have reviewed the Nursing Notes. I have reviewed the NPO Status.   I have reviewed the Medications.     Review of Systems  Anesthesia Hx:  No problems with previous Anesthesia                Social:  Smoker       Cardiovascular:     Hypertension, well controlled   CAD  asymptomatic         hyperlipidemia                               Pulmonary:        Sleep Apnea                Renal/:  Renal/ Normal                 Musculoskeletal:  Arthritis          Spine Disorders: lumbar            Neurological:    Neuromuscular Disease,       Brain aneurysm        Peripheral Neuropathy                          Endocrine:  Diabetes, well controlled, type 2         Obesity / BMI > 30, Morbid Obesity / BMI > 40  Psych:  Psychiatric History (bipolar 1)                Physical Exam  General: Well nourished, Cooperative, Alert and Oriented    Airway:  Mallampati: II   Mouth Opening: Normal  TM Distance: Normal  Neck ROM: Normal ROM    Anesthesia Plan  Type of Anesthesia, risks & benefits discussed:    Anesthesia Type: Gen ETT, Gen Supraglottic Airway, Gen Natural Airway, MAC  Intra-op Monitoring Plan: Standard ASA Monitors  Post Op Pain Control Plan: multimodal analgesia  Induction:  IV  Airway Plan: Direct, Video and Fiberoptic, Post-Induction  Informed Consent: Informed consent signed with the Patient and all parties understand the risks and agree with anesthesia plan.  All questions answered.   ASA Score: 3    Ready For Surgery From Anesthesia Perspective.   .

## 2025-05-20 NOTE — PROVATION PATIENT INSTRUCTIONS
Discharge Summary/Instructions for after Colonoscopy with   Biopsy/Polypectomy  Mike Barcenas    Tuesday, May 20, 2025  All Blankenship MD  RESTRICTIONS ON ACTIVITY:  - Do not drive a car or operate machinery until the day after the procedure.      - The following day: return to full activity including work.  - For  3 days: No heavy lifting, straining or running.  - Diet: You can have solid foods, but no gassy foods (i.e. beans, broccoli,   cabbage, etc).  TREATMENT FOR COMMON SIDE EFFECTS:  - Mild abdominal pain and bloating or excessive gas: rest, eat lightly and   use a heating pad.  SYMPTOMS TO WATCH FOR AND REPORT TO YOUR PHYSICIAN:  1. Severe abdominal pain.  2. Fever within 24 hours after a procedure.  3. A large amount of rectal bleeding. (A small amount of blood from the   rectum is not serious, especially if hemorrhoids are present.  3.  Because air was put into your colon during the procedure, expelling   large amounts of air from your rectum is normal.  4.  You may not have a bowel movement for 1-3 days because of the   colonoscopy prep.  This is normal.  5.  Call immediately if you notice any of the following:   Chills and/or fever over 101   Persistent vomiting   Severe abdominal pain, other than gas cramps   Severe chest pain   Black, tarry stools   Any bleeding - exceeding one tablespoon  Your doctor recommends these additional instructions:  You are being discharged to home.   We are waiting for your pathology results.   Your physician has recommended a repeat colonoscopy in 5 years for   surveillance.   Eat a high fiber diet, eat a diabetic (ADA) diet and eat a low fat diet.   Take a fiber supplement, for example Citrucel, Fibercon, Konsyl or   Metamucil.  None  If you have any questions or problems, please call your physician.  EMERGENCY PHONE NUMBER: [phone]  LAB RESULTS: Call in two (2) weeks for lab results, [phone]  ___________________________________________  Nurse  Signature  ___________________________________________  Patient/Designated Responsible Party Signature  All Blankenship MD  5/20/2025 9:15:57 AM  This report has been verified and signed electronically.  Dear patient,  As a result of recent federal legislation (The Federal Cures Act), you may   receive lab or pathology results from your procedure in your [facility]   account before your physician is able to contact you. Your physician or   their representative will relay the results to you with their   recommendations at their soonest availability.  Thank you.  PROVATION

## 2025-05-20 NOTE — ANESTHESIA POSTPROCEDURE EVALUATION
Anesthesia Post Evaluation    Patient: Mike Barcenas Jr.    Procedure(s) Performed: Procedure(s) (LRB):  COLONOSCOPY, SCREENING, LOW RISK PATIENT (N/A)    Final Anesthesia Type: general      Patient location during evaluation: PACU  Patient participation: Yes- Able to Participate  Level of consciousness: awake and alert and oriented  Post-procedure vital signs: reviewed and stable  Pain management: adequate  Airway patency: patent    PONV status at discharge: No PONV  Anesthetic complications: no      Cardiovascular status: blood pressure returned to baseline and stable  Respiratory status: unassisted and spontaneous ventilation  Hydration status: euvolemic  Follow-up not needed.              Vitals Value Taken Time   /68 05/20/25 09:30   Temp 36.4 °C (97.5 °F) 05/20/25 09:02   Pulse 65 05/20/25 09:30   Resp 16 05/20/25 09:30   SpO2 99 % 05/20/25 09:30         Event Time   Out of Recovery 09:36:43         Pain/Jose L Score: Jose L Score: 10 (5/20/2025  9:32 AM)

## 2025-05-20 NOTE — BRIEF OP NOTE
Discharge Note  Short Stay      SUMMARY     Admit Date: 5/20/2025    Attending Physician: All Blankenship Jr., MD     Discharge Physician: All Blankenship Jr., MD    Discharge Date: 5/20/2025 9:17 AM    Final Diagnosis: Screening for colon cancer [Z12.11]      Impression:            - One <2 mm polyp in the cecum, removed with a                          cold snare. Resected and retrieved.                          - Non-bleeding internal hemorrhoids.                          - The examination was otherwise normal.                          - The examined portion of the ileum was normal.   Recommendation:        - Discharge patient to home.                          - Await pathology results.                          - Repeat colonoscopy in 5 years for surveillance.                          - High fiber diet, diabetic (ADA) diet and low fat                          diet.                          - Use fiber, for example Citrucel, Fibercon,                          Konsyl or Metamucil.                          - Exercise and weight loss.                          - Continue present medications.                          - Patient has a contact number available for                          emergencies. The signs and symptoms of potential                          delayed complications were discussed with the                          patient. Return to normal activities tomorrow.                          Written discharge instructions were provided to                          the patient.                          - Return to normal activities tomorrow.   All Blankenship MD   5/20/2025       Disposition: HOME OR SELF CARE    Patient Instructions:   Current Discharge Medication List        CONTINUE these medications which have NOT CHANGED    Details   amLODIPine (NORVASC) 5 MG tablet Take 5 mg by mouth.      atorvastatin (LIPITOR) 40 MG tablet Take 1 tablet (40 mg total) by mouth once daily.  Qty: 90 tablet, Refills:  3      divalproex (DEPAKOTE) 500 MG TbEC Take 2 tablets (1000mg) every morning and 3 tablets (1500mg) every evening.  Qty: 150 tablet, Refills: 1    Associated Diagnoses: Bipolar 1 disorder, depressed, full remission      FLUoxetine 40 MG capsule Take 40 mg by mouth.      isosorbide mononitrate (IMDUR) 30 MG 24 hr tablet Take 2 tablets (60 mg total) by mouth once daily.  Qty: 180 tablet, Refills: 1      lisinopriL (PRINIVIL,ZESTRIL) 20 MG tablet Take 20 mg by mouth.      metFORMIN (GLUCOPHAGE) 1000 MG tablet       !! QUEtiapine (SEROQUEL) 300 MG Tab Take 1 tablet (300 mg total) by mouth every evening.  Qty: 30 tablet, Refills: 1    Associated Diagnoses: Bipolar 1 disorder, depressed, full remission      !! QUEtiapine 150 mg Tab Take 1 tablet by mouth.      rosuvastatin (CRESTOR) 20 MG tablet       ascorbic acid (VITAMIN C) 500 MG tablet Take 500 mg by mouth once daily.      BELBUCA 450 mcg Film buccal film PLACE 1 film against inside of cheek TWICE DAILY do not fill before 02/10/25      cyclobenzaprine (FLEXERIL) 10 MG tablet Take 10 mg by mouth.      diazePAM (VALIUM) 5 MG tablet Take 1 tablet (5 mg total) by mouth 2 (two) times daily.  Qty: 60 tablet, Refills: 1    Associated Diagnoses: Bipolar I disorder      docosahexanoic acid/epa (FISH OIL ORAL) Take by mouth.      fluocinonide 0.05% (LIDEX) 0.05 % cream Apply twice daily to affected area until improved, then can stop and restart as needed  Qty: 60 g, Refills: 1    Associated Diagnoses: Lichen simplex chronicus      multivitamin (THERAGRAN) per tablet       multivitamin with minerals tablet       VITAMIN B-12 50 mcg Lozg Take 1 tablet by mouth once daily.       vitamin E 400 UNIT capsule Take 400 Units by mouth once daily.        !! - Potential duplicate medications found. Please discuss with provider.          Discharge Procedure Orders (must include Diet, Follow-up, Activity)    Follow Up:  Follow up with PCP as per your routine.  Please follow a high fiber  diet.  Activity as tolerated.    No driving day of procedure.

## 2025-05-20 NOTE — TRANSFER OF CARE
"Anesthesia Transfer of Care Note    Patient: Miek Barceans Jr.    Procedure(s) Performed: Procedure(s) (LRB):  COLONOSCOPY, SCREENING, LOW RISK PATIENT (N/A)    Patient location: PACU    Anesthesia Type: general    Transport from OR: Transported from OR on 2-3 L/min O2 by NC with adequate spontaneous ventilation    Post pain: adequate analgesia    Post assessment: no apparent anesthetic complications and tolerated procedure well    Post vital signs: stable    Level of consciousness: awake, alert and oriented    Nausea/Vomiting: no nausea/vomiting    Complications: none    Transfer of care protocol was followed      Last vitals: Visit Vitals  /63 (BP Location: Left arm, Patient Position: Sitting)   Pulse 67   Temp 36.2 °C (97.1 °F) (Skin)   Resp 17   Ht 5' 9" (1.753 m)   Wt 111.1 kg (245 lb)   SpO2 97%   BMI 36.18 kg/m²     "

## 2025-05-20 NOTE — DISCHARGE INSTRUCTIONS
Recovery After Procedural Sedation (Adult)   You have been given medicine by vein to make you sleep during your surgery. This may have included both a pain medicine and sleeping medicine. Most of the effects have worn off. But you may still have some drowsiness for the next 6 to 8 hours.  Home care  Follow these guidelines when you get home:  For the next 8 hours, you should be watched by a responsible adult. This person should make sure your condition is not getting worse.  Don't drink any alcohol for the next 24 hours.  Don't drive, operate dangerous machinery, or make important business or personal decisions during the next 24 hours.  To prevent injury or falls, use caution when standing and walking for at least 24 hours after your procedure.  Note: Your healthcare provider may tell you not to take any medicine by mouth for pain or sleep in the next 4 hours. These medicines may react with the medicines you were given in the hospital. This could cause a much stronger response than usual.  Follow-up care  Follow up with your healthcare provider if you are not alert and back to your usual level of activity within 12 hours.  When to seek medical advice  Call your healthcare provider right away if any of these occur:  Drowsiness gets worse  Weakness or dizziness gets worse  Repeated vomiting  You can't be awakened  Fever  New rash  [employer] last reviewed this educational content on 9/1/2019  © 5391-6251 [employer]. [address] [place] [address], [place] [id number]. All rights reserved. This information is not intended as a substitute for professional medical care. Always follow your healthcare professional's instructions         High-Fiber Diet  Fiber is in fruits, vegetables, cereals, and grains. Fiber passes through your body undigested. A high-fiber diet helps food move through your intestinal tract. The added bulk is helpful in preventing constipation. In people with diverticulosis, fiber helps clean out  the pouches along the colon wall. It also prevents new pouches from forming. A high-fiber diet reduces the risk of colon cancer. It also lowers blood cholesterol and prevents high blood sugar in people with diabetes.    The fiber-rich foods listed below should be part of your diet. If you are not used to high-fiber foods, start with 1 or 2 foods from this list. Every 3 to 4 days add a new one to your diet. Do this until you are eating 4 high-fiber foods per day. This should give you 20 to 35 grams of fiber a day. It is also important to drink a lot of water when you are on this diet. You should have 6 to 8 glasses of water a day. Water makes the fiber swell and increases the benefit.  Foods high in dietary fiber  The following foods are high in dietary fiber:  Breads. Breads made with 100% whole-wheat flour; carolyn, wheat, or rye crackers; whole-grain tortillas, bran muffins.  Cereals. Whole-grain and bran cereals with bran (shredded wheat, wheat flakes, raisin bran, corn bran); oatmeal, rolled oats, granola, and brown rice.  Fruits. Fresh fruits and their edible skins (pears, prunes, raisins, berries, apples, and apricots); bananas, citrus fruit, mangoes, pineapple; and prune juice.  Nuts. Any nuts and seeds.  Vegetables. Best served raw or lightly cooked. All types, especially: green peas, celery, eggplant, potatoes, spinach, broccoli, Harper sprouts, winter squash, carrots, cauliflower, soybeans, lentils, and fresh and dried beans of all kinds.  Other. Popcorn, any spices.  Date Last Reviewed: 8/1/2016  © 3859-4978 [employer]. [address] [place] [address], [place] [address]. All rights reserved. This information is not intended as a substitute for professional medical care. Always follow your healthcare professional's instructions.

## 2025-05-21 VITALS
HEART RATE: 65 BPM | OXYGEN SATURATION: 99 % | RESPIRATION RATE: 16 BRPM | BODY MASS INDEX: 36.29 KG/M2 | TEMPERATURE: 98 F | WEIGHT: 245 LBS | HEIGHT: 69 IN | SYSTOLIC BLOOD PRESSURE: 130 MMHG | DIASTOLIC BLOOD PRESSURE: 68 MMHG

## 2025-05-22 LAB
ESTROGEN SERPL-MCNC: NORMAL PG/ML
INSULIN SERPL-ACNC: NORMAL U[IU]/ML
LAB AP CLINICAL INFORMATION: NORMAL
LAB AP GROSS DESCRIPTION: NORMAL
LAB AP PERFORMING LOCATION(S): NORMAL
LAB AP REPORT FOOTNOTES: NORMAL

## 2025-05-29 ENCOUNTER — RESULTS FOLLOW-UP (OUTPATIENT)
Dept: GASTROENTEROLOGY | Facility: HOSPITAL | Age: 60
End: 2025-05-29

## 2025-05-30 NOTE — PROGRESS NOTES
Please let patient know.  The polyp was fine, benign adenoma.        Rec remains the same.  Repeat colonoscopy in 5 yrs.

## 2025-08-07 ENCOUNTER — TELEPHONE (OUTPATIENT)
Dept: GASTROENTEROLOGY | Facility: CLINIC | Age: 60
End: 2025-08-07
Payer: MEDICARE

## (undated) DEVICE — SPONGE DERMA 8PLY 2X2

## (undated) DEVICE — NDL 18GA X1 1/2 REG BEVEL

## (undated) DEVICE — GLOVE SURGICAL LATEX SZ 7

## (undated) DEVICE — MARKER SKIN STND TIP BLUE BARR

## (undated) DEVICE — CANNULA VENOM RF 18GX100MM

## (undated) DEVICE — NDL SPINAL 25GX3.5 SPINOCAN

## (undated) DEVICE — SEE MEDLINE ITEM 152622

## (undated) DEVICE — TRAY NERVE BLOCK

## (undated) DEVICE — APPLICATOR CHLORAPREP CLR 10.5